# Patient Record
Sex: FEMALE | Race: BLACK OR AFRICAN AMERICAN | NOT HISPANIC OR LATINO | Employment: UNEMPLOYED | URBAN - METROPOLITAN AREA
[De-identification: names, ages, dates, MRNs, and addresses within clinical notes are randomized per-mention and may not be internally consistent; named-entity substitution may affect disease eponyms.]

---

## 2017-05-20 ENCOUNTER — HOSPITAL ENCOUNTER (EMERGENCY)
Facility: HOSPITAL | Age: 32
Discharge: HOME | End: 2017-05-20
Attending: EMERGENCY MEDICINE | Admitting: EMERGENCY MEDICINE

## 2017-05-20 VITALS
WEIGHT: 170 LBS | RESPIRATION RATE: 16 BRPM | OXYGEN SATURATION: 99 % | SYSTOLIC BLOOD PRESSURE: 140 MMHG | TEMPERATURE: 98.2 F | DIASTOLIC BLOOD PRESSURE: 73 MMHG | HEART RATE: 86 BPM

## 2017-05-20 DIAGNOSIS — N39.0 UTI (URINARY TRACT INFECTION): Primary | ICD-10-CM

## 2017-05-20 LAB
BACTERIA UR QL AUTO: ABNORMAL /HPF
BILIRUB UR QL STRIP: NEGATIVE
CLARITY UR: CLEAR
COLOR UR: YELLOW
FINE GRAN CASTS URNS QL MICRO: ABNORMAL /LPF
GLUCOSE UR STRIP-MCNC: ABNORMAL MG/DL
HCG UR QL: NEGATIVE
HGB UR QL STRIP.AUTO: ABNORMAL
KETONES UR STRIP-MCNC: NEGATIVE MG/DL
LEUKOCYTE ESTERASE UR QL STRIP: ABNORMAL
NITRITE UR QL STRIP: NEGATIVE
NON-SQ EPI CELLS URNS QL MICRO: ABNORMAL /HPF
PH UR STRIP.AUTO: 5.5 [PH] (ref 4.5–8)
PROT UR STRIP-MCNC: ABNORMAL MG/DL
RBC #/AREA URNS AUTO: ABNORMAL /HPF
SP GR UR STRIP.AUTO: >=1.03 (ref 1–1.03)
UROBILINOGEN UR QL STRIP.AUTO: 0.2 E.U./DL
WBC #/AREA URNS AUTO: ABNORMAL /HPF

## 2017-05-20 PROCEDURE — 81025 URINE PREGNANCY TEST: CPT | Performed by: PHYSICIAN ASSISTANT

## 2017-05-20 PROCEDURE — 99283 EMERGENCY DEPT VISIT LOW MDM: CPT

## 2017-05-20 PROCEDURE — 87086 URINE CULTURE/COLONY COUNT: CPT

## 2017-05-20 PROCEDURE — 81001 URINALYSIS AUTO W/SCOPE: CPT

## 2017-05-20 PROCEDURE — 87186 SC STD MICRODIL/AGAR DIL: CPT

## 2017-05-20 PROCEDURE — 81002 URINALYSIS NONAUTO W/O SCOPE: CPT | Performed by: PHYSICIAN ASSISTANT

## 2017-05-20 PROCEDURE — 87147 CULTURE TYPE IMMUNOLOGIC: CPT

## 2017-05-20 RX ORDER — NITROFURANTOIN 25; 75 MG/1; MG/1
100 CAPSULE ORAL 2 TIMES DAILY
Qty: 10 CAPSULE | Refills: 0 | Status: SHIPPED | OUTPATIENT
Start: 2017-05-20 | End: 2017-05-25

## 2017-05-23 LAB — BACTERIA UR CULT: NORMAL

## 2017-09-23 ENCOUNTER — HOSPITAL ENCOUNTER (EMERGENCY)
Facility: HOSPITAL | Age: 32
Discharge: HOME | End: 2017-09-23
Attending: EMERGENCY MEDICINE | Admitting: EMERGENCY MEDICINE

## 2017-09-23 VITALS
OXYGEN SATURATION: 100 % | DIASTOLIC BLOOD PRESSURE: 82 MMHG | TEMPERATURE: 98.1 F | RESPIRATION RATE: 15 BRPM | SYSTOLIC BLOOD PRESSURE: 166 MMHG | HEART RATE: 98 BPM

## 2017-09-23 DIAGNOSIS — E11.9 TYPE 2 DIABETES MELLITUS (HCC): ICD-10-CM

## 2017-09-23 DIAGNOSIS — N39.0 UTI (URINARY TRACT INFECTION): Primary | ICD-10-CM

## 2017-09-23 LAB
BACTERIA UR QL AUTO: ABNORMAL /HPF
BILIRUB UR QL STRIP: NEGATIVE
CLARITY UR: ABNORMAL
COLOR UR: YELLOW
EXT PREG TEST URINE: NEGATIVE
GLUCOSE SERPL-MCNC: 288 MG/DL (ref 65–140)
GLUCOSE UR STRIP-MCNC: ABNORMAL MG/DL
HGB UR QL STRIP.AUTO: ABNORMAL
KETONES UR STRIP-MCNC: NEGATIVE MG/DL
LEUKOCYTE ESTERASE UR QL STRIP: ABNORMAL
NITRITE UR QL STRIP: NEGATIVE
NON-SQ EPI CELLS URNS QL MICRO: ABNORMAL /HPF
OTHER STN SPEC: ABNORMAL
PH UR STRIP.AUTO: 5.5 [PH] (ref 4.5–8)
PROT UR STRIP-MCNC: ABNORMAL MG/DL
RBC #/AREA URNS AUTO: ABNORMAL /HPF
SP GR UR STRIP.AUTO: 1.02 (ref 1–1.03)
UROBILINOGEN UR QL STRIP.AUTO: 0.2 E.U./DL
WBC #/AREA URNS AUTO: ABNORMAL /HPF

## 2017-09-23 PROCEDURE — 81025 URINE PREGNANCY TEST: CPT | Performed by: EMERGENCY MEDICINE

## 2017-09-23 PROCEDURE — 87186 SC STD MICRODIL/AGAR DIL: CPT

## 2017-09-23 PROCEDURE — 99283 EMERGENCY DEPT VISIT LOW MDM: CPT

## 2017-09-23 PROCEDURE — 82948 REAGENT STRIP/BLOOD GLUCOSE: CPT

## 2017-09-23 PROCEDURE — 87086 URINE CULTURE/COLONY COUNT: CPT

## 2017-09-23 PROCEDURE — 87077 CULTURE AEROBIC IDENTIFY: CPT

## 2017-09-23 PROCEDURE — 81001 URINALYSIS AUTO W/SCOPE: CPT

## 2017-09-23 RX ORDER — NITROFURANTOIN 25; 75 MG/1; MG/1
100 CAPSULE ORAL 2 TIMES DAILY
Qty: 10 CAPSULE | Refills: 0 | Status: SHIPPED | OUTPATIENT
Start: 2017-09-23 | End: 2018-07-31

## 2017-09-25 LAB — BACTERIA UR CULT: NORMAL

## 2018-07-31 ENCOUNTER — HOSPITAL ENCOUNTER (EMERGENCY)
Facility: HOSPITAL | Age: 33
Discharge: HOME | End: 2018-08-01
Attending: EMERGENCY MEDICINE | Admitting: EMERGENCY MEDICINE

## 2018-07-31 DIAGNOSIS — K29.70 GASTRITIS: Primary | ICD-10-CM

## 2018-07-31 LAB
ALBUMIN SERPL BCP-MCNC: 3.4 G/DL (ref 3.5–5)
ALP SERPL-CCNC: 64 U/L (ref 46–116)
ALT SERPL W P-5'-P-CCNC: 24 U/L (ref 12–78)
ANION GAP SERPL CALCULATED.3IONS-SCNC: 6 MMOL/L (ref 4–13)
AST SERPL W P-5'-P-CCNC: 17 U/L (ref 5–45)
BASOPHILS # BLD AUTO: 0.06 THOUSANDS/ΜL (ref 0–0.1)
BASOPHILS NFR BLD AUTO: 1 % (ref 0–1)
BILIRUB SERPL-MCNC: 0.18 MG/DL (ref 0.2–1)
BUN SERPL-MCNC: 12 MG/DL (ref 5–25)
CALCIUM SERPL-MCNC: 9.1 MG/DL (ref 8.3–10.1)
CHLORIDE SERPL-SCNC: 99 MMOL/L (ref 100–108)
CO2 SERPL-SCNC: 28 MMOL/L (ref 21–32)
CREAT SERPL-MCNC: 0.89 MG/DL (ref 0.6–1.3)
EOSINOPHIL # BLD AUTO: 0.1 THOUSAND/ΜL (ref 0–0.61)
EOSINOPHIL NFR BLD AUTO: 2 % (ref 0–6)
ERYTHROCYTE [DISTWIDTH] IN BLOOD BY AUTOMATED COUNT: 13.1 % (ref 11.6–15.1)
GFR SERPL CREATININE-BSD FRML MDRD: 98 ML/MIN/1.73SQ M
GLUCOSE SERPL-MCNC: 218 MG/DL (ref 65–140)
HCT VFR BLD AUTO: 37.5 % (ref 34.8–46.1)
HGB BLD-MCNC: 12.4 G/DL (ref 11.5–15.4)
LIPASE SERPL-CCNC: 118 U/L (ref 73–393)
LYMPHOCYTES # BLD AUTO: 2.41 THOUSANDS/ΜL (ref 0.6–4.47)
LYMPHOCYTES NFR BLD AUTO: 46 % (ref 14–44)
MCH RBC QN AUTO: 28.8 PG (ref 26.8–34.3)
MCHC RBC AUTO-ENTMCNC: 33.1 G/DL (ref 31.4–37.4)
MCV RBC AUTO: 87 FL (ref 82–98)
MONOCYTES # BLD AUTO: 0.41 THOUSAND/ΜL (ref 0.17–1.22)
MONOCYTES NFR BLD AUTO: 8 % (ref 4–12)
NEUTROPHILS # BLD AUTO: 2.25 THOUSANDS/ΜL (ref 1.85–7.62)
NEUTS SEG NFR BLD AUTO: 43 % (ref 43–75)
NRBC BLD AUTO-RTO: 0 /100 WBCS
PLATELET # BLD AUTO: 464 THOUSANDS/UL (ref 149–390)
PMV BLD AUTO: 9.2 FL (ref 8.9–12.7)
POTASSIUM SERPL-SCNC: 3.8 MMOL/L (ref 3.5–5.3)
PROT SERPL-MCNC: 7.8 G/DL (ref 6.4–8.2)
RBC # BLD AUTO: 4.31 MILLION/UL (ref 3.81–5.12)
SODIUM SERPL-SCNC: 133 MMOL/L (ref 136–145)
WBC # BLD AUTO: 5.23 THOUSAND/UL (ref 4.31–10.16)

## 2018-07-31 PROCEDURE — 36415 COLL VENOUS BLD VENIPUNCTURE: CPT | Performed by: EMERGENCY MEDICINE

## 2018-07-31 PROCEDURE — 83690 ASSAY OF LIPASE: CPT | Performed by: EMERGENCY MEDICINE

## 2018-07-31 PROCEDURE — 96374 THER/PROPH/DIAG INJ IV PUSH: CPT

## 2018-07-31 PROCEDURE — 85025 COMPLETE CBC W/AUTO DIFF WBC: CPT | Performed by: EMERGENCY MEDICINE

## 2018-07-31 PROCEDURE — 80053 COMPREHEN METABOLIC PANEL: CPT | Performed by: EMERGENCY MEDICINE

## 2018-07-31 PROCEDURE — 96361 HYDRATE IV INFUSION ADD-ON: CPT

## 2018-07-31 RX ORDER — MAGNESIUM HYDROXIDE/ALUMINUM HYDROXICE/SIMETHICONE 120; 1200; 1200 MG/30ML; MG/30ML; MG/30ML
30 SUSPENSION ORAL ONCE
Status: COMPLETED | OUTPATIENT
Start: 2018-07-31 | End: 2018-07-31

## 2018-07-31 RX ORDER — ONDANSETRON 2 MG/ML
4 INJECTION INTRAMUSCULAR; INTRAVENOUS ONCE
Status: COMPLETED | OUTPATIENT
Start: 2018-07-31 | End: 2018-07-31

## 2018-07-31 RX ADMIN — ALUMINUM HYDROXIDE, MAGNESIUM HYDROXIDE, AND SIMETHICONE 30 ML: 200; 200; 20 SUSPENSION ORAL at 23:10

## 2018-07-31 RX ADMIN — LIDOCAINE HYDROCHLORIDE 10 ML: 20 SOLUTION ORAL; TOPICAL at 23:10

## 2018-07-31 RX ADMIN — ONDANSETRON 4 MG: 2 INJECTION INTRAMUSCULAR; INTRAVENOUS at 23:10

## 2018-07-31 RX ADMIN — SODIUM CHLORIDE 1000 ML: 0.9 INJECTION, SOLUTION INTRAVENOUS at 23:10

## 2018-08-01 VITALS
BODY MASS INDEX: 33.43 KG/M2 | WEIGHT: 182.76 LBS | RESPIRATION RATE: 20 BRPM | OXYGEN SATURATION: 99 % | DIASTOLIC BLOOD PRESSURE: 82 MMHG | TEMPERATURE: 98.3 F | HEART RATE: 90 BPM | SYSTOLIC BLOOD PRESSURE: 150 MMHG

## 2018-08-01 PROCEDURE — 99284 EMERGENCY DEPT VISIT MOD MDM: CPT

## 2018-08-01 RX ORDER — SUCRALFATE 1 G/1
1 TABLET ORAL
Qty: 56 TABLET | Refills: 0 | Status: SHIPPED | OUTPATIENT
Start: 2018-08-01 | End: 2018-09-14

## 2018-08-01 RX ORDER — ONDANSETRON 4 MG/1
4 TABLET, ORALLY DISINTEGRATING ORAL EVERY 8 HOURS PRN
Qty: 10 TABLET | Refills: 0 | Status: SHIPPED | OUTPATIENT
Start: 2018-08-01 | End: 2018-09-14

## 2018-08-01 NOTE — DISCHARGE INSTRUCTIONS
Gastritis   WHAT YOU NEED TO KNOW:   Gastritis is inflammation or irritation of the lining of your stomach  DISCHARGE INSTRUCTIONS:   Call 911 for any of the following:   · You develop chest pain or shortness of breath  Return to the emergency department if:   · You vomit blood  · You have black or bloody bowel movements  · You have severe stomach or back pain  Contact your healthcare provider if:   · You have a fever  · You have new or worsening symptoms, even after treatment  · You have questions or concerns about your condition or care  Medicines:   · Medicines  may be given to help treat a bacterial infection or decrease stomach acid  · Take your medicine as directed  Contact your healthcare provider if you think your medicine is not helping or if you have side effects  Tell him or her if you are allergic to any medicine  Keep a list of the medicines, vitamins, and herbs you take  Include the amounts, and when and why you take them  Bring the list or the pill bottles to follow-up visits  Carry your medicine list with you in case of an emergency  Manage or prevent gastritis:   · Do not smoke  Nicotine and other chemicals in cigarettes and cigars can make your symptoms worse and cause lung damage  Ask your healthcare provider for information if you currently smoke and need help to quit  E-cigarettes or smokeless tobacco still contain nicotine  Talk to your healthcare provider before you use these products  · Do not drink alcohol  Alcohol can prevent healing and make your gastritis worse  Talk to your healthcare provider if you need help to stop drinking  · Do not take NSAIDs or aspirin unless directed  These and similar medicines can cause irritation  If your healthcare provider says it is okay to take NSAIDs, take them with food  · Do not eat foods that cause irritation  Foods such as oranges and salsa can cause burning or pain  Eat a variety of healthy foods   Examples include fruits (not citrus), vegetables, low-fat dairy products, beans, whole-grain breads, and lean meats and fish  Try to eat small meals, and drink water with your meals  Do not eat for at least 3 hours before you go to bed  · Find ways to relax and decrease stress  Stress can increase stomach acid and make gastritis worse  Activities such as yoga, meditation, or listening to music can help you relax  Spend time with friends, or do things you enjoy  Follow up with your healthcare provider as directed: You may need ongoing tests or treatment, or referral to a gastroenterologist  Write down your questions so you remember to ask them during your visits  © 2017 [facility] Information is for End User's use only and may not be sold, redistributed or otherwise used for commercial purposes  All illustrations and images included in CareNotes® are the copyrighted property of [employer]  or Suhail Archibald  The above information is an  only  It is not intended as medical advice for individual conditions or treatments  Talk to your doctor, nurse or pharmacist before following any medical regimen to see if it is safe and effective for you

## 2018-08-01 NOTE — ED PROVIDER NOTES
History  Chief Complaint   Patient presents with    Abdominal Pain     Patient complaint of 1 5 weeks of abominal pain upper left radiating downward, nausea, and vomiting  Hx ulcers  41-year-old female with a history of diabetes, gastric ulcer presents to the emergency department with a 2 day history sharp and burning epigastric pain  She states she has been taking her usual meds without relief  She had 1 episode of vomiting where she did notice blood  Every time she tries to eat or drink anything the pain becomes worse  No fevers or chills  No prior abdominal surgeries  History provided by:  Patient   used: No    Abdominal Pain   Pain location:  Epigastric  Pain quality: burning and sharp    Pain radiates to:  Does not radiate  Pain severity:  Unable to specify  Onset quality:  Gradual  Duration:  2 days  Timing:  Constant  Progression:  Waxing and waning  Chronicity:  New  Context: eating    Context: not alcohol use, not awakening from sleep, not diet changes, not previous surgeries, not recent illness, not sick contacts, not suspicious food intake and not trauma    Relieved by:  Nothing  Worsened by:  Eating  Ineffective treatments:  Antacids  Associated symptoms: nausea and vomiting    Associated symptoms: no anorexia, no belching, no chest pain, no chills, no constipation, no cough, no diarrhea, no dysuria, no fatigue, no fever, no flatus, no hematemesis, no hematochezia, no hematuria, no shortness of breath and no sore throat    Vomiting:     Quality:  Stomach contents and bright red blood    Number of occurrences:  1    Timing:  Intermittent  Risk factors: obesity    Risk factors: no alcohol abuse, no aspirin use, has not had multiple surgeries, no NSAID use and not pregnant        Prior to Admission Medications   Prescriptions Last Dose Informant Patient Reported? Taking?   omeprazole (PriLOSEC) 20 mg delayed release capsule   Yes No   Sig: Take 20 mg by mouth daily  sitaGLIPtin (JANUVIA) 100 mg tablet   Yes No   Sig: Take 100 mg by mouth 2 (two) times a day  Facility-Administered Medications: None       Past Medical History:   Diagnosis Date    [facility]     Seasonal allergies     Ulcer        Past Surgical History:   Procedure Laterality Date    NO PAST SURGERIES         History reviewed  No pertinent family history  I have reviewed and agree with the history as documented  Social History   Substance Use Topics    Smoking status: Never Smoker    Smokeless tobacco: Never Used    Alcohol use No        Review of Systems   Constitutional: Negative  Negative for chills, diaphoresis, fatigue and fever  HENT: Negative  Negative for congestion, rhinorrhea and sore throat  Eyes: Negative  Negative for discharge, redness and itching  Respiratory: Negative  Negative for apnea, cough, chest tightness, shortness of breath and wheezing  Cardiovascular: Negative for chest pain, palpitations and leg swelling  Gastrointestinal: Positive for abdominal pain, nausea and vomiting  Negative for anorexia, blood in stool, constipation, diarrhea, flatus, hematemesis and hematochezia  Endocrine: Negative  Genitourinary: Negative  Negative for dysuria, flank pain, frequency, hematuria and urgency  Musculoskeletal: Negative  Negative for back pain  Skin: Negative  Allergic/Immunologic: Negative  Neurological: Negative  Negative for dizziness, syncope, weakness, light-headedness, numbness and headaches  Hematological: Negative  All other systems reviewed and are negative  Physical Exam  Physical Exam   Constitutional: She is oriented to person, place, and time  She appears well-developed and well-nourished  Non-toxic appearance  She does not have a sickly appearance  She does not appear ill  No distress  HENT:   Head: Normocephalic and atraumatic     Right Ear: External ear normal    Left Ear: External ear normal  Mouth/Throat: Oropharynx is clear and moist    Eyes: Conjunctivae are normal  Pupils are equal, round, and reactive to light  Right eye exhibits no discharge  Left eye exhibits no discharge  No scleral icterus  Cardiovascular: Normal rate, regular rhythm and normal heart sounds  Exam reveals no gallop and no friction rub  No murmur heard  Pulmonary/Chest: Effort normal and breath sounds normal  No respiratory distress  She has no wheezes  She has no rales  She exhibits no tenderness  Abdominal: Soft  Normal appearance and bowel sounds are normal  She exhibits no distension and no mass  There is tenderness in the epigastric area  There is no rebound and no guarding  No hernia  Neurological: She is alert and oriented to person, place, and time  She has normal reflexes  She exhibits normal muscle tone  Skin: Skin is warm and dry  No rash noted  She is not diaphoretic  No erythema  No pallor  Psychiatric: She has a normal mood and affect  Nursing note and vitals reviewed        Vital Signs  ED Triage Vitals [07/31/18 2139]   Temperature Pulse Respirations Blood Pressure SpO2   98 3 °F (36 8 °C) 97 16 157/76 99 %      Temp Source Heart Rate Source Patient Position - Orthostatic VS BP Location FiO2 (%)   Oral Monitor Sitting Right arm --      Pain Score       8           Vitals:    07/31/18 2139 07/31/18 2340   BP: 157/76 151/70   Pulse: 97 91   Patient Position - Orthostatic VS: Sitting Lying       Visual Acuity      ED Medications  Medications   sodium chloride 0 9 % bolus 1,000 mL (0 mL Intravenous Stopped 8/1/18 0027)   ondansetron (ZOFRAN) injection 4 mg (4 mg Intravenous Given 7/31/18 2310)   aluminum-magnesium hydroxide-simethicone (MYLANTA) 200-200-20 mg/5 mL oral suspension 30 mL (30 mL Oral Given 7/31/18 2310)   lidocaine viscous (XYLOCAINE) 2 % mucosal solution 10 mL (10 mL Swish & Swallow Given 7/31/18 2310)       Diagnostic Studies  Results Reviewed     Procedure Component Value Units Date/Time    Comprehensive metabolic panel [[id number]]  (Abnormal) Collected:  07/31/18 2310    Lab Status:  Final result Specimen:  Blood from Arm, Right Updated:  07/31/18 2348     Sodium 133 (L) mmol/L      Potassium 3 8 mmol/L      Chloride 99 (L) mmol/L      CO2 28 mmol/L      Anion Gap 6 mmol/L      BUN 12 mg/dL      Creatinine 0 89 mg/dL      Glucose 218 (H) mg/dL      Calcium 9 1 mg/dL      AST 17 U/L      ALT 24 U/L      Alkaline Phosphatase 64 U/L      Total Protein 7 8 g/dL      Albumin 3 4 (L) g/dL      Total Bilirubin 0 18 (L) mg/dL      eGFR 98 ml/min/1 73sq m     Narrative:         National Kidney Disease Education Program recommendations are as follows:  GFR calculation is accurate only with a steady state creatinine  Chronic Kidney disease less than 60 ml/min/1 73 sq  meters  Kidney failure less than 15 ml/min/1 73 sq  meters      Lipase [[id number]]  (Normal) Collected:  07/31/18 2310    Lab Status:  Final result Specimen:  Blood from Arm, Right Updated:  07/31/18 2348     Lipase 118 u/L     CBC and differential [[id number]]  (Abnormal) Collected:  07/31/18 2310    Lab Status:  Final result Specimen:  Blood from Arm, Right Updated:  07/31/18 2321     WBC 5 23 Thousand/uL      RBC 4 31 Million/uL      Hemoglobin 12 4 g/dL      Hematocrit 37 5 %      MCV 87 fL      MCH 28 8 pg      MCHC 33 1 g/dL      RDW 13 1 %      MPV 9 2 fL      Platelets 670 (H) Thousands/uL      nRBC 0 /100 WBCs      Neutrophils Relative 43 %      Lymphocytes Relative 46 (H) %      Monocytes Relative 8 %      Eosinophils Relative 2 %      Basophils Relative 1 %      Neutrophils Absolute 2 25 Thousands/µL      Lymphocytes Absolute 2 41 Thousands/µL      Monocytes Absolute 0 41 Thousand/µL      Eosinophils Absolute 0 10 Thousand/µL      Basophils Absolute 0 06 Thousands/µL     POCT urinalysis dipstick [[id number]]     Lab Status:  No result     POCT pregnancy, urine [[id number]]     Lab Status:  No result                  No orders to display              Procedures  Procedures       Phone Contacts  ED Phone Contact    ED Course                               MDM  Number of Diagnoses or Management Options  Diagnosis management comments: 60-year-old female with a history of gastric ulcer presents with epigastric sharp pain and burning pain over the last 2 days  She has taken her usual meds without relief  She had 1 episode of vomiting today mixed with blood  On exam she does not appear acutely ill  She has mild epigastric tenderness on exam without peritoneal signs  Will check CBC, CMP, lipase to rule out pancreatitis  This is most likely an exacerbation of her gastritis versus ulcer  Will give GI cocktail, Zofran and reassess  Amount and/or Complexity of Data Reviewed  Clinical lab tests: ordered and reviewed  Independent visualization of images, tracings, or specimens: yes      CritCare Time    Disposition  Final diagnoses:   Gastritis     Time reflects when diagnosis was documented in both MDM as applicable and the Disposition within this note     Time User Action Codes Description Comment    8/1/2018  1:01 AM Guido Rather A Add [K29 70] Gastritis       ED Disposition     ED Disposition Condition Comment    Discharge  [facility] discharge to home/self care      Condition at discharge: Good        Follow-up Information     Follow up With Specialties Details Why [address] [facility] [address] [facility] Schedule an appointment as soon as possible for a visit in 1 week  [address] [facility]  [place] [address] [phone]          Patient's Medications   Discharge Prescriptions    ONDANSETRON (ZOFRAN-ODT) 4 MG DISINTEGRATING TABLET    Take 1 tablet (4 mg total) by mouth every 8 (eight) hours as needed for nausea or vomiting       Start Date: 8/1/2018  End Date: --       Order Dose: 4 mg       Quantity: 10 tablet    Refills: 0    SUCRALFATE (CARAFATE) 1 G TABLET    Take 1 tablet (1 g total) by mouth 4 (four) times a day (before meals and at bedtime) for 14 days       Start Date: 8/1/2018  End Date: 8/15/2018       Order Dose: 1 g       Quantity: 56 tablet    Refills: 0     No discharge procedures on file      ED Provider  Electronically Signed by           Carlitos Hanna DO  08/01/18 Kathryn

## 2018-09-10 ENCOUNTER — HOSPITAL ENCOUNTER (EMERGENCY)
Facility: HOSPITAL | Age: 33
Discharge: HOME | End: 2018-09-10
Attending: EMERGENCY MEDICINE | Admitting: EMERGENCY MEDICINE
Payer: COMMERCIAL

## 2018-09-10 ENCOUNTER — APPOINTMENT (EMERGENCY)
Dept: CT IMAGING | Facility: HOSPITAL | Age: 33
End: 2018-09-10
Payer: COMMERCIAL

## 2018-09-10 VITALS
SYSTOLIC BLOOD PRESSURE: 125 MMHG | HEART RATE: 103 BPM | RESPIRATION RATE: 16 BRPM | OXYGEN SATURATION: 100 % | DIASTOLIC BLOOD PRESSURE: 57 MMHG | TEMPERATURE: 98.2 F

## 2018-09-10 DIAGNOSIS — R10.9 ABDOMINAL PAIN: Primary | ICD-10-CM

## 2018-09-10 DIAGNOSIS — R19.00 PELVIC MASS IN FEMALE: ICD-10-CM

## 2018-09-10 LAB
ALBUMIN SERPL BCP-MCNC: 3.7 G/DL (ref 3.5–5)
ALP SERPL-CCNC: 78 U/L (ref 46–116)
ALT SERPL W P-5'-P-CCNC: 18 U/L (ref 12–78)
ANION GAP SERPL CALCULATED.3IONS-SCNC: 11 MMOL/L (ref 4–13)
AST SERPL W P-5'-P-CCNC: 14 U/L (ref 5–45)
BACTERIA UR QL AUTO: ABNORMAL /HPF
BASOPHILS # BLD AUTO: 0.06 THOUSANDS/ΜL (ref 0–0.1)
BASOPHILS NFR BLD AUTO: 1 % (ref 0–1)
BILIRUB SERPL-MCNC: 0.24 MG/DL (ref 0.2–1)
BILIRUB UR QL STRIP: NEGATIVE
BUN SERPL-MCNC: 12 MG/DL (ref 5–25)
CALCIUM SERPL-MCNC: 9.1 MG/DL (ref 8.3–10.1)
CHLORIDE SERPL-SCNC: 98 MMOL/L (ref 100–108)
CLARITY UR: CLEAR
CO2 SERPL-SCNC: 24 MMOL/L (ref 21–32)
COLOR UR: YELLOW
CREAT SERPL-MCNC: 0.92 MG/DL (ref 0.6–1.3)
EOSINOPHIL # BLD AUTO: 0.13 THOUSAND/ΜL (ref 0–0.61)
EOSINOPHIL NFR BLD AUTO: 2 % (ref 0–6)
ERYTHROCYTE [DISTWIDTH] IN BLOOD BY AUTOMATED COUNT: 13.2 % (ref 11.6–15.1)
EXT PREG TEST URINE: NORMAL
GFR SERPL CREATININE-BSD FRML MDRD: 95 ML/MIN/1.73SQ M
GLUCOSE SERPL-MCNC: 313 MG/DL (ref 65–140)
GLUCOSE UR STRIP-MCNC: ABNORMAL MG/DL
HCT VFR BLD AUTO: 38 % (ref 34.8–46.1)
HGB BLD-MCNC: 12.6 G/DL (ref 11.5–15.4)
HGB UR QL STRIP.AUTO: ABNORMAL
IMM GRANULOCYTES # BLD AUTO: 0.01 THOUSAND/UL (ref 0–0.2)
IMM GRANULOCYTES NFR BLD AUTO: 0 % (ref 0–2)
KETONES UR STRIP-MCNC: NEGATIVE MG/DL
LEUKOCYTE ESTERASE UR QL STRIP: NEGATIVE
LIPASE SERPL-CCNC: 167 U/L (ref 73–393)
LYMPHOCYTES # BLD AUTO: 2.29 THOUSANDS/ΜL (ref 0.6–4.47)
LYMPHOCYTES NFR BLD AUTO: 34 % (ref 14–44)
MCH RBC QN AUTO: 28.5 PG (ref 26.8–34.3)
MCHC RBC AUTO-ENTMCNC: 33.2 G/DL (ref 31.4–37.4)
MCV RBC AUTO: 86 FL (ref 82–98)
MONOCYTES # BLD AUTO: 0.58 THOUSAND/ΜL (ref 0.17–1.22)
MONOCYTES NFR BLD AUTO: 9 % (ref 4–12)
NEUTROPHILS # BLD AUTO: 3.67 THOUSANDS/ΜL (ref 1.85–7.62)
NEUTS SEG NFR BLD AUTO: 54 % (ref 43–75)
NITRITE UR QL STRIP: NEGATIVE
NON-SQ EPI CELLS URNS QL MICRO: ABNORMAL /HPF
NRBC BLD AUTO-RTO: 0 /100 WBCS
PH UR STRIP.AUTO: 6 [PH] (ref 4.5–8)
PLATELET # BLD AUTO: 421 THOUSANDS/UL (ref 149–390)
PMV BLD AUTO: 9.1 FL (ref 8.9–12.7)
POTASSIUM SERPL-SCNC: 4 MMOL/L (ref 3.5–5.3)
PROT SERPL-MCNC: 8.2 G/DL (ref 6.4–8.2)
PROT UR STRIP-MCNC: ABNORMAL MG/DL
RBC # BLD AUTO: 4.42 MILLION/UL (ref 3.81–5.12)
RBC #/AREA URNS AUTO: ABNORMAL /HPF
SODIUM SERPL-SCNC: 133 MMOL/L (ref 136–145)
SP GR UR STRIP.AUTO: 1.02 (ref 1–1.03)
UROBILINOGEN UR QL STRIP.AUTO: 0.2 E.U./DL
WBC # BLD AUTO: 6.74 THOUSAND/UL (ref 4.31–10.16)
WBC #/AREA URNS AUTO: ABNORMAL /HPF

## 2018-09-10 PROCEDURE — 36415 COLL VENOUS BLD VENIPUNCTURE: CPT | Performed by: STUDENT IN AN ORGANIZED HEALTH CARE EDUCATION/TRAINING PROGRAM

## 2018-09-10 PROCEDURE — 96361 HYDRATE IV INFUSION ADD-ON: CPT

## 2018-09-10 PROCEDURE — 81025 URINE PREGNANCY TEST: CPT | Performed by: STUDENT IN AN ORGANIZED HEALTH CARE EDUCATION/TRAINING PROGRAM

## 2018-09-10 PROCEDURE — 85025 COMPLETE CBC W/AUTO DIFF WBC: CPT | Performed by: STUDENT IN AN ORGANIZED HEALTH CARE EDUCATION/TRAINING PROGRAM

## 2018-09-10 PROCEDURE — 83690 ASSAY OF LIPASE: CPT | Performed by: STUDENT IN AN ORGANIZED HEALTH CARE EDUCATION/TRAINING PROGRAM

## 2018-09-10 PROCEDURE — 81001 URINALYSIS AUTO W/SCOPE: CPT

## 2018-09-10 PROCEDURE — 96374 THER/PROPH/DIAG INJ IV PUSH: CPT

## 2018-09-10 PROCEDURE — 80053 COMPREHEN METABOLIC PANEL: CPT | Performed by: STUDENT IN AN ORGANIZED HEALTH CARE EDUCATION/TRAINING PROGRAM

## 2018-09-10 PROCEDURE — 74177 CT ABD & PELVIS W/CONTRAST: CPT

## 2018-09-10 PROCEDURE — 99284 EMERGENCY DEPT VISIT MOD MDM: CPT

## 2018-09-10 RX ORDER — KETOROLAC TROMETHAMINE 30 MG/ML
15 INJECTION, SOLUTION INTRAMUSCULAR; INTRAVENOUS ONCE
Status: COMPLETED | OUTPATIENT
Start: 2018-09-10 | End: 2018-09-10

## 2018-09-10 RX ADMIN — IOHEXOL 100 ML: 350 INJECTION, SOLUTION INTRAVENOUS at 06:12

## 2018-09-10 RX ADMIN — KETOROLAC TROMETHAMINE 15 MG: 30 INJECTION, SOLUTION INTRAMUSCULAR at 05:19

## 2018-09-10 RX ADMIN — SODIUM CHLORIDE 1000 ML: 0.9 INJECTION, SOLUTION INTRAVENOUS at 05:09

## 2018-09-10 NOTE — ED PROVIDER NOTES
History  Chief Complaint   Patient presents with    Abdominal Pain     pt reports that she woke up and felt mid abdominal pain and nausea  history of ulcers  This is a 32yo female with a PMHx of DM and gastric ulcers who presents to the ED this morning with sudden onset abdominal pain around 0200  Patient states that she went to bed around 0130 and was about to fall asleep when she suddenly felt a sharp, burning pain in her abdomen around the umbilicus  Patient states that the pain is 10 5/10 and does not radiate anywhere  Patient states her last BM was at 2132 last night and her LMP was 19Aug at 1632  Patient took zofran, tylenol, and pepto bismol when the pain started and denies any relief  She vomited twice since the pain started  She denies any recent or previous abdominal surgeries  She denies any dysuria, hematuria, vaginal discharge, vaginal bleeding, diarrhea, or constipation  Patient denies any tobacco, alcohol, or drugs  Prior to Admission Medications   Prescriptions Last Dose Informant Patient Reported? Taking? RaNITidine HCl (ZANTAC PO)   Yes Yes   Sig: Take by mouth   ondansetron (ZOFRAN-ODT) 4 mg disintegrating tablet   No No   Sig: Take 1 tablet (4 mg total) by mouth every 8 (eight) hours as needed for nausea or vomiting   sitaGLIPtin (JANUVIA) 100 mg tablet   Yes No   Sig: Take 100 mg by mouth 2 (two) times a day  sucralfate (CARAFATE) 1 g tablet   No No   Sig: Take 1 tablet (1 g total) by mouth 4 (four) times a day (before meals and at bedtime) for 14 days      Facility-Administered Medications: None       Past Medical History:   Diagnosis Date    Diabetes mellitus ([facility] )     Seasonal allergies     Ulcer        Past Surgical History:   Procedure Laterality Date    NO PAST SURGERIES         History reviewed  No pertinent family history  I have reviewed and agree with the history as documented      Social History   Substance Use Topics    Smoking status: Never Smoker    Smokeless tobacco: Never Used    Alcohol use No        Review of Systems   Constitutional: Negative for chills, fatigue and fever  HENT: Negative for congestion, rhinorrhea, sinus pressure and sore throat  Eyes: Negative for visual disturbance  Respiratory: Negative for cough and shortness of breath  Cardiovascular: Negative for chest pain  Gastrointestinal: Positive for abdominal pain, nausea and vomiting  Negative for constipation and diarrhea  Genitourinary: Negative for dysuria, frequency, hematuria and urgency  Musculoskeletal: Negative for arthralgias and myalgias  Skin: Negative for color change and rash  Neurological: Negative for dizziness, light-headedness and numbness  Physical Exam  ED Triage Vitals [09/10/18 0431]   Temperature Pulse Respirations Blood Pressure SpO2   98 2 °F (36 8 °C) 104 18 (!) 175/86 99 %      Temp Source Heart Rate Source Patient Position - Orthostatic VS BP Location FiO2 (%)   Oral Monitor Sitting Right arm --      Pain Score       --           Orthostatic Vital Signs  Vitals:    09/10/18 0431 09/10/18 0643   BP: (!) 175/86 125/57   Pulse: 104 103   Patient Position - Orthostatic VS: Sitting Lying       Physical Exam   Constitutional: She is oriented to person, place, and time  She appears well-developed and well-nourished  No distress  HENT:   Head: Normocephalic and atraumatic  Eyes: Conjunctivae and EOM are normal  Pupils are equal, round, and reactive to light  Right eye exhibits no discharge  Left eye exhibits no discharge  No scleral icterus  Neck: Normal range of motion  Neck supple  No JVD present  Cardiovascular: Normal rate, regular rhythm and normal heart sounds  Exam reveals no gallop and no friction rub  No murmur heard  Pulmonary/Chest: Effort normal and breath sounds normal  No stridor  No respiratory distress  She has no wheezes  She has no rales  Abdominal: Soft  Bowel sounds are normal  She exhibits no distension   There is tenderness (Mainly RUQ)  There is no guarding  Musculoskeletal: Normal range of motion  She exhibits no edema, tenderness or deformity  Neurological: She is alert and oriented to person, place, and time  No cranial nerve deficit or sensory deficit  She exhibits normal muscle tone  Skin: Skin is warm and dry  No rash noted  She is not diaphoretic  No erythema  No pallor  Psychiatric: She has a normal mood and affect  Her behavior is normal    Nursing note and vitals reviewed  ED Medications  Medications   ketorolac (TORADOL) injection 15 mg (15 mg Intravenous Given 9/10/18 0519)   sodium chloride 0 9 % bolus 1,000 mL (1,000 mL Intravenous New Bag 9/10/18 0509)   iohexol (OMNIPAQUE) 350 MG/ML injection (SINGLE-DOSE) 100 mL (100 mL Intravenous Given 9/10/18 0612)       Diagnostic Studies  Results Reviewed     Procedure Component Value Units Date/Time    Comprehensive metabolic panel [[id number]]  (Abnormal) Collected:  09/10/18 0509    Lab Status:  Final result Specimen:  Blood from Arm, Left Updated:  09/10/18 0544     Sodium 133 (L) mmol/L      Potassium 4 0 mmol/L      Chloride 98 (L) mmol/L      CO2 24 mmol/L      ANION GAP 11 mmol/L      BUN 12 mg/dL      Creatinine 0 92 mg/dL      Glucose 313 (H) mg/dL      Calcium 9 1 mg/dL      AST 14 U/L      ALT 18 U/L      Alkaline Phosphatase 78 U/L      Total Protein 8 2 g/dL      Albumin 3 7 g/dL      Total Bilirubin 0 24 mg/dL      eGFR 95 ml/min/1 73sq m     Narrative:         National Kidney Disease Education Program recommendations are as follows:  GFR calculation is accurate only with a steady state creatinine  Chronic Kidney disease less than 60 ml/min/1 73 sq  meters  Kidney failure less than 15 ml/min/1 73 sq  meters      Lipase [[id number]]  (Normal) Collected:  09/10/18 0509    Lab Status:  Final result Specimen:  Blood from Arm, Left Updated:  09/10/18 0544     Lipase 167 u/L     Urine Microscopic [[id number]]  (Abnormal) Collected:  09/10/18 0524    Lab Status:  Final result Specimen:  Urine from Urine, Clean Catch Updated:  09/10/18 0539     RBC, UA 1-2 (A) /hpf      WBC, UA 0-1 (A) /hpf      Epithelial Cells Occasional /hpf      Bacteria, UA Occasional /hpf     CBC and differential [[id number]]  (Abnormal) Collected:  09/10/18 0509    Lab Status:  Final result Specimen:  Blood from Arm, Left Updated:  09/10/18 0518     WBC 6 74 Thousand/uL      RBC 4 42 Million/uL      Hemoglobin 12 6 g/dL      Hematocrit 38 0 %      MCV 86 fL      MCH 28 5 pg      MCHC 33 2 g/dL      RDW 13 2 %      MPV 9 1 fL      Platelets 560 (H) Thousands/uL      nRBC 0 /100 WBCs      Neutrophils Relative 54 %      Immat GRANS % 0 %      Lymphocytes Relative 34 %      Monocytes Relative 9 %      Eosinophils Relative 2 %      Basophils Relative 1 %      Neutrophils Absolute 3 67 Thousands/µL      Immature Grans Absolute 0 01 Thousand/uL      Lymphocytes Absolute 2 29 Thousands/µL      Monocytes Absolute 0 58 Thousand/µL      Eosinophils Absolute 0 13 Thousand/µL      Basophils Absolute 0 06 Thousands/µL     POCT urinalysis dipstick [[id number]]  (Abnormal) Resulted:  09/10/18 0516    Lab Status:  Final result Specimen:  Urine Updated:  09/10/18 0516    POCT pregnancy, urine [[id number]]  (Normal) Resulted:  09/10/18 0516    Lab Status:  Final result Updated:  09/10/18 0516     EXT PREG TEST UR (Ref: Negative) neg    ED Urine Macroscopic [[id number]]  (Abnormal) Collected:  09/10/18 0524    Lab Status:  Final result Specimen:  Urine Updated:  09/10/18 0515     Color, UA Yellow     Clarity, UA Clear     pH, UA 6 0     Leukocytes, UA Negative     Nitrite, UA Negative     Protein,  (2+) (A) mg/dl      Glucose,  (1/2%) (A) mg/dl      Ketones, UA Negative mg/dl      Urobilinogen, UA 0 2 E U /dl      Bilirubin, UA Negative     Blood, UA Small (A)     Specific [place], UA 1 025    Narrative:       CLINITEK RESULT                 CT abdomen pelvis with contrast   Final Result by Lori Foster, DO (09/10 3236)   1  Large midline pelvic mass, new from the prior study  Findings likely represent an enlarged exophytic uterine fibroid  There is associated heterogeneity of the uterus  Uterine neoplasm not excluded  Given the size of the lesion, pelvic ultrasound    not recommended  Recommend follow-up pelvic MRI with gadolinium  2   Mild bilateral hydroureteronephrosis, secondary to mass effect from the uterine lesion  I personally discussed this study with Ele Metcalf on 9/10/2018 at 6:43 AM                Workstation performed: [id number]         MRI pelvis w wo contrast    (Results Pending)         Procedures  Procedures      Phone Consults  ED Phone Contact    ED Course                               MDM  Number of Diagnoses or Management Options  Abdominal pain:   Pelvic mass in female:   Diagnosis management comments: Bedside RUQ ultrasound unremarkable with no pericholecystic fluid, wall thickening, stones, or sonographic cornejo's sign  Patient's CBD visualized and measures approximately 2mm  Patient's CTAP revealed a large pelvic mass that appears to originate from the uterus  Likely an exophytic mass but neoplasm cannot be ruled out  MRI pelvis recommended  Called and spoke with Ob who stated that the patient can follow up as an outpatient in their clinic  Order placed for MRI pelvis and patient discharged home in good condition with instructions to make the MRI and Ob appointments  Patient also instructed to return to the ED should she develop any new or concerning symptoms  Patient understood and agreed with the plan       CritCare Time    Disposition  Final diagnoses:   Abdominal pain   Pelvic mass in female     Time reflects when diagnosis was documented in both MDM as applicable and the Disposition within this note     Time User Action Codes Description Comment    9/10/2018  7:04 AM Idris Garza Add [R10 9] Abdominal pain     9/10/2018  7:04 AM Idris Garza Add [R19 00] Pelvic mass in female       ED Disposition     ED Disposition Condition Comment    Discharge  Petty Boo A See discharge to home/self care  Condition at discharge: Good        Follow-up Information     Follow up With Specialties Details Why [address] [place] [address] Obstetrics and Gynecology Follow up  [address] [id number]  [address], [address]   [address] [place] [address]  49 [phone]) [phone] Jase  Emergency Department Emergency Medicine  If symptoms worsen [address] [place] [address]  [phone] [place] [facility], [address] [place] [address], [address]          Patient's Medications   Discharge Prescriptions    No medications on file       Outpatient Discharge Orders  MRI pelvis w wo contrast   Standing Status: Future  Standing Exp  Date: 09/10/22         ED Provider  Attending physically available and evaluated Missy Nicolas I managed the patient along with the ED Attending      Electronically Signed by         Gray Torres MD  09/10/18 5492

## 2018-09-10 NOTE — DISCHARGE INSTRUCTIONS
Abdominal Pain   WHAT YOU NEED TO KNOW:   Abdominal pain can be dull, achy, or sharp  You may have pain in one area of your abdomen, or in your entire abdomen  Your pain may be caused by a condition such as constipation, food sensitivity or poisoning, infection, or a blockage  Abdominal pain can also be from a hernia, appendicitis, or an ulcer  Liver, gallbladder, or kidney conditions can also cause abdominal pain  The cause of your abdominal pain may be unknown  DISCHARGE INSTRUCTIONS:   Return to the emergency department if:   · You have new chest pain or shortness of breath  · You have pulsing pain in your upper abdomen or lower back that suddenly becomes constant  · Your pain is in the right lower abdominal area and worsens with movement  · You have a fever over 100 4°F (38°C) or shaking chills  · You are vomiting and cannot keep food or liquids down  · Your pain does not improve or gets worse over the next 8 to 12 hours  · You see blood in your vomit or bowel movements, or they look black and tarry  · Your skin or the whites of your eyes turn yellow  · You are a woman and have a large amount of vaginal bleeding that is not your monthly period  Contact your healthcare provider if:   · You have pain in your lower back  · You are a man and have pain in your testicles  · You have pain when you urinate  · You have questions or concerns about your condition or care  Follow up with your healthcare provider within 24 hours or as directed:  Write down your questions so you remember to ask them during your visits  Medicines:   · Medicines  may be given to calm your stomach and prevent vomiting or to decrease pain  Ask how to take pain medicine safely  · Take your medicine as directed  Contact your healthcare provider if you think your medicine is not helping or if you have side effects  Tell him of her if you are allergic to any medicine   Keep a list of the medicines, vitamins, and herbs you take  Include the amounts, and when and why you take them  Bring the list or the pill bottles to follow-up visits  Carry your medicine list with you in case of an emergency  © 2017 [employer] Information is for End User's use only and may not be sold, redistributed or otherwise used for commercial purposes  All illustrations and images included in CareNotes® are the copyrighted property of A D A M [employer] Inc  or Suhail Archibald  The above information is an  only  It is not intended as medical advice for individual conditions or treatments  Talk to your doctor, nurse or pharmacist before following any medical regimen to see if it is safe and effective for you

## 2018-09-10 NOTE — ED NOTES
Patient transported to [facility] [place], [address] [place] [address]  09/10/18 7044

## 2018-09-10 NOTE — ED ATTENDING ATTESTATION
Dickson Morillo DO, saw and evaluated the patient  I have discussed the patient with the resident/non-physician practitioner and agree with the resident's/non-physician practitioner's findings, Plan of Care, and MDM as documented in the resident's/non-physician practitioner's note, except where noted  All available labs and Radiology studies were reviewed  At this point I agree with the current assessment done in the Emergency Department  I have conducted an independent evaluation of this patient a history and physical is as follows:    Patient is a 27-year-old female that presents for an abrupt onset of periumbilical pain  States that it radiates to her right upper quadrant and her left upper quadrant  She states that she had a normal day yesterday without any problems  She does still have her gallbladder  She ate fish before going to bed  She states that after the pain woke her up she had 2 episodes of bilious vomiting  Patient denies any other symptoms with this  Patient appears in no acute distress on exam   Conjunctiva are normal and nonicteric  Mucous membranes are moist  Heart rate is regular rate and rhythm  Lungs are clear to auscultation bilaterally  Abdomen is soft with tenderness to the periumbilical, right upper quadrant and left upper quadrant  No lower extremity edema  Plan is to check labs, urinalysis, CT scan, treat pain and nausea and reassess  6:44 AM  CT scan shows an abnormality coming off the uterus  Probable fibroid that is causing mass effect and some mild hydronephrosis  Will discuss with OBGYN for further evaluation  MR of the abdomen is recommended by Radiology    Critical Care Time  CritCare Time    Procedures

## 2018-09-14 ENCOUNTER — OFFICE VISIT (OUTPATIENT)
Dept: OBGYN CLINIC | Facility: CLINIC | Age: 33
End: 2018-09-14
Payer: COMMERCIAL

## 2018-09-14 VITALS
WEIGHT: 184.8 LBS | BODY MASS INDEX: 34.01 KG/M2 | HEART RATE: 103 BPM | HEIGHT: 62 IN | SYSTOLIC BLOOD PRESSURE: 130 MMHG | DIASTOLIC BLOOD PRESSURE: 89 MMHG

## 2018-09-14 DIAGNOSIS — Z01.419 ENCOUNTER FOR ANNUAL ROUTINE GYNECOLOGICAL EXAMINATION: Primary | ICD-10-CM

## 2018-09-14 DIAGNOSIS — R19.00 PELVIC MASS: ICD-10-CM

## 2018-09-14 PROCEDURE — 87624 HPV HI-RISK TYP POOLED RSLT: CPT

## 2018-09-14 PROCEDURE — S0610 ANNUAL GYNECOLOGICAL EXAMINA: HCPCS | Performed by: NURSE PRACTITIONER

## 2018-09-14 PROCEDURE — 3725F SCREEN DEPRESSION PERFORMED: CPT | Performed by: NURSE PRACTITIONER

## 2018-09-14 PROCEDURE — G0145 SCR C/V CYTO,THINLAYER,RESCR: HCPCS | Performed by: NURSE PRACTITIONER

## 2018-09-14 RX ORDER — GLIMEPIRIDE 4 MG/1
1 TABLET ORAL 2 TIMES DAILY
COMMUNITY
Start: 2013-01-16 | End: 2018-11-20 | Stop reason: SDUPTHER

## 2018-09-14 RX ORDER — RANITIDINE 300 MG/1
1 TABLET ORAL DAILY
COMMUNITY
Start: 2012-03-13 | End: 2019-02-15 | Stop reason: ALTCHOICE

## 2018-09-14 RX ORDER — MULTIVITAMIN
1 CAPSULE ORAL EVERY MORNING
COMMUNITY

## 2018-09-14 RX ORDER — IBUPROFEN 600 MG/1
600 TABLET ORAL EVERY 6 HOURS
COMMUNITY
Start: 2016-12-22 | End: 2018-11-01 | Stop reason: ALTCHOICE

## 2018-09-14 RX ORDER — CHOLECALCIFEROL (VITAMIN D3) 125 MCG
500 CAPSULE ORAL EVERY MORNING
COMMUNITY

## 2018-09-14 NOTE — PROGRESS NOTES
Annual Exam  1  Encounter for annual routine gynecological examination  Liquid-based pap, screening   2  Pelvic mass  US pelvis complete non OB       Assessment        well woman@          Plan      All questions answered  Breast self exam technique reviewed and patient encouraged to perform self-exam monthly  Contraception: condoms  Discussed healthy lifestyle modifications  Follow up in for pelvic U/S results 2 weeks  Thin prep Pap smear  Make appointment for pelvic U/S  Return for results  Call with needs or concerns  Pt verbalized understanding of all discussed  Danielle Rick is a 35 y o  [place] female who presents for annual well woman exam  Periods are regular every 28-30 days, lasting 5 days  Pt states periods are painful and heavey for 2-3 days, No intermenstrual bleeding, spotting, or discharge  Pt was seen 9/10/2010 for abdominal pain and was told she had a pelvic mass, Dx by CAT Scan  Pt was provided a slip for a pelvic U/S  Explained uterus is enlarged  Pt has 1 partner x 10 years, happy with condoms for birth control  Pt is an insulin dependent diabetic, does not plan a pregnancy at this time  Discussed risk  Pt thinks her last WNL PAP was at [facility] > 2 years ago    Current contraception: condoms  History of abnormal Pap smear: no  Family history of uterine or ovarian cancer: no  Regular self breast exam: yes  History of abnormal mammogram: N/A  Family history of breast cancer: no  History of abnormal lipids: unknown  Menstrual History:  OB History      Para Term  AB Living    1       1      SAB TAB Ectopic Multiple Live Births    1                 Menarche age: 15  Patient's last menstrual period was 2018  Period Duration (Days): 4-5 dyas  Period Pattern: Regular  Menstrual Flow: Heavy, Moderate  Menstrual Control:  Thin pad, Maxi pad  Dysmenorrhea: (!) Moderate  Dysmenorrhea Symptoms: Cramping, Throbbing, Nausea, Headache    The following portions of the patient's history were reviewed and updated as appropriate: allergies, current medications, past family history, past medical history, past social history, past surgical history and problem list     Review of Systems  Pertinent items are noted in HPI        Objective      /89 (BP Location: Right arm, Patient Position: Sitting, Cuff Size: Standard)   Pulse 103   Ht 5' 2" (1 575 m)   Wt 83 8 kg (184 lb 12 8 oz)   LMP 08/19/2018   BMI 33 80 kg/m²     General:   alert and oriented, in no acute distress, alert, appears stated age and cooperative   Heart: regular rate and rhythm, S1, S2 normal, no murmur, click, rub or gallop   Lungs: clear to auscultation bilaterally   Thyroid: Negative masses   Abdomen: soft, non-tender, without masses or organomegaly   Vulva: normal   Vagina: normal mucosa   Cervix: no bleeding following Pap, no cervical motion tenderness and no lesions   Uterus: 20 week size, NT   Adnexa: normal adnexa   Breasts: NT, negative masses, discharge or dimpling

## 2018-09-19 LAB
LAB AP GYN PRIMARY INTERPRETATION: NORMAL
Lab: NORMAL
PATH INTERP SPEC-IMP: NORMAL

## 2018-09-20 DIAGNOSIS — Z01.419 ENCOUNTER FOR ANNUAL ROUTINE GYNECOLOGICAL EXAMINATION: Primary | ICD-10-CM

## 2018-09-20 DIAGNOSIS — Z01.419 ENCOUNTER FOR ANNUAL ROUTINE GYNECOLOGICAL EXAMINATION: ICD-10-CM

## 2018-09-21 LAB
HPV HR 12 DNA CVX QL NAA+PROBE: POSITIVE
HPV16 DNA CVX QL NAA+PROBE: NEGATIVE
HPV18 DNA CVX QL NAA+PROBE: NEGATIVE

## 2018-09-24 ENCOUNTER — TELEPHONE (OUTPATIENT)
Dept: OBGYN CLINIC | Facility: CLINIC | Age: 33
End: 2018-09-24

## 2018-09-24 PROBLEM — R87.810 CERVICAL HIGH RISK HPV (HUMAN PAPILLOMAVIRUS) TEST POSITIVE: Status: ACTIVE | Noted: 2018-09-24

## 2018-10-01 ENCOUNTER — HOSPITAL ENCOUNTER (OUTPATIENT)
Dept: ULTRASOUND IMAGING | Facility: HOSPITAL | Age: 33
Discharge: HOME | End: 2018-10-01
Payer: COMMERCIAL

## 2018-10-01 DIAGNOSIS — R19.00 PELVIC MASS: ICD-10-CM

## 2018-10-01 PROCEDURE — 76856 US EXAM PELVIC COMPLETE: CPT

## 2018-10-01 PROCEDURE — 76830 TRANSVAGINAL US NON-OB: CPT

## 2018-10-09 ENCOUNTER — OFFICE VISIT (OUTPATIENT)
Dept: OBGYN CLINIC | Facility: CLINIC | Age: 33
End: 2018-10-09
Payer: COMMERCIAL

## 2018-10-09 VITALS
WEIGHT: 183.2 LBS | SYSTOLIC BLOOD PRESSURE: 130 MMHG | HEART RATE: 83 BPM | DIASTOLIC BLOOD PRESSURE: 84 MMHG | BODY MASS INDEX: 33.51 KG/M2

## 2018-10-09 DIAGNOSIS — E11.9 TYPE 2 DIABETES MELLITUS WITHOUT COMPLICATION, WITHOUT LONG-TERM CURRENT USE OF INSULIN (HCC): ICD-10-CM

## 2018-10-09 DIAGNOSIS — R19.00 PELVIC MASS: ICD-10-CM

## 2018-10-09 DIAGNOSIS — Z71.2 ENCOUNTER TO DISCUSS TEST RESULTS: Primary | ICD-10-CM

## 2018-10-09 PROCEDURE — 99213 OFFICE O/P EST LOW 20 MIN: CPT | Performed by: OBSTETRICS & GYNECOLOGY

## 2018-10-09 NOTE — PROGRESS NOTES
Assessment/Plan:      Diagnoses and all orders for this visit:    Encounter to discuss test results  - Reviewed results of US with patient and consistent with CT scan: Approximately 10cm pelvic mass, most likely subserosal fibroid  - Reviewed management options with patient and patient desires definitive surgical treatment  Reviewed risk of recurrence with myomectomy  Pt desires fertility and therefore wants fertility sparing surgery  - Pt to follow up with [address] [place] [address] regarding possible surgery  - Will check a A1C given history of T2DM on medication  Pt encouraged to follow up with PCP as pt was counseled on need for control of her DM prior to elective surgery  - Up to Date with pap smear: 18 NILM and other HR HPV positive     Pelvic mass    Type 2 diabetes mellitus without complication, without long-term current use of insulin (HCC)  -     Hemoglobin A1C; Future          Subjective:     Patient ID: Monae Beatty is a 35 y o  female  Pt is a 32yo  obese (BMI 33 51) female with hx of T2DM presenting for results of US  She had been seen in the ED on 9/10/18 for abdominal pain and a CT exam demonstrated a large pelvic mass, approximately 10cm  She then presented to GYN for follow-up on  and was sent for [address]  The US demonstrated normal ovaries, however, a 8 9 x 9 9 x 10 8cm midline pelvic mass corresponding to the CT, likely arising from the uterine fundus  "Echotexture is suggestive of a fibroid though the ultrasound appearance, as on CT, is non specific " Pt states she desires fertility, but would like to have the mass removed  She is on Januvia 100mg daily for her T2DM  She has not had her hgb A1C checked in a while  She states she is supposed to follow up with her PCP soon  She complains of abdominal fullness and notices bulging of the mass in abdomen  Review of Systems   Constitutional: Negative for chills and fever  Gastrointestinal: Positive for abdominal distention  Negative for abdominal pain, constipation, diarrhea, nausea and vomiting  Genitourinary: Negative for vaginal bleeding and vaginal discharge  Objective:     Physical Exam   Constitutional: She is oriented to person, place, and time  She appears well-developed and well-nourished  No distress  HENT:   Head: Normocephalic and atraumatic  Abdominal: Soft  Bowel sounds are normal  She exhibits distension and mass (palpable at umbilicus)  There is no tenderness  There is no rebound and no guarding  Genitourinary: There is no rash, tenderness, lesion or injury on the right labia  There is no rash, tenderness, lesion or injury on the left labia  Uterus is enlarged (20 weeks) and tender (mild tenderness to palpation)  Cervix exhibits no motion tenderness, no discharge and no friability  Right adnexum displays no mass, no tenderness and no fullness  Left adnexum displays no mass, no tenderness and no fullness  No erythema or bleeding in the vagina  Vaginal discharge found  Musculoskeletal: Normal range of motion  She exhibits no edema, tenderness or deformity  Neurological: She is alert and oriented to person, place, and time  Skin: Skin is warm and dry  No rash noted  She is not diaphoretic  No erythema  No pallor  Psychiatric: She has a normal mood and affect  Her behavior is normal  Judgment and thought content normal    Vitals reviewed          D/w Dr Sadia Pfeiffer MD  OBGYN, PGY-2  10/9/2018 12:06 PM

## 2018-10-30 ENCOUNTER — APPOINTMENT (OUTPATIENT)
Dept: LAB | Facility: HOSPITAL | Age: 33
End: 2018-10-30
Payer: COMMERCIAL

## 2018-10-30 DIAGNOSIS — E11.9 TYPE 2 DIABETES MELLITUS WITHOUT COMPLICATION, WITHOUT LONG-TERM CURRENT USE OF INSULIN (HCC): ICD-10-CM

## 2018-10-30 LAB
EST. AVERAGE GLUCOSE BLD GHB EST-MCNC: 260 MG/DL
HBA1C MFR BLD: 10.7 % (ref 4.2–6.3)

## 2018-10-30 PROCEDURE — 36415 COLL VENOUS BLD VENIPUNCTURE: CPT

## 2018-10-30 PROCEDURE — 83036 HEMOGLOBIN GLYCOSYLATED A1C: CPT

## 2018-11-01 ENCOUNTER — OFFICE VISIT (OUTPATIENT)
Dept: OBGYN CLINIC | Facility: CLINIC | Age: 33
End: 2018-11-01
Payer: COMMERCIAL

## 2018-11-01 VITALS
DIASTOLIC BLOOD PRESSURE: 88 MMHG | HEIGHT: 63 IN | WEIGHT: 181 LBS | BODY MASS INDEX: 32.07 KG/M2 | SYSTOLIC BLOOD PRESSURE: 150 MMHG

## 2018-11-01 DIAGNOSIS — N94.6 DYSMENORRHEA: ICD-10-CM

## 2018-11-01 DIAGNOSIS — N92.0 MENORRHAGIA WITH REGULAR CYCLE: Primary | ICD-10-CM

## 2018-11-01 PROCEDURE — 99214 OFFICE O/P EST MOD 30 MIN: CPT | Performed by: OBSTETRICS & GYNECOLOGY

## 2018-11-01 RX ORDER — NORGESTIMATE AND ETHINYL ESTRADIOL 0.25-0.035
1 KIT ORAL DAILY
Qty: 28 TABLET | Refills: 0 | Status: SHIPPED | OUTPATIENT
Start: 2018-11-01 | End: 2018-11-27 | Stop reason: SDUPTHER

## 2018-11-01 RX ORDER — NAPROXEN 500 MG/1
500 TABLET ORAL 2 TIMES DAILY WITH MEALS
Qty: 30 TABLET | Refills: 0 | Status: SHIPPED | OUTPATIENT
Start: 2018-11-01 | End: 2019-02-15 | Stop reason: ALTCHOICE

## 2018-11-01 RX ORDER — NAPROXEN 500 MG/1
500 TABLET ORAL 2 TIMES DAILY WITH MEALS
Qty: 30 TABLET | Refills: 0 | Status: SHIPPED | OUTPATIENT
Start: 2018-11-01 | End: 2018-11-01

## 2018-11-01 NOTE — PROGRESS NOTES
Assessment   35year old  with Symptomatic uterine fibroids  Type 2 DM  Obesity       Plan   Contraception: OCP (estrogen/progesterone)  Plan will ultimately be for definitive laparoscopic myomectomy once patient has her diabetes under better control  -patient to f/u in 3 months        Subjective   Ramirez Gee is a 35 y o  female who presents with uterine fibroids  She was recently seen in the ED with abdominal pain that she thought was an ulcer  CT scan at that time demonstrated a large 10cm fundal fibroid, for which she was referred to GYN  TVUS was ordered which confirmed a 10 cm "exophitic" fundal subserosal fibroid  Patient complains of painful, but regular periods and is interested in birth control  Periods are regular every 28-30 days, lasting 5 days  Dysmenorrhea:moderate, occurring first 1-2 days of flow  Cyclic symptoms include headache  No intermenstrual bleeding, spotting, or discharge  Patient's PMH is complicated by uncontrolled type 2 DM with a recent A1c of 10 7  She is scheduled to follow up with her family practice doctor this month to address her poor glycemic control  This was stressed as she cannot be considered a candidate for elective surgery with a Hb A1c above 8  Current contraception: coitus interruptus and condoms  History of abnormal Pap smear: last pap NSIL, but + HPV  Family history of uterine or ovarian cancer: no  Family history of breast cancer: no  History of abnormal lipids: no  Menstrual History:  OB History      Para Term  AB Living    2       1      SAB TAB Ectopic Multiple Live Births    2                   Patient's last menstrual period was 10/20/2018  The following portions of the patient's history were reviewed and updated as appropriate: allergies, current medications, past family history, past medical history, past social history, past surgical history and problem list     Review of Systems  Pertinent items are noted in HPI  Objective   /88   Ht 5' 2 5" (1 588 m)   Wt 82 1 kg (181 lb)   LMP 10/20/2018   BMI 32 58 kg/m²     General:   alert and oriented, in no acute distress   Heart: regular rate and rhythm   Lungs: normal respiratory effort

## 2018-11-02 ENCOUNTER — TELEPHONE (OUTPATIENT)
Dept: OBGYN CLINIC | Facility: CLINIC | Age: 33
End: 2018-11-02

## 2018-11-20 ENCOUNTER — OFFICE VISIT (OUTPATIENT)
Dept: FAMILY MEDICINE CLINIC | Facility: CLINIC | Age: 33
End: 2018-11-20
Payer: COMMERCIAL

## 2018-11-20 VITALS
SYSTOLIC BLOOD PRESSURE: 150 MMHG | RESPIRATION RATE: 18 BRPM | HEIGHT: 63 IN | DIASTOLIC BLOOD PRESSURE: 80 MMHG | TEMPERATURE: 96.6 F | HEART RATE: 106 BPM | OXYGEN SATURATION: 100 % | BODY MASS INDEX: 32.6 KG/M2 | WEIGHT: 184 LBS

## 2018-11-20 DIAGNOSIS — E11.9 TYPE 2 DIABETES MELLITUS WITHOUT COMPLICATION, WITHOUT LONG-TERM CURRENT USE OF INSULIN (HCC): Primary | ICD-10-CM

## 2018-11-20 DIAGNOSIS — Z23 NEED FOR INFLUENZA VACCINATION: ICD-10-CM

## 2018-11-20 PROCEDURE — 90682 RIV4 VACC RECOMBINANT DNA IM: CPT

## 2018-11-20 PROCEDURE — 1036F TOBACCO NON-USER: CPT | Performed by: PHYSICIAN ASSISTANT

## 2018-11-20 PROCEDURE — 90471 IMMUNIZATION ADMIN: CPT

## 2018-11-20 PROCEDURE — 99204 OFFICE O/P NEW MOD 45 MIN: CPT | Performed by: PHYSICIAN ASSISTANT

## 2018-11-20 PROCEDURE — 3008F BODY MASS INDEX DOCD: CPT | Performed by: PHYSICIAN ASSISTANT

## 2018-11-20 RX ORDER — GLIMEPIRIDE 4 MG/1
4 TABLET ORAL 2 TIMES DAILY
Qty: 180 TABLET | Refills: 0 | Status: SHIPPED | OUTPATIENT
Start: 2018-11-20 | End: 2019-02-26 | Stop reason: SDUPTHER

## 2018-11-20 NOTE — PROGRESS NOTES
Assessment/Plan:    Type 2 diabetes mellitus without complication, without long-term current use of insulin ([facility])  Lab Results   Component Value Date    HGBA1C 10 7 (H) 10/30/2018       No results for input(s): POCGLU in the last 72 hours  Blood Sugar Average: Last 72 hrs:   discussed with patient that the goal is to get her A1c at 7% or lower  At this time since patient has not had her diabetic medication, will refill current medications and doses and see what her A1c will be in 3 months  Did inform patient that if needed we may add a 3rd oral medication  If unable to control her diabetes with oral medications low, may have to start on daily insulin, or refer to Endocrinology  Discussed appropriate diet and exercise to better control her diabetes  Gave patient handout on carb counting and portion control  Diabetic foot exam was completed today  Get lab work completed  Patient will follow up with eye doctor for diabetic eye exam        Diagnoses and all orders for this visit:    Type 2 diabetes mellitus without complication, without long-term current use of insulin ([facility] )  -     CBC and differential; Future  -     Comprehensive metabolic panel; Future  -     Lipid panel; Future  -     Microalbumin / creatinine urine ratio; Future  -     sitaGLIPtin (JANUVIA) 100 mg tablet; Take 1 tablet (100 mg total) by mouth daily  -     glimepiride (AMARYL) 4 mg tablet; Take 1 tablet (4 mg total) by mouth 2 (two) times a day    Need for influenza vaccination  -     FIRST LINE: influenza vaccine, [id number], quadrivalent, recombinant, PF, 0 5 mL (FLUBLOK)          Subjective:      Patient ID: Elvi Reid is a 35 y o  female  77-year-old female presenting to [facility] care  Patient has been diagnosed with diabetes  States that she is on glimepiride and Januvia, but it has been about 2 or 3 months since she last had her medication  Has been trying to control her diabetes with diet    Has mainly been eating lean proteins and non starchy vegetables  States on her medication on a good day her fasting blood sugar is around 100  States that today since she has not been on medication is around 240  Patient does state that she was on metformin in the past, but was having and GI side effects with medication and it was discontinued  Has not been on any other medications  Has never used insulin  Did have an A1c completed about a month and half ago and was 10 7%  Patient has also been diagnosed with gastritis in the past   This led to gastric ulcers  States that she currently does not have any problems with ulcers  Is currently on ranitidine to help minimize symptoms  Patient denies any previous surgeries  Denies any tobacco, alcohol, drug use  Patient has no other concerns or questions today  Needs refills of her medications  The following portions of the patient's history were reviewed and updated as appropriate:   She  has a past medical history of Diabetes mellitus ([facility] ); Heart murmur; Heart palpitations; Hypertension; Seasonal allergies; Ulcer; and Urinary tract infection  She   Patient Active Problem List    Diagnosis Date Noted    Type 2 diabetes mellitus without complication, without long-term current use of insulin ([facility] ) 11/20/2018    Cervical high risk HPV (human papillomavirus) test positive 09/24/2018    Encounter for annual routine gynecological examination 09/14/2018    Pelvic mass 09/14/2018     She  has a past surgical history that includes No past surgeries  Her family history includes Cancer in her maternal aunt and paternal aunt; Diabetes in her maternal grandmother and mother; Heart disease in her family and paternal grandmother; Hypertension in her father; Ulcers in her paternal grandfather  She  reports that she has never smoked  She has never used smokeless tobacco  She reports that she does not drink alcohol or use drugs    Current Outpatient Prescriptions   Medication Sig Dispense Refill    cyanocobalamin (VITAMIN B-12) 500 mcg tablet Take 500 mcg by mouth daily      glimepiride (AMARYL) 4 mg tablet Take 1 tablet (4 mg total) by mouth 2 (two) times a day 180 tablet 0    Multiple Vitamin (MULTIVITAMIN) capsule Take 1 capsule by mouth daily      naproxen (EC NAPROSYN) 500 MG EC tablet Take 1 tablet (500 mg total) by mouth 2 (two) times a day with meals Take at the onset of premenstrual symptoms, continue for 5 days 30 tablet 0    norgestimate-ethinyl estradiol (ORTHO-CYCLEN) 0 25-35 MG-MCG per tablet Take 1 tablet by mouth daily 28 tablet 0    ranitidine (ZANTAC) 300 MG tablet Take 1 tablet by mouth daily      sitaGLIPtin (JANUVIA) 100 mg tablet Take 1 tablet (100 mg total) by mouth daily 90 tablet 0    RaNITidine HCl (ZANTAC PO) Take by mouth      sitaGLIPtin (JANUVIA) 100 mg tablet Take 100 mg by mouth 2 (two) times a day  No current facility-administered medications for this visit  She is allergic to nuts and strawberry extract       Review of Systems   Constitutional: Negative for activity change, appetite change, chills, fatigue, fever and unexpected weight change  HENT: Negative for congestion, ear pain, hearing loss, rhinorrhea and sore throat  Eyes: Negative for pain and visual disturbance  Respiratory: Negative for cough, chest tightness, shortness of breath and wheezing  Cardiovascular: Negative for chest pain, palpitations and leg swelling  Gastrointestinal: Negative for abdominal pain, blood in stool, constipation, diarrhea, nausea and vomiting  Endocrine: Negative for cold intolerance, heat intolerance, polydipsia, polyphagia and polyuria  Genitourinary: Negative for difficulty urinating, dysuria, frequency, hematuria and urgency  Musculoskeletal: Negative for arthralgias, joint swelling and myalgias  Skin: Negative for rash and wound  Neurological: Negative for dizziness, syncope, weakness, numbness and headaches  Psychiatric/Behavioral: Negative for dysphoric mood and sleep disturbance  The patient is not nervous/anxious  Objective:      /80 (BP Location: Right arm, Patient Position: Sitting, Cuff Size: Large)   Pulse (!) 106   Temp (!) 96 6 °F (35 9 °C) (Tympanic)   Resp 18   Ht 5' 2 5" (1 588 m)   Wt 83 5 kg (184 lb)   LMP 10/20/2018 (Exact Date)   SpO2 100%   Breastfeeding? No   BMI 33 12 kg/m²          Physical Exam   Constitutional: She is oriented to person, place, and time  She appears well-developed and well-nourished  No distress  HENT:   Right Ear: Hearing, tympanic membrane, external ear and ear canal normal    Left Ear: Hearing, tympanic membrane, external ear and ear canal normal    Nose: Nose normal    Mouth/Throat: Oropharynx is clear and moist and mucous membranes are normal  No oropharyngeal exudate  Eyes: Pupils are equal, round, and reactive to light  Conjunctivae, EOM and lids are normal    Neck: Normal range of motion  Neck supple  Cardiovascular: Normal rate, regular rhythm, normal heart sounds and normal pulses  Exam reveals no gallop and no friction rub  Pulses are no weak pulses  No murmur heard  Pulses:       Dorsalis pedis pulses are 2+ on the right side, and 2+ on the left side  Posterior tibial pulses are 2+ on the right side, and 2+ on the left side  Pulmonary/Chest: Effort normal and breath sounds normal  No tachypnea and no bradypnea  No respiratory distress  She has no wheezes  She has no rales  Abdominal: Soft  Normal appearance, normal aorta and bowel sounds are normal  She exhibits no distension  There is no tenderness  There is no rebound and no guarding  Musculoskeletal: Normal range of motion  She exhibits no edema  Feet:   Right Foot:   Skin Integrity: Positive for dry skin  Negative for ulcer, skin breakdown, erythema, warmth or callus  Left Foot:   Skin Integrity: Positive for dry skin   Negative for ulcer, skin breakdown, erythema, warmth or callus  Lymphadenopathy:     She has no cervical adenopathy  Neurological: She is alert and oriented to person, place, and time  She has normal reflexes  She is not disoriented  No cranial nerve deficit or sensory deficit  She exhibits normal muscle tone  Skin: Skin is warm and dry  No rash noted  She is not diaphoretic  Psychiatric: She has a normal mood and affect  Her behavior is normal  Thought content normal    Nursing note and vitals reviewed  Patient's shoes and socks removed  Right Foot/Ankle   Right Foot Inspection  Skin Exam: skin normal, skin intact and dry skin no warmth, no callus, no erythema, no maceration, no abnormal color, no pre-ulcer, no ulcer and no callus                          Toe Exam: ROM and strength within normal limitsno tenderness, erythema and  no right toe deformity  Sensory     Proprioception: intact   Monofilament testing: intact  Vascular  Capillary refills: < 3 seconds  The right DP pulse is 2+  The right PT pulse is 2+  Left Foot/Ankle  Left Foot Inspection  Skin Exam: skin normal, skin intact and dry skinno warmth, no erythema, no maceration, normal color, no pre-ulcer, no ulcer and no callus                         Toe Exam: ROM and strength within normal limitsno tenderness, no erythema and no left toe deformity                   Sensory     Proprioception: intact  Monofilament: intact  Vascular  Capillary refills: < 3 seconds  The left DP pulse is 2+  The left PT pulse is 2+  Assign Risk Category:  No deformity present; No loss of protective sensation;  No weak pulses       Risk: 0

## 2018-11-20 NOTE — ASSESSMENT & PLAN NOTE
Lab Results   Component Value Date    HGBA1C 10 7 (H) 10/30/2018       No results for input(s): POCGLU in the last 72 hours  Blood Sugar Average: Last 72 hrs:   discussed with patient that the goal is to get her A1c at 7% or lower  At this time since patient has not had her diabetic medication, will refill current medications and doses and see what her A1c will be in 3 months  Did inform patient that if needed we may add a 3rd oral medication  If unable to control her diabetes with oral medications low, may have to start on daily insulin, or refer to Endocrinology  Discussed appropriate diet and exercise to better control her diabetes  Gave patient handout on carb counting and portion control  Diabetic foot exam was completed today  Get lab work completed    Patient will follow up with eye doctor for diabetic eye exam

## 2018-11-26 ENCOUNTER — TELEPHONE (OUTPATIENT)
Dept: FAMILY MEDICINE CLINIC | Facility: CLINIC | Age: 33
End: 2018-11-26

## 2018-11-27 ENCOUNTER — TELEPHONE (OUTPATIENT)
Dept: OBGYN CLINIC | Facility: CLINIC | Age: 33
End: 2018-11-27

## 2018-11-27 DIAGNOSIS — N92.0 MENORRHAGIA WITH REGULAR CYCLE: ICD-10-CM

## 2018-11-27 DIAGNOSIS — N94.6 DYSMENORRHEA: ICD-10-CM

## 2018-11-27 RX ORDER — NORGESTIMATE AND ETHINYL ESTRADIOL 0.25-0.035
1 KIT ORAL DAILY
Qty: 28 TABLET | Refills: 12 | Status: SHIPPED | OUTPATIENT
Start: 2018-11-27 | End: 2019-02-14

## 2018-11-27 NOTE — TELEPHONE ENCOUNTER
----- Message from Bradley Carvalho RN sent at 11/26/2018  9:29 AM EST -----  Hey this patient was seen by you guys for a fibroid and you prescribed her OCP's, she only got 1 month supply   If you don't mind ordering more refills for her      Thanks  Shana

## 2018-11-27 NOTE — TELEPHONE ENCOUNTER
I called [facility] DR LILLI DURÁN and inform the PA form for Januvia was approved by [employer], also pt has checked with her pharmacy

## 2019-02-14 ENCOUNTER — OFFICE VISIT (OUTPATIENT)
Dept: OBGYN CLINIC | Facility: CLINIC | Age: 34
End: 2019-02-14

## 2019-02-14 VITALS
WEIGHT: 200.6 LBS | HEART RATE: 108 BPM | BODY MASS INDEX: 36.11 KG/M2 | DIASTOLIC BLOOD PRESSURE: 93 MMHG | SYSTOLIC BLOOD PRESSURE: 170 MMHG

## 2019-02-14 DIAGNOSIS — I10 HYPERTENSION, UNSPECIFIED TYPE: Primary | ICD-10-CM

## 2019-02-14 DIAGNOSIS — N92.0 MENORRHAGIA WITH REGULAR CYCLE: ICD-10-CM

## 2019-02-14 DIAGNOSIS — D25.2 SUBSEROUS LEIOMYOMA OF UTERUS: ICD-10-CM

## 2019-02-14 PROCEDURE — 99214 OFFICE O/P EST MOD 30 MIN: CPT | Performed by: OBSTETRICS & GYNECOLOGY

## 2019-02-14 RX ORDER — MEDROXYPROGESTERONE ACETATE 10 MG/1
10 TABLET ORAL DAILY
Qty: 30 TABLET | Refills: 6 | Status: SHIPPED | OUTPATIENT
Start: 2019-02-14 | End: 2019-02-15 | Stop reason: ALTCHOICE

## 2019-02-14 NOTE — PATIENT INSTRUCTIONS
Please give us a call when your blood sugar control is optimized for surgery  Have a great day!      Soto Pina :-)

## 2019-02-14 NOTE — PROGRESS NOTES
[address] [facility] Follow up Appt:    ID: Halie Hernandez is a 35 y o  Mayra Rivera here for follow up of symptomatic uterine fibroids  S:She reports that since starting OCPs, she has not had much improvement in menorrhagia or pelvic pressure  She reports that it has increased her appetite and she has gained some weight  She denies HA, changes in vision, CP, or SOB  Blood pressure is elevated today, which she attributes to the stressor of today being the anniversary of her grandmother's death  O:  ROS: Complete ROS including constitutional, skin, HENT, Eyes, CV, Resp, GI, , MS, Endo/heme/allergies, Neuro, and Psych is negative unless otherwise indicated above or detailed in HPI  PE: Blood pressure 170/93, pulse (!) 108, weight 91 kg (200 lb 9 6 oz), not currently breastfeeding  Gen: NAD, AOx3  Resp: CTAB, normal WOB  CV: RRR no mrg  Abd: soft, obese, NT, non distended  Ext: no edema   deferred     A&P: Halie Hernandez is a 35 y o  Mayra Rivera here for follow up of symptomatic uterine fibroids    - Given abrupt increase in BP, recommend immediate d/c of OCPs  Discussed Lupron vs  Ulipristol vs Provera  She desires Isha Villa  Called pharmacy for 5 mg ulipristol acetate and unfortunately this dose is not available at any of the surrounding pharmacies, or by their suppliers  Provera 10 mg daily sent to pharmacy instead  Patient to take continuously  - Januvia increased, patient planning to set up PCP appointment this month for f/u of her diabetes  She would like a myomectomy before the end of the year, this is her goal  Reports she will continue to work at getting her A1c below 8 0% so that we may schedule this    - RTC following PCP evaluation    Jian Sheffield MD 2/14/2019 4:35 PM

## 2019-02-15 ENCOUNTER — APPOINTMENT (EMERGENCY)
Dept: CT IMAGING | Facility: HOSPITAL | Age: 34
End: 2019-02-15
Payer: COMMERCIAL

## 2019-02-15 ENCOUNTER — HOSPITAL ENCOUNTER (EMERGENCY)
Facility: HOSPITAL | Age: 34
Discharge: HOME | End: 2019-02-15
Attending: EMERGENCY MEDICINE | Admitting: EMERGENCY MEDICINE
Payer: COMMERCIAL

## 2019-02-15 VITALS
TEMPERATURE: 98 F | OXYGEN SATURATION: 100 % | RESPIRATION RATE: 18 BRPM | BODY MASS INDEX: 36.11 KG/M2 | HEART RATE: 94 BPM | SYSTOLIC BLOOD PRESSURE: 136 MMHG | DIASTOLIC BLOOD PRESSURE: 71 MMHG | WEIGHT: 200.62 LBS

## 2019-02-15 DIAGNOSIS — R07.9 CHEST PAIN: Primary | ICD-10-CM

## 2019-02-15 LAB
ALBUMIN SERPL BCP-MCNC: 3.1 G/DL (ref 3.5–5)
ALP SERPL-CCNC: 53 U/L (ref 46–116)
ALT SERPL W P-5'-P-CCNC: 25 U/L (ref 12–78)
ANION GAP SERPL CALCULATED.3IONS-SCNC: 6 MMOL/L (ref 4–13)
AST SERPL W P-5'-P-CCNC: 18 U/L (ref 5–45)
BASOPHILS # BLD AUTO: 0.04 THOUSANDS/ΜL (ref 0–0.1)
BASOPHILS NFR BLD AUTO: 0 % (ref 0–1)
BILIRUB SERPL-MCNC: 0.11 MG/DL (ref 0.2–1)
BUN SERPL-MCNC: 12 MG/DL (ref 5–25)
CALCIUM SERPL-MCNC: 9.2 MG/DL (ref 8.3–10.1)
CHLORIDE SERPL-SCNC: 105 MMOL/L (ref 100–108)
CO2 SERPL-SCNC: 29 MMOL/L (ref 21–32)
CREAT SERPL-MCNC: 0.89 MG/DL (ref 0.6–1.3)
DEPRECATED D DIMER PPP: 591 NG/ML (FEU)
EOSINOPHIL # BLD AUTO: 0.13 THOUSAND/ΜL (ref 0–0.61)
EOSINOPHIL NFR BLD AUTO: 1 % (ref 0–6)
ERYTHROCYTE [DISTWIDTH] IN BLOOD BY AUTOMATED COUNT: 13 % (ref 11.6–15.1)
GFR SERPL CREATININE-BSD FRML MDRD: 98 ML/MIN/1.73SQ M
GLUCOSE SERPL-MCNC: 139 MG/DL (ref 65–140)
HCT VFR BLD AUTO: 34.8 % (ref 34.8–46.1)
HGB BLD-MCNC: 11.3 G/DL (ref 11.5–15.4)
IMM GRANULOCYTES # BLD AUTO: 0.03 THOUSAND/UL (ref 0–0.2)
IMM GRANULOCYTES NFR BLD AUTO: 0 % (ref 0–2)
LYMPHOCYTES # BLD AUTO: 2.48 THOUSANDS/ΜL (ref 0.6–4.47)
LYMPHOCYTES NFR BLD AUTO: 25 % (ref 14–44)
MCH RBC QN AUTO: 29.8 PG (ref 26.8–34.3)
MCHC RBC AUTO-ENTMCNC: 32.5 G/DL (ref 31.4–37.4)
MCV RBC AUTO: 92 FL (ref 82–98)
MONOCYTES # BLD AUTO: 0.72 THOUSAND/ΜL (ref 0.17–1.22)
MONOCYTES NFR BLD AUTO: 7 % (ref 4–12)
NEUTROPHILS # BLD AUTO: 6.5 THOUSANDS/ΜL (ref 1.85–7.62)
NEUTS SEG NFR BLD AUTO: 67 % (ref 43–75)
NRBC BLD AUTO-RTO: 0 /100 WBCS
PLATELET # BLD AUTO: 477 THOUSANDS/UL (ref 149–390)
PMV BLD AUTO: 8.6 FL (ref 8.9–12.7)
POTASSIUM SERPL-SCNC: 4.1 MMOL/L (ref 3.5–5.3)
PROT SERPL-MCNC: 7.8 G/DL (ref 6.4–8.2)
RBC # BLD AUTO: 3.79 MILLION/UL (ref 3.81–5.12)
SODIUM SERPL-SCNC: 140 MMOL/L (ref 136–145)
TROPONIN I SERPL-MCNC: <0.02 NG/ML
TROPONIN I SERPL-MCNC: <0.02 NG/ML
WBC # BLD AUTO: 9.9 THOUSAND/UL (ref 4.31–10.16)

## 2019-02-15 PROCEDURE — 99285 EMERGENCY DEPT VISIT HI MDM: CPT

## 2019-02-15 PROCEDURE — 85379 FIBRIN DEGRADATION QUANT: CPT | Performed by: EMERGENCY MEDICINE

## 2019-02-15 PROCEDURE — 80053 COMPREHEN METABOLIC PANEL: CPT | Performed by: EMERGENCY MEDICINE

## 2019-02-15 PROCEDURE — 71275 CT ANGIOGRAPHY CHEST: CPT

## 2019-02-15 PROCEDURE — 84484 ASSAY OF TROPONIN QUANT: CPT | Performed by: EMERGENCY MEDICINE

## 2019-02-15 PROCEDURE — 36415 COLL VENOUS BLD VENIPUNCTURE: CPT | Performed by: EMERGENCY MEDICINE

## 2019-02-15 PROCEDURE — 93005 ELECTROCARDIOGRAM TRACING: CPT

## 2019-02-15 PROCEDURE — 96361 HYDRATE IV INFUSION ADD-ON: CPT

## 2019-02-15 PROCEDURE — 85025 COMPLETE CBC W/AUTO DIFF WBC: CPT | Performed by: EMERGENCY MEDICINE

## 2019-02-15 PROCEDURE — 96360 HYDRATION IV INFUSION INIT: CPT

## 2019-02-15 RX ADMIN — IOHEXOL 85 ML: 350 INJECTION, SOLUTION INTRAVENOUS at 03:00

## 2019-02-15 RX ADMIN — SODIUM CHLORIDE 1000 ML: 0.9 INJECTION, SOLUTION INTRAVENOUS at 01:58

## 2019-02-15 NOTE — ED PROVIDER NOTES
History  Chief Complaint   Patient presents with    Chest Pain     left sided chest pain that started 4 hours PTA, SOB reported as well, pt also states when she was at the doctors earlier her BP was 190, no hx of HTN     Patient presents for chest pain and shortness of breath that started about 4 hr ago  States that she was at rest when symptoms started  Patient states that she thinks it might be related to food and drink that she ate tonight  Describes the pain as pressure in the substernal region and intermittently sharp  Patient does not have a cardiac history  Patient states that she was just on high dose estrogen and was stopped today because of high blood pressure that was persistent in her OBGYN office visit today  Patient has uterine fibroids and is being prepped for surgery because of persistent vaginal bleeding  History provided by:  Patient   used: No    Chest Pain   Pain location:  Substernal area  Pain quality: pressure and sharp    Pain radiates to:  Does not radiate  Pain radiates to the back: no    Pain severity:  Moderate  Onset quality:  Gradual  Duration:  4 hours  Timing:  Constant  Progression:  Waxing and waning  Chronicity:  New  Context: at rest    Relieved by:  None tried  Worsened by:  Nothing tried  Ineffective treatments:  None tried  Associated symptoms: no abdominal pain, no altered mental status, no anxiety, no back pain, no cough, no dizziness, no fever, no headache, no lower extremity edema, no nausea, no shortness of breath, no syncope and not vomiting    Risk factors: birth control and diabetes mellitus    Risk factors: no coronary artery disease and no prior DVT/PE        Prior to Admission Medications   Prescriptions Last Dose Informant Patient Reported? Taking?    Multiple Vitamin (MULTIVITAMIN) capsule  Self Yes Yes   Sig: Take 1 capsule by mouth daily   cyanocobalamin (VITAMIN B-12) 500 mcg tablet  Self Yes Yes   Sig: Take 500 mcg by mouth daily glimepiride (AMARYL) 4 mg tablet   No Yes   Sig: Take 1 tablet (4 mg total) by mouth 2 (two) times a day   sitaGLIPtin (JANUVIA) 100 mg tablet   Yes Yes   Sig: Take 100 mg by mouth 2 (two) times a day  Facility-Administered Medications: None       Past Medical History:   Diagnosis Date    Diabetes mellitus ([facility] [address] )     Heart murmur     Heart palpitations     Seasonal allergies     Ulcer     Urinary tract infection        Past Surgical History:   Procedure Laterality Date    NO PAST SURGERIES         Family History   Problem Relation Age of Onset    Diabetes Mother     Hypertension Father     Diabetes Maternal Grandmother     Heart disease Paternal Grandmother     Ulcers Paternal Grandfather     Heart disease Family     Cancer Maternal Aunt     Cancer Paternal Aunt      I have reviewed and agree with the history as documented  Social History     Tobacco Use    Smoking status: Never Smoker    Smokeless tobacco: Never Used   Substance Use Topics    Alcohol use: No    Drug use: No        Review of Systems   Constitutional: Negative for chills and fever  Respiratory: Positive for chest tightness  Negative for cough and shortness of breath  Cardiovascular: Positive for chest pain  Negative for leg swelling and syncope  Gastrointestinal: Negative for abdominal pain, nausea and vomiting  Musculoskeletal: Negative for back pain and neck pain  Skin: Negative for color change, pallor, rash and wound  Neurological: Negative for dizziness, light-headedness and headaches  All other systems reviewed and are negative  Physical Exam  Physical Exam   Constitutional: She is oriented to person, place, and time  She appears well-developed and well-nourished  Non-toxic appearance  She does not appear ill  No distress  HENT:   Head: Normocephalic and atraumatic  Mouth/Throat: Oropharynx is clear and moist    Eyes: Pupils are equal, round, and reactive to light   Conjunctivae are normal  Neck: Normal range of motion  Neck supple  Cardiovascular: Normal rate, regular rhythm, normal heart sounds and intact distal pulses  Exam reveals no friction rub  No murmur heard  Pulmonary/Chest: Effort normal and breath sounds normal  No respiratory distress  She has no wheezes  She has no rales  Abdominal: Soft  She exhibits no distension  There is no tenderness  There is no rebound and no guarding  Musculoskeletal: Normal range of motion  She exhibits no edema, tenderness or deformity  Neurological: She is alert and oriented to person, place, and time  Skin: Skin is warm and dry  Psychiatric: She has a normal mood and affect  Nursing note and vitals reviewed        Vital Signs  ED Triage Vitals   Temperature Pulse Respirations Blood Pressure SpO2   02/15/19 0117 02/15/19 0118 02/15/19 0118 02/15/19 0118 02/15/19 0118   98 °F (36 7 °C) 64 16 168/87 100 %      Temp src Heart Rate Source Patient Position - Orthostatic VS BP Location FiO2 (%)   -- 02/15/19 0239 02/15/19 0239 02/15/19 0239 --    Monitor Sitting Right arm       Pain Score       02/15/19 0124       7           Vitals:    02/15/19 0118 02/15/19 0239 02/15/19 0407   BP: 168/87 138/71 136/71   Pulse: 64 93 94   Patient Position - Orthostatic VS:  Sitting Lying       Visual Acuity      ED Medications  Medications   sodium chloride 0 9 % bolus 1,000 mL (0 mL Intravenous Stopped 2/15/19 0346)   iohexol (OMNIPAQUE) 350 MG/ML injection (SINGLE-DOSE) 85 mL (85 mL Intravenous Given 2/15/19 0300)       Diagnostic Studies  Results Reviewed     Procedure Component Value Units Date/Time    Troponin I [[id number]]  (Normal) Collected:  02/15/19 0406    Lab Status:  Final result Specimen:  Blood from Arm, Left Updated:  02/15/19 0432     Troponin I <0 02 ng/mL     D-Dimer [[id number]]  (Abnormal) Collected:  02/15/19 0154    Lab Status:  Final result Specimen:  Blood from Arm, Left Updated:  02/15/19 0223     D-Dimer, Quant 591 ng/ml (FEU) Troponin I [[id number]]  (Normal) Collected:  02/15/19 0154    Lab Status:  Final result Specimen:  Blood from Arm, Left Updated:  02/15/19 0220     Troponin I <0 02 ng/mL     Comprehensive metabolic panel [[id number]]  (Abnormal) Collected:  02/15/19 0154    Lab Status:  Final result Specimen:  Blood from Arm, Left Updated:  02/15/19 0217     Sodium 140 mmol/L      Potassium 4 1 mmol/L      Chloride 105 mmol/L      CO2 29 mmol/L      ANION GAP 6 mmol/L      BUN 12 mg/dL      Creatinine 0 89 mg/dL      Glucose 139 mg/dL      Calcium 9 2 mg/dL      AST 18 U/L      ALT 25 U/L      Alkaline Phosphatase 53 U/L      Total Protein 7 8 g/dL      Albumin 3 1 g/dL      Total Bilirubin 0 11 mg/dL      eGFR 98 ml/min/1 73sq m     Narrative:       National Kidney Disease Education Program recommendations are as follows:  GFR calculation is accurate only with a steady state creatinine  Chronic Kidney disease less than 60 ml/min/1 73 sq  meters  Kidney failure less than 15 ml/min/1 73 sq  meters      CBC and differential [[id number]]  (Abnormal) Collected:  02/15/19 0154    Lab Status:  Final result Specimen:  Blood from Arm, Left Updated:  02/15/19 0204     WBC 9 90 Thousand/uL      RBC 3 79 Million/uL      Hemoglobin 11 3 g/dL      Hematocrit 34 8 %      MCV 92 fL      MCH 29 8 pg      MCHC 32 5 g/dL      RDW 13 0 %      MPV 8 6 fL      Platelets 438 Thousands/uL      nRBC 0 /100 WBCs      Neutrophils Relative 67 %      Immat GRANS % 0 %      Lymphocytes Relative 25 %      Monocytes Relative 7 %      Eosinophils Relative 1 %      Basophils Relative 0 %      Neutrophils Absolute 6 50 Thousands/µL      Immature Grans Absolute 0 03 Thousand/uL      Lymphocytes Absolute 2 48 Thousands/µL      Monocytes Absolute 0 72 Thousand/µL      Eosinophils Absolute 0 13 Thousand/µL      Basophils Absolute 0 04 Thousands/µL     POCT urinalysis dipstick [[id number]]     Lab Status:  No result Specimen:  Urine                  CTA ED chest PE study Final Result by Marry Mccabe MD (02/15 8775)      No evidence of pulmonary embolus  No evidence of acute intrathoracic pathology                    Workstation performed: [id number]                    Procedures  ECG 12 Lead Documentation  Date/Time: 2/15/2019 1:27 AM  Performed by: Foreign Jensen DO  Authorized by: Foreign Jensen DO     Indications / Diagnosis:  Chest pain  Patient location:  ED  Previous ECG:     Previous ECG:  Unavailable  Interpretation:     Interpretation: normal    Rate:     ECG rate:  96    ECG rate assessment: normal    Rhythm:     Rhythm: sinus rhythm    Ectopy:     Ectopy: none    QRS:     QRS axis:  Normal    QRS intervals:  Normal  Conduction:     Conduction: normal    ST segments:     ST segments:  Normal  T waves:     T waves: normal      ECG 12 Lead Documentation  Date/Time: 2/15/2019 4:02 AM  Performed by: Foreign Jensen DO  Authorized by: Foreign Jensen DO     Indications / Diagnosis:  Chest pain, repeat  ECG reviewed by me, the ED Provider: yes    Patient location:  ED  Previous ECG:     Previous ECG:  Compared to current    Similarity:  No change  Interpretation:     Interpretation: normal    Rate:     ECG rate:  95    ECG rate assessment: normal    Rhythm:     Rhythm: sinus rhythm    Ectopy:     Ectopy: none    QRS:     QRS axis:  Normal    QRS intervals:  Normal  Conduction:     Conduction: normal    ST segments:     ST segments:  Normal  T waves:     T waves: normal             Phone Contacts  ED Phone Contact    ED Course         HEART Risk Score      Most Recent Value   History  0 Filed at: 02/15/2019 0403   ECG  0 Filed at: 02/15/2019 0403   Age  0 Filed at: 02/15/2019 0403   Risk Factors  1 Filed at: 02/15/2019 0403   Troponin  0 Filed at: 02/15/2019 0403   Heart Score Risk Calculator   History  0 Filed at: 02/15/2019 0403   ECG  0 Filed at: 02/15/2019 0403   Age  0 Filed at: 02/15/2019 0403   Risk Factors  1 Filed at: 02/15/2019 0403 Troponin  0 Filed at: 02/15/2019 0403   HEART Score  1 Filed at: 02/15/2019 0403   HEART Score  1 Filed at: 02/15/2019 0403                            [facility]  Number of Diagnoses or Management Options  Chest pain: new and requires workup  Diagnosis management comments: Patient presents for chest pain and shortness of breath that started 4 hr ago  Given the patient's history and medications I will obtain labs including a D-dimer, EKG and imaging based on D-dimer level  Patient does not want anything for pain at this time  2:46 AM  Labs noted  Will CT  BP is much improved as well  Now 138/71    3:58 AM  CT is negative  Will repeat troponin and EKG  4:34 AM  Repeat trop and EKG are normal   Will d/c home  Amount and/or Complexity of Data Reviewed  Clinical lab tests: ordered and reviewed  Tests in the radiology section of CPT®: ordered and reviewed  Tests in the medicine section of CPT®: ordered and reviewed  Review and summarize past medical records: yes  Independent visualization of images, tracings, or specimens: yes    Patient Progress  Patient progress: stable      Disposition  Final diagnoses:   Chest pain     Time reflects when diagnosis was documented in both MDM as applicable and the Disposition within this note     Time User Action Codes Description Comment    2/15/2019  3:58 AM Norman Muñoz Add [R07 9] Chest pain       ED Disposition     ED Disposition Condition Date/Time Comment    Discharge Stable Fri Feb 15, 2019  3:58 AM Juju Cui discharge to home/self care  Follow-up Information     Follow up With Specialties Details Why Contact Info    Heidi Powell PA-C Family Medicine, Physician Assistant Call today To schedule an appointment as soon as you can [address] [address] [address]  [phone]            Patient's Medications   Discharge Prescriptions    No medications on file     No discharge procedures on file      ED Provider  Electronically Signed by           Marnie Rodriguez DO  02/15/19 1469

## 2019-02-17 LAB
ATRIAL RATE: 95 BPM
ATRIAL RATE: 96 BPM
P AXIS: 59 DEGREES
P AXIS: 65 DEGREES
PR INTERVAL: 140 MS
PR INTERVAL: 142 MS
QRS AXIS: 71 DEGREES
QRS AXIS: 75 DEGREES
QRSD INTERVAL: 68 MS
QRSD INTERVAL: 70 MS
QT INTERVAL: 320 MS
QT INTERVAL: 330 MS
QTC INTERVAL: 404 MS
QTC INTERVAL: 414 MS
T WAVE AXIS: 42 DEGREES
T WAVE AXIS: 45 DEGREES
VENTRICULAR RATE: 95 BPM
VENTRICULAR RATE: 96 BPM

## 2019-02-17 PROCEDURE — 93010 ELECTROCARDIOGRAM REPORT: CPT | Performed by: INTERNAL MEDICINE

## 2019-02-26 DIAGNOSIS — E11.9 TYPE 2 DIABETES MELLITUS WITHOUT COMPLICATION, WITHOUT LONG-TERM CURRENT USE OF INSULIN (HCC): ICD-10-CM

## 2019-02-26 RX ORDER — GLIMEPIRIDE 4 MG/1
4 TABLET ORAL 2 TIMES DAILY
Qty: 60 TABLET | Refills: 0 | Status: SHIPPED | OUTPATIENT
Start: 2019-02-26 | End: 2019-03-12 | Stop reason: SDUPTHER

## 2019-02-27 DIAGNOSIS — E11.9 TYPE 2 DIABETES MELLITUS WITHOUT COMPLICATION, WITHOUT LONG-TERM CURRENT USE OF INSULIN (HCC): ICD-10-CM

## 2019-02-27 DIAGNOSIS — E11.9 TYPE 2 DIABETES MELLITUS WITHOUT COMPLICATION, WITHOUT LONG-TERM CURRENT USE OF INSULIN (HCC): Primary | ICD-10-CM

## 2019-03-12 ENCOUNTER — OFFICE VISIT (OUTPATIENT)
Dept: FAMILY MEDICINE CLINIC | Facility: CLINIC | Age: 34
End: 2019-03-12

## 2019-03-12 VITALS
WEIGHT: 198 LBS | TEMPERATURE: 96.9 F | HEART RATE: 92 BPM | OXYGEN SATURATION: 100 % | DIASTOLIC BLOOD PRESSURE: 80 MMHG | BODY MASS INDEX: 36.44 KG/M2 | HEIGHT: 62 IN | RESPIRATION RATE: 20 BRPM | SYSTOLIC BLOOD PRESSURE: 130 MMHG

## 2019-03-12 DIAGNOSIS — E11.9 TYPE 2 DIABETES MELLITUS WITHOUT COMPLICATION, WITHOUT LONG-TERM CURRENT USE OF INSULIN (HCC): Primary | ICD-10-CM

## 2019-03-12 LAB — SL AMB POCT HEMOGLOBIN AIC: 6.6 (ref ?–6.5)

## 2019-03-12 PROCEDURE — 83036 HEMOGLOBIN GLYCOSYLATED A1C: CPT | Performed by: PHYSICIAN ASSISTANT

## 2019-03-12 PROCEDURE — 99213 OFFICE O/P EST LOW 20 MIN: CPT | Performed by: PHYSICIAN ASSISTANT

## 2019-03-12 PROCEDURE — 36415 COLL VENOUS BLD VENIPUNCTURE: CPT | Performed by: PHYSICIAN ASSISTANT

## 2019-03-12 RX ORDER — GLIMEPIRIDE 4 MG/1
4 TABLET ORAL 2 TIMES DAILY
Qty: 180 TABLET | Refills: 0 | Status: SHIPPED | OUTPATIENT
Start: 2019-03-12 | End: 2019-05-23 | Stop reason: SDUPTHER

## 2019-03-12 NOTE — PROGRESS NOTES
Assessment/Plan:    Type 2 diabetes mellitus without complication, without long-term current use of insulin (HCC)  Lab Results   Component Value Date    HGBA1C 6 6 (A) 03/12/2019       No results for input(s): POCGLU in the last 72 hours  Blood Sugar Average: Last 72 hrs:   A1c has decreased from 10 7 down to 6 6%  At this time will continue with Januvia and glimepiride  Discussed the importance of diabetic diet  Also recommend increasing physical activity  Patient states that she had a diabetic eye exam completed at [facility]  She will have paperwork faxed to our office  Diabetic foot exam was completed in October  Patient needs to get lab work to check microalbumin creatinine ratio and cholesterol levels  Follow-up in 3 months  Blood pressure was normal today  Will continue monitoring blood pressure  At this time will not start on medication for hypertension  Discussed medications, beverages, foods that may increase blood pressure  Also stress and anxiety may be a contributing factor  If blood pressure does continue to go up, will start on medication  Diagnoses and all orders for this visit:    Type 2 diabetes mellitus without complication, without long-term current use of insulin ([facility])  -     Microalbumin / creatinine urine ratio; Future  -     Lipid panel; Future  -     sitaGLIPtin (JANUVIA) 100 mg tablet; Take 1 tablet (100 mg total) by mouth daily  -     glimepiride (AMARYL) 4 mg tablet; Take 1 tablet (4 mg total) by mouth 2 (two) times a day  -     POCT hemoglobin A1c          Subjective:      Patient ID: Michael Fitch is a 35 y o  female  51-year-old female presenting for routine follow-up of diabetes  Has been monitoring blood sugar at home, and states that it has been trending downward  Has been trying to eat healthier  Patient did go to the emergency room on 02/15/2019 for an episode of chest pain   Blood pressure was noted to be elevated, but went down on its own  Has not had any similar symptoms since  Diabetes   She presents for her follow-up diabetic visit  She has type 2 diabetes mellitus  No MedicAlert identification noted  The initial diagnosis of diabetes was made 5 years ago  Pertinent negatives for hypoglycemia include no confusion, dizziness, headaches, hunger, mood changes, nervousness/anxiousness, pallor, seizures, sleepiness, speech difficulty, sweats or tremors  Associated symptoms include chest pain (Was due to hypertension, but symptoms have resolved)  Pertinent negatives for diabetes include no blurred vision, no fatigue, no foot paresthesias, no foot ulcerations, no polydipsia, no polyphagia, no polyuria, no visual change, no weakness and no weight loss  Hypoglycemia complications include hospitalization ( was seen on ED 02/15/2018 for chest pain  )  Pertinent negatives for hypoglycemia complications include no blackouts, no nocturnal hypoglycemia, no required assistance and no required glucagon injection  Symptoms are stable  Pertinent negatives for diabetic complications include no CVA, heart disease, impotence, nephropathy, peripheral neuropathy, PVD or retinopathy  Risk factors for coronary artery disease include family history  Current diabetic treatment includes diet and oral agent (dual therapy)  She is compliant with treatment all of the time  She is currently taking insulin pre-breakfast and pre-dinner  Insulin injections are given by patient  Her weight is fluctuating minimally  She is following a diabetic, generally healthy, high fiber and low fat/cholesterol diet  Meal planning includes avoidance of concentrated sweets and carbohydrate counting  She has not had a previous visit with a dietitian  She participates in exercise every other day  She monitors blood glucose at home 3-4 x per day  She monitors urine at home <1 x per month  Blood glucose monitoring compliance is fair  Her home blood glucose trend is decreasing steadily   Her breakfast blood glucose is taken between 6-7 am  Her breakfast blood glucose range is generally  mg/dl  Her lunch blood glucose is taken between 12-1 pm  Her lunch blood glucose range is generally 110-130 mg/dl  Her dinner blood glucose is taken between 6-7 pm  Her dinner blood glucose range is generally 110-130 mg/dl  Her highest blood glucose is 110-130 mg/dl  Her overall blood glucose range is 110-130 mg/dl  She does not see a podiatrist Eye exam is current  The following portions of the patient's history were reviewed and updated as appropriate:   She  has a past medical history of [facility] ), Heart murmur, Heart palpitations, Seasonal allergies, Ulcer, and Urinary tract infection  She   Patient Active Problem List    Diagnosis Date Noted    Type 2 diabetes mellitus without complication, without long-term current use of insulin ([facility] ) 11/20/2018    Cervical high risk HPV (human papillomavirus) test positive 09/24/2018    Encounter for annual routine gynecological examination 09/14/2018    Pelvic mass 09/14/2018     She  has a past surgical history that includes No past surgeries  Her family history includes Cancer in her maternal aunt and paternal aunt; Diabetes in her maternal grandmother and mother; Heart disease in her family and paternal grandmother; Hypertension in her father; Ulcers in her paternal grandfather  She  reports that she has never smoked  She has never used smokeless tobacco  She reports that she does not drink alcohol or use drugs    Current Outpatient Medications   Medication Sig Dispense Refill    cyanocobalamin (VITAMIN B-12) 500 mcg tablet Take 500 mcg by mouth daily      glimepiride (AMARYL) 4 mg tablet Take 1 tablet (4 mg total) by mouth 2 (two) times a day 180 tablet 0    Multiple Vitamin (MULTIVITAMIN) capsule Take 1 capsule by mouth daily      sitaGLIPtin (JANUVIA) 100 mg tablet Take 1 tablet (100 mg total) by mouth daily 90 tablet 0     No current facility-administered medications for this visit  She is allergic to nuts and strawberry extract       Review of Systems   Constitutional: Negative for activity change, appetite change, chills, diaphoresis, fatigue, fever, unexpected weight change and weight loss  Eyes: Negative for blurred vision, pain and visual disturbance  Respiratory: Negative for chest tightness and shortness of breath  Cardiovascular: Positive for chest pain (Was due to hypertension, but symptoms have resolved)  Negative for palpitations and leg swelling  Gastrointestinal: Negative for abdominal pain, diarrhea, nausea and vomiting  Endocrine: Negative for cold intolerance, heat intolerance, polydipsia, polyphagia and polyuria  Genitourinary: Negative for frequency, impotence and urgency  Skin: Negative for pallor, rash and wound  Neurological: Negative for dizziness, tremors, seizures, syncope, speech difficulty, weakness, numbness and headaches  Psychiatric/Behavioral: Negative for confusion, dysphoric mood and sleep disturbance  The patient is not nervous/anxious  Objective:      /80   Pulse 92   Temp (!) 96 9 °F (36 1 °C) (Tympanic)   Resp 20   Ht 5' 2" (1 575 m)   Wt 89 8 kg (198 lb)   LMP 02/20/2019   SpO2 100%   BMI 36 21 kg/m²          Physical Exam   Constitutional: She is oriented to person, place, and time  She appears well-developed and well-nourished  No distress  Eyes: Pupils are equal, round, and reactive to light  Conjunctivae and EOM are normal    Neck: Normal range of motion  Neck supple  Cardiovascular: Normal rate, regular rhythm and normal heart sounds  Exam reveals no gallop and no friction rub  No murmur heard  Pulmonary/Chest: Effort normal and breath sounds normal  No stridor  No respiratory distress  She has no wheezes  She has no rales  Abdominal: Soft  Bowel sounds are normal  She exhibits no distension and no mass  There is no tenderness  There is no guarding  Musculoskeletal: Normal range of motion  She exhibits no edema  Lymphadenopathy:     She has no cervical adenopathy  Neurological: She is alert and oriented to person, place, and time  No cranial nerve deficit  Skin: She is not diaphoretic  Psychiatric: She has a normal mood and affect  Her behavior is normal  Thought content normal    Nursing note and vitals reviewed

## 2019-03-12 NOTE — ASSESSMENT & PLAN NOTE
Lab Results   Component Value Date    HGBA1C 6 6 (A) 03/12/2019       No results for input(s): POCGLU in the last 72 hours  Blood Sugar Average: Last 72 hrs:   A1c has decreased from 10 7 down to 6 6%  At this time will continue with Januvia and glimepiride  Discussed the importance of diabetic diet  Also recommend increasing physical activity  Patient states that she had a diabetic eye exam completed at [facility]  She will have paperwork faxed to our office  Diabetic foot exam was completed in October  Patient needs to get lab work to check microalbumin creatinine ratio and cholesterol levels  Follow-up in 3 months

## 2019-03-15 ENCOUNTER — TELEPHONE (OUTPATIENT)
Dept: OBGYN CLINIC | Facility: CLINIC | Age: 34
End: 2019-03-15

## 2019-03-15 DIAGNOSIS — D25.2 SUBSEROUS LEIOMYOMA OF UTERUS: Primary | ICD-10-CM

## 2019-03-15 NOTE — TELEPHONE ENCOUNTER
Returned patient call regarding recent A1c and desire to move forward to surgical management of her fibroid  No answer  Left message  Planning for MRI of pelvic for surgical planning prior to myomectomy  Reviewed A1c which is < 7%

## 2019-03-25 ENCOUNTER — TELEPHONE (OUTPATIENT)
Dept: OBGYN CLINIC | Facility: CLINIC | Age: 34
End: 2019-03-25

## 2019-03-25 NOTE — TELEPHONE ENCOUNTER
Obtained approval from [address] [place] [address] for MRI of pelvis    Approval number is [id number] Valid from 3/18-5/17

## 2019-03-27 ENCOUNTER — HOSPITAL ENCOUNTER (OUTPATIENT)
Dept: MRI IMAGING | Facility: HOSPITAL | Age: 34
Discharge: HOME | End: 2019-03-27
Attending: OBSTETRICS & GYNECOLOGY
Payer: COMMERCIAL

## 2019-03-27 DIAGNOSIS — D25.2 SUBSEROUS LEIOMYOMA OF UTERUS: ICD-10-CM

## 2019-03-27 PROCEDURE — A9585 GADOBUTROL INJECTION: HCPCS | Performed by: OBSTETRICS & GYNECOLOGY

## 2019-03-27 PROCEDURE — 72197 MRI PELVIS W/O & W/DYE: CPT

## 2019-03-27 RX ADMIN — GADOBUTROL 9 ML: 604.72 INJECTION INTRAVENOUS at 23:05

## 2019-03-28 PROBLEM — D25.2 SUBSEROUS LEIOMYOMA OF UTERUS: Status: ACTIVE | Noted: 2019-03-28

## 2019-04-04 ENCOUNTER — OFFICE VISIT (OUTPATIENT)
Dept: OBGYN CLINIC | Facility: CLINIC | Age: 34
End: 2019-04-04

## 2019-04-04 VITALS — BODY MASS INDEX: 35.96 KG/M2 | WEIGHT: 196.6 LBS | SYSTOLIC BLOOD PRESSURE: 162 MMHG | DIASTOLIC BLOOD PRESSURE: 99 MMHG

## 2019-04-04 DIAGNOSIS — D25.2 SUBSEROUS LEIOMYOMA OF UTERUS: Primary | ICD-10-CM

## 2019-04-04 DIAGNOSIS — R10.2 PELVIC PRESSURE IN FEMALE: ICD-10-CM

## 2019-04-04 PROCEDURE — PREOP: Performed by: OBSTETRICS & GYNECOLOGY

## 2019-04-04 RX ORDER — SODIUM CHLORIDE 9 MG/ML
75 INJECTION, SOLUTION INTRAVENOUS CONTINUOUS
Status: CANCELLED | OUTPATIENT
Start: 2019-04-22

## 2019-04-04 RX ORDER — MEDROXYPROGESTERONE ACETATE 10 MG/1
10 TABLET ORAL DAILY
Refills: 6 | COMMUNITY
Start: 2019-03-15 | End: 2019-05-09 | Stop reason: ALTCHOICE

## 2019-04-05 PROBLEM — N92.1 MENORRHAGIA WITH IRREGULAR CYCLE: Status: ACTIVE | Noted: 2019-04-05

## 2019-04-05 PROBLEM — D25.0 SUBMUCOUS LEIOMYOMA OF UTERUS: Status: ACTIVE | Noted: 2019-04-05

## 2019-04-19 ENCOUNTER — ANESTHESIA EVENT (OUTPATIENT)
Dept: PERIOP | Facility: HOSPITAL | Age: 34
End: 2019-04-19
Payer: COMMERCIAL

## 2019-04-22 ENCOUNTER — HOSPITAL ENCOUNTER (OUTPATIENT)
Facility: HOSPITAL | Age: 34
Setting detail: OUTPATIENT SURGERY
Discharge: HOME | End: 2019-04-23
Attending: OBSTETRICS & GYNECOLOGY | Admitting: OBSTETRICS & GYNECOLOGY
Payer: COMMERCIAL

## 2019-04-22 ENCOUNTER — ANESTHESIA (OUTPATIENT)
Dept: PERIOP | Facility: HOSPITAL | Age: 34
End: 2019-04-22
Payer: COMMERCIAL

## 2019-04-22 DIAGNOSIS — D25.0 SUBMUCOUS LEIOMYOMA OF UTERUS: ICD-10-CM

## 2019-04-22 DIAGNOSIS — Z98.890 STATUS POST MYOMECTOMY: Primary | ICD-10-CM

## 2019-04-22 DIAGNOSIS — R19.00 PELVIC MASS: ICD-10-CM

## 2019-04-22 DIAGNOSIS — N92.1 MENORRHAGIA WITH IRREGULAR CYCLE: ICD-10-CM

## 2019-04-22 LAB
ABO GROUP BLD: NORMAL
BLD GP AB SCN SERPL QL: NEGATIVE
EXT PREGNANCY TEST URINE: NEGATIVE
GLUCOSE SERPL-MCNC: 103 MG/DL (ref 65–140)
GLUCOSE SERPL-MCNC: 272 MG/DL (ref 65–140)
GLUCOSE SERPL-MCNC: 292 MG/DL (ref 65–140)
GLUCOSE SERPL-MCNC: 301 MG/DL (ref 65–140)
GLUCOSE SERPL-MCNC: 352 MG/DL (ref 65–140)
RH BLD: POSITIVE
SPECIMEN EXPIRATION DATE: NORMAL

## 2019-04-22 PROCEDURE — 86900 BLOOD TYPING SEROLOGIC ABO: CPT | Performed by: OBSTETRICS & GYNECOLOGY

## 2019-04-22 PROCEDURE — 58545 LAPAROSCOPIC MYOMECTOMY: CPT | Performed by: OBSTETRICS & GYNECOLOGY

## 2019-04-22 PROCEDURE — 81025 URINE PREGNANCY TEST: CPT | Performed by: ANESTHESIOLOGY

## 2019-04-22 PROCEDURE — 88305 TISSUE EXAM BY PATHOLOGIST: CPT | Performed by: PATHOLOGY

## 2019-04-22 PROCEDURE — 82948 REAGENT STRIP/BLOOD GLUCOSE: CPT

## 2019-04-22 PROCEDURE — 86901 BLOOD TYPING SEROLOGIC RH(D): CPT | Performed by: OBSTETRICS & GYNECOLOGY

## 2019-04-22 PROCEDURE — 86850 RBC ANTIBODY SCREEN: CPT | Performed by: OBSTETRICS & GYNECOLOGY

## 2019-04-22 RX ORDER — GLYCOPYRROLATE 0.2 MG/ML
INJECTION INTRAMUSCULAR; INTRAVENOUS AS NEEDED
Status: DISCONTINUED | OUTPATIENT
Start: 2019-04-22 | End: 2019-04-22 | Stop reason: SURG

## 2019-04-22 RX ORDER — HYDROMORPHONE HYDROCHLORIDE 2 MG/ML
INJECTION, SOLUTION INTRAMUSCULAR; INTRAVENOUS; SUBCUTANEOUS AS NEEDED
Status: DISCONTINUED | OUTPATIENT
Start: 2019-04-22 | End: 2019-04-22 | Stop reason: SURG

## 2019-04-22 RX ORDER — KETOROLAC TROMETHAMINE 30 MG/ML
30 INJECTION, SOLUTION INTRAMUSCULAR; INTRAVENOUS EVERY 6 HOURS SCHEDULED
Status: DISCONTINUED | OUTPATIENT
Start: 2019-04-22 | End: 2019-04-23 | Stop reason: HOSPADM

## 2019-04-22 RX ORDER — HYDROMORPHONE HCL/PF 1 MG/ML
0.5 SYRINGE (ML) INJECTION
Status: DISCONTINUED | OUTPATIENT
Start: 2019-04-22 | End: 2019-04-22 | Stop reason: HOSPADM

## 2019-04-22 RX ORDER — MIDAZOLAM HYDROCHLORIDE 1 MG/ML
INJECTION INTRAMUSCULAR; INTRAVENOUS AS NEEDED
Status: DISCONTINUED | OUTPATIENT
Start: 2019-04-22 | End: 2019-04-22 | Stop reason: SURG

## 2019-04-22 RX ORDER — ACETAMINOPHEN 325 MG/1
975 TABLET ORAL EVERY 8 HOURS SCHEDULED
Status: DISCONTINUED | OUTPATIENT
Start: 2019-04-22 | End: 2019-04-23 | Stop reason: HOSPADM

## 2019-04-22 RX ORDER — ROCURONIUM BROMIDE 10 MG/ML
INJECTION, SOLUTION INTRAVENOUS AS NEEDED
Status: DISCONTINUED | OUTPATIENT
Start: 2019-04-22 | End: 2019-04-22 | Stop reason: SURG

## 2019-04-22 RX ORDER — IBUPROFEN 600 MG/1
600 TABLET ORAL EVERY 6 HOURS SCHEDULED
Status: DISCONTINUED | OUTPATIENT
Start: 2019-04-22 | End: 2019-04-22

## 2019-04-22 RX ORDER — FENTANYL CITRATE 50 UG/ML
25 INJECTION, SOLUTION INTRAMUSCULAR; INTRAVENOUS
Status: COMPLETED | OUTPATIENT
Start: 2019-04-22 | End: 2019-04-22

## 2019-04-22 RX ORDER — ONDANSETRON 2 MG/ML
INJECTION INTRAMUSCULAR; INTRAVENOUS AS NEEDED
Status: DISCONTINUED | OUTPATIENT
Start: 2019-04-22 | End: 2019-04-22 | Stop reason: SURG

## 2019-04-22 RX ORDER — ACETAMINOPHEN 325 MG/1
650 TABLET ORAL EVERY 6 HOURS
Status: DISCONTINUED | OUTPATIENT
Start: 2019-04-22 | End: 2019-04-22

## 2019-04-22 RX ORDER — OXYCODONE HYDROCHLORIDE 5 MG/1
5 TABLET ORAL EVERY 4 HOURS PRN
Status: DISCONTINUED | OUTPATIENT
Start: 2019-04-22 | End: 2019-04-23 | Stop reason: HOSPADM

## 2019-04-22 RX ORDER — SODIUM CHLORIDE 9 MG/ML
125 INJECTION, SOLUTION INTRAVENOUS CONTINUOUS
Status: DISCONTINUED | OUTPATIENT
Start: 2019-04-22 | End: 2019-04-22

## 2019-04-22 RX ORDER — METOCLOPRAMIDE HYDROCHLORIDE 5 MG/ML
10 INJECTION INTRAMUSCULAR; INTRAVENOUS EVERY 6 HOURS PRN
Status: DISCONTINUED | OUTPATIENT
Start: 2019-04-22 | End: 2019-04-23 | Stop reason: HOSPADM

## 2019-04-22 RX ORDER — CEFAZOLIN SODIUM 2 G/50ML
2000 SOLUTION INTRAVENOUS EVERY 8 HOURS
Status: DISCONTINUED | OUTPATIENT
Start: 2019-04-22 | End: 2019-04-22

## 2019-04-22 RX ORDER — SODIUM CHLORIDE 9 MG/ML
75 INJECTION, SOLUTION INTRAVENOUS CONTINUOUS
Status: DISCONTINUED | OUTPATIENT
Start: 2019-04-22 | End: 2019-04-23 | Stop reason: HOSPADM

## 2019-04-22 RX ORDER — KETOROLAC TROMETHAMINE 30 MG/ML
INJECTION, SOLUTION INTRAMUSCULAR; INTRAVENOUS AS NEEDED
Status: DISCONTINUED | OUTPATIENT
Start: 2019-04-22 | End: 2019-04-22 | Stop reason: SURG

## 2019-04-22 RX ORDER — KETOROLAC TROMETHAMINE 10 MG/1
10 TABLET, FILM COATED ORAL EVERY 6 HOURS PRN
Qty: 20 TABLET | Refills: 0 | Status: SHIPPED | OUTPATIENT
Start: 2019-04-22 | End: 2020-01-16 | Stop reason: ALTCHOICE

## 2019-04-22 RX ORDER — ONDANSETRON 2 MG/ML
4 INJECTION INTRAMUSCULAR; INTRAVENOUS EVERY 6 HOURS PRN
Status: DISCONTINUED | OUTPATIENT
Start: 2019-04-22 | End: 2019-04-22 | Stop reason: HOSPADM

## 2019-04-22 RX ORDER — SODIUM CHLORIDE 9 MG/ML
75 INJECTION, SOLUTION INTRAVENOUS CONTINUOUS
Status: DISCONTINUED | OUTPATIENT
Start: 2019-04-22 | End: 2019-04-22

## 2019-04-22 RX ORDER — PROPOFOL 10 MG/ML
INJECTION, EMULSION INTRAVENOUS AS NEEDED
Status: DISCONTINUED | OUTPATIENT
Start: 2019-04-22 | End: 2019-04-22 | Stop reason: SURG

## 2019-04-22 RX ORDER — BUPIVACAINE HYDROCHLORIDE AND EPINEPHRINE 2.5; 5 MG/ML; UG/ML
INJECTION, SOLUTION EPIDURAL; INFILTRATION; INTRACAUDAL; PERINEURAL AS NEEDED
Status: DISCONTINUED | OUTPATIENT
Start: 2019-04-22 | End: 2019-04-22 | Stop reason: HOSPADM

## 2019-04-22 RX ORDER — LABETALOL 20 MG/4 ML (5 MG/ML) INTRAVENOUS SYRINGE
AS NEEDED
Status: DISCONTINUED | OUTPATIENT
Start: 2019-04-22 | End: 2019-04-22 | Stop reason: SURG

## 2019-04-22 RX ORDER — PANTOPRAZOLE SODIUM 40 MG/1
40 TABLET, DELAYED RELEASE ORAL
Status: DISCONTINUED | OUTPATIENT
Start: 2019-04-23 | End: 2019-04-23 | Stop reason: HOSPADM

## 2019-04-22 RX ORDER — NEOSTIGMINE METHYLSULFATE 1 MG/ML
INJECTION INTRAVENOUS AS NEEDED
Status: DISCONTINUED | OUTPATIENT
Start: 2019-04-22 | End: 2019-04-22 | Stop reason: SURG

## 2019-04-22 RX ORDER — FENTANYL CITRATE 50 UG/ML
INJECTION, SOLUTION INTRAMUSCULAR; INTRAVENOUS AS NEEDED
Status: DISCONTINUED | OUTPATIENT
Start: 2019-04-22 | End: 2019-04-22 | Stop reason: SURG

## 2019-04-22 RX ORDER — ONDANSETRON 2 MG/ML
4 INJECTION INTRAMUSCULAR; INTRAVENOUS EVERY 6 HOURS PRN
Status: DISCONTINUED | OUTPATIENT
Start: 2019-04-22 | End: 2019-04-23 | Stop reason: HOSPADM

## 2019-04-22 RX ORDER — DOCUSATE SODIUM 100 MG/1
100 CAPSULE, LIQUID FILLED ORAL 2 TIMES DAILY
Status: DISCONTINUED | OUTPATIENT
Start: 2019-04-22 | End: 2019-04-23 | Stop reason: HOSPADM

## 2019-04-22 RX ORDER — ACETAMINOPHEN 500 MG
500 TABLET ORAL EVERY 6 HOURS PRN
Qty: 30 TABLET | Refills: 0 | Status: SHIPPED | OUTPATIENT
Start: 2019-04-22 | End: 2020-01-16 | Stop reason: ALTCHOICE

## 2019-04-22 RX ADMIN — INSULIN HUMAN 4 UNITS: 100 INJECTION, SOLUTION PARENTERAL at 15:31

## 2019-04-22 RX ADMIN — MIDAZOLAM 2 MG: 1 INJECTION INTRAMUSCULAR; INTRAVENOUS at 08:45

## 2019-04-22 RX ADMIN — FENTANYL CITRATE 50 MCG: 50 INJECTION, SOLUTION INTRAMUSCULAR; INTRAVENOUS at 12:27

## 2019-04-22 RX ADMIN — HYDROMORPHONE HYDROCHLORIDE 0.5 MG: 1 INJECTION, SOLUTION INTRAMUSCULAR; INTRAVENOUS; SUBCUTANEOUS at 15:05

## 2019-04-22 RX ADMIN — ROCURONIUM BROMIDE 5 MG: 10 INJECTION, SOLUTION INTRAVENOUS at 11:07

## 2019-04-22 RX ADMIN — NEOSTIGMINE METHYLSULFATE 3 MG: 1 INJECTION, SOLUTION INTRAVENOUS at 13:47

## 2019-04-22 RX ADMIN — FENTANYL CITRATE 25 MCG: 50 INJECTION, SOLUTION INTRAMUSCULAR; INTRAVENOUS at 14:18

## 2019-04-22 RX ADMIN — METOCLOPRAMIDE 10 MG: 5 INJECTION, SOLUTION INTRAMUSCULAR; INTRAVENOUS at 21:25

## 2019-04-22 RX ADMIN — ROCURONIUM BROMIDE 50 MG: 10 INJECTION, SOLUTION INTRAVENOUS at 09:00

## 2019-04-22 RX ADMIN — LABETALOL 20 MG/4 ML (5 MG/ML) INTRAVENOUS SYRINGE 5 MG: at 12:00

## 2019-04-22 RX ADMIN — FENTANYL CITRATE 25 MCG: 50 INJECTION, SOLUTION INTRAMUSCULAR; INTRAVENOUS at 14:54

## 2019-04-22 RX ADMIN — CEFAZOLIN SODIUM 2000 MG: 2 SOLUTION INTRAVENOUS at 08:45

## 2019-04-22 RX ADMIN — DOCUSATE SODIUM 100 MG: 100 CAPSULE, LIQUID FILLED ORAL at 17:42

## 2019-04-22 RX ADMIN — FENTANYL CITRATE 25 MCG: 50 INJECTION, SOLUTION INTRAMUSCULAR; INTRAVENOUS at 14:28

## 2019-04-22 RX ADMIN — ACETAMINOPHEN 975 MG: 325 TABLET, FILM COATED ORAL at 22:19

## 2019-04-22 RX ADMIN — PROPOFOL 200 MG: 10 INJECTION, EMULSION INTRAVENOUS at 09:00

## 2019-04-22 RX ADMIN — FENTANYL CITRATE 50 MCG: 50 INJECTION, SOLUTION INTRAMUSCULAR; INTRAVENOUS at 12:05

## 2019-04-22 RX ADMIN — LABETALOL 20 MG/4 ML (5 MG/ML) INTRAVENOUS SYRINGE 2.5 MG: at 10:32

## 2019-04-22 RX ADMIN — SODIUM CHLORIDE 125 ML/HR: 0.9 INJECTION, SOLUTION INTRAVENOUS at 16:26

## 2019-04-22 RX ADMIN — LABETALOL 20 MG/4 ML (5 MG/ML) INTRAVENOUS SYRINGE 5 MG: at 10:58

## 2019-04-22 RX ADMIN — OXYCODONE HYDROCHLORIDE 5 MG: 5 TABLET ORAL at 22:19

## 2019-04-22 RX ADMIN — FENTANYL CITRATE 100 MCG: 50 INJECTION, SOLUTION INTRAMUSCULAR; INTRAVENOUS at 09:00

## 2019-04-22 RX ADMIN — FENTANYL CITRATE 100 MCG: 50 INJECTION, SOLUTION INTRAMUSCULAR; INTRAVENOUS at 10:09

## 2019-04-22 RX ADMIN — INSULIN HUMAN 4 UNITS: 100 INJECTION, SOLUTION PARENTERAL at 16:05

## 2019-04-22 RX ADMIN — LABETALOL 20 MG/4 ML (5 MG/ML) INTRAVENOUS SYRINGE 2.5 MG: at 10:48

## 2019-04-22 RX ADMIN — ONDANSETRON 4 MG: 2 INJECTION INTRAMUSCULAR; INTRAVENOUS at 11:09

## 2019-04-22 RX ADMIN — INSULIN LISPRO 4 UNITS: 100 INJECTION, SOLUTION INTRAVENOUS; SUBCUTANEOUS at 20:51

## 2019-04-22 RX ADMIN — KETOROLAC TROMETHAMINE 30 MG: 30 INJECTION, SOLUTION INTRAMUSCULAR at 18:37

## 2019-04-22 RX ADMIN — KETOROLAC TROMETHAMINE 30 MG: 30 INJECTION, SOLUTION INTRAMUSCULAR at 13:40

## 2019-04-22 RX ADMIN — SODIUM CHLORIDE: 0.9 INJECTION, SOLUTION INTRAVENOUS at 14:03

## 2019-04-22 RX ADMIN — SODIUM CHLORIDE 125 ML/HR: 0.9 INJECTION, SOLUTION INTRAVENOUS at 07:57

## 2019-04-22 RX ADMIN — HYDROMORPHONE HYDROCHLORIDE 0.5 MG: 2 INJECTION, SOLUTION INTRAMUSCULAR; INTRAVENOUS; SUBCUTANEOUS at 09:36

## 2019-04-22 RX ADMIN — KETOROLAC TROMETHAMINE 30 MG: 30 INJECTION, SOLUTION INTRAMUSCULAR at 23:29

## 2019-04-22 RX ADMIN — CEFAZOLIN SODIUM 1000 MG: 2 SOLUTION INTRAVENOUS at 12:40

## 2019-04-22 RX ADMIN — SODIUM CHLORIDE: 0.9 INJECTION, SOLUTION INTRAVENOUS at 11:16

## 2019-04-22 RX ADMIN — FENTANYL CITRATE 25 MCG: 50 INJECTION, SOLUTION INTRAMUSCULAR; INTRAVENOUS at 14:44

## 2019-04-22 RX ADMIN — GLYCOPYRROLATE 0.4 MG: 0.2 INJECTION INTRAMUSCULAR; INTRAVENOUS at 13:47

## 2019-04-22 RX ADMIN — ONDANSETRON 4 MG: 2 INJECTION INTRAMUSCULAR; INTRAVENOUS at 19:01

## 2019-04-22 RX ADMIN — SODIUM CHLORIDE: 0.9 INJECTION, SOLUTION INTRAVENOUS at 09:18

## 2019-04-22 RX ADMIN — ROCURONIUM BROMIDE 10 MG: 10 INJECTION, SOLUTION INTRAVENOUS at 10:31

## 2019-04-22 RX ADMIN — LABETALOL 20 MG/4 ML (5 MG/ML) INTRAVENOUS SYRINGE 2.5 MG: at 10:35

## 2019-04-22 RX ADMIN — HYDROMORPHONE HYDROCHLORIDE 0.5 MG: 2 INJECTION, SOLUTION INTRAMUSCULAR; INTRAVENOUS; SUBCUTANEOUS at 10:14

## 2019-04-23 VITALS
TEMPERATURE: 97.9 F | SYSTOLIC BLOOD PRESSURE: 132 MMHG | HEART RATE: 99 BPM | OXYGEN SATURATION: 98 % | DIASTOLIC BLOOD PRESSURE: 69 MMHG | RESPIRATION RATE: 18 BRPM | HEIGHT: 62 IN | BODY MASS INDEX: 36.1 KG/M2 | WEIGHT: 196.2 LBS

## 2019-04-23 LAB
GLUCOSE SERPL-MCNC: 191 MG/DL (ref 65–140)
GLUCOSE SERPL-MCNC: 287 MG/DL (ref 65–140)

## 2019-04-23 PROCEDURE — 82948 REAGENT STRIP/BLOOD GLUCOSE: CPT

## 2019-04-23 RX ORDER — METOCLOPRAMIDE 10 MG/1
10 TABLET ORAL 4 TIMES DAILY PRN
Qty: 20 TABLET | Refills: 0 | Status: SHIPPED | OUTPATIENT
Start: 2019-04-23 | End: 2020-01-16 | Stop reason: ALTCHOICE

## 2019-04-23 RX ADMIN — ACETAMINOPHEN 975 MG: 325 TABLET, FILM COATED ORAL at 05:50

## 2019-04-23 RX ADMIN — PANTOPRAZOLE SODIUM 40 MG: 40 TABLET, DELAYED RELEASE ORAL at 05:50

## 2019-04-23 RX ADMIN — INSULIN LISPRO 2 UNITS: 100 INJECTION, SOLUTION INTRAVENOUS; SUBCUTANEOUS at 09:01

## 2019-04-23 RX ADMIN — KETOROLAC TROMETHAMINE 30 MG: 30 INJECTION, SOLUTION INTRAMUSCULAR at 05:49

## 2019-04-23 RX ADMIN — DOCUSATE SODIUM 100 MG: 100 CAPSULE, LIQUID FILLED ORAL at 09:01

## 2019-05-09 ENCOUNTER — OFFICE VISIT (OUTPATIENT)
Dept: OBGYN CLINIC | Facility: CLINIC | Age: 34
End: 2019-05-09

## 2019-05-09 VITALS
SYSTOLIC BLOOD PRESSURE: 152 MMHG | WEIGHT: 192.2 LBS | HEART RATE: 89 BPM | DIASTOLIC BLOOD PRESSURE: 83 MMHG | BODY MASS INDEX: 35.15 KG/M2

## 2019-05-09 DIAGNOSIS — D25.2 SUBSEROUS LEIOMYOMA OF UTERUS: ICD-10-CM

## 2019-05-09 DIAGNOSIS — I10 ESSENTIAL HYPERTENSION: ICD-10-CM

## 2019-05-09 DIAGNOSIS — N93.9 ABNORMAL UTERINE BLEEDING (AUB): ICD-10-CM

## 2019-05-09 DIAGNOSIS — Z09 POSTOPERATIVE EXAMINATION: Primary | ICD-10-CM

## 2019-05-09 PROCEDURE — 99024 POSTOP FOLLOW-UP VISIT: CPT | Performed by: OBSTETRICS & GYNECOLOGY

## 2019-05-23 ENCOUNTER — OFFICE VISIT (OUTPATIENT)
Dept: FAMILY MEDICINE CLINIC | Facility: CLINIC | Age: 34
End: 2019-05-23

## 2019-05-23 VITALS
DIASTOLIC BLOOD PRESSURE: 90 MMHG | WEIGHT: 198 LBS | OXYGEN SATURATION: 98 % | HEIGHT: 62 IN | HEART RATE: 99 BPM | BODY MASS INDEX: 36.44 KG/M2 | TEMPERATURE: 98 F | RESPIRATION RATE: 18 BRPM | SYSTOLIC BLOOD PRESSURE: 170 MMHG

## 2019-05-23 DIAGNOSIS — I10 ESSENTIAL HYPERTENSION: ICD-10-CM

## 2019-05-23 DIAGNOSIS — E66.01 CLASS 2 SEVERE OBESITY WITH SERIOUS COMORBIDITY AND BODY MASS INDEX (BMI) OF 36.0 TO 36.9 IN ADULT, UNSPECIFIED OBESITY TYPE (HCC): ICD-10-CM

## 2019-05-23 DIAGNOSIS — E11.9 TYPE 2 DIABETES MELLITUS WITHOUT COMPLICATION, WITHOUT LONG-TERM CURRENT USE OF INSULIN (HCC): Primary | ICD-10-CM

## 2019-05-23 PROBLEM — E66.812 CLASS 2 SEVERE OBESITY WITH SERIOUS COMORBIDITY AND BODY MASS INDEX (BMI) OF 36.0 TO 36.9 IN ADULT (HCC): Status: ACTIVE | Noted: 2019-05-23

## 2019-05-23 LAB — SL AMB POCT HEMOGLOBIN AIC: 6.8 (ref ?–6.5)

## 2019-05-23 PROCEDURE — 99214 OFFICE O/P EST MOD 30 MIN: CPT | Performed by: PHYSICIAN ASSISTANT

## 2019-05-23 PROCEDURE — 3008F BODY MASS INDEX DOCD: CPT | Performed by: PHYSICIAN ASSISTANT

## 2019-05-23 PROCEDURE — 83036 HEMOGLOBIN GLYCOSYLATED A1C: CPT | Performed by: PHYSICIAN ASSISTANT

## 2019-05-23 RX ORDER — GLIMEPIRIDE 4 MG/1
4 TABLET ORAL 2 TIMES DAILY
Qty: 180 TABLET | Refills: 1 | Status: SHIPPED | OUTPATIENT
Start: 2019-05-23 | End: 2019-06-18 | Stop reason: SDUPTHER

## 2019-05-23 RX ORDER — AMLODIPINE BESYLATE 10 MG/1
10 TABLET ORAL DAILY
Qty: 90 TABLET | Refills: 1 | Status: SHIPPED | OUTPATIENT
Start: 2019-05-23 | End: 2019-06-18 | Stop reason: SDUPTHER

## 2019-05-29 DIAGNOSIS — E11.9 TYPE 2 DIABETES MELLITUS WITHOUT COMPLICATION, WITHOUT LONG-TERM CURRENT USE OF INSULIN (HCC): ICD-10-CM

## 2019-05-29 RX ORDER — GLIMEPIRIDE 4 MG/1
4 TABLET ORAL 2 TIMES DAILY
Qty: 180 TABLET | Refills: 0 | Status: CANCELLED | OUTPATIENT
Start: 2019-05-29

## 2019-06-18 DIAGNOSIS — I10 ESSENTIAL HYPERTENSION: ICD-10-CM

## 2019-06-18 DIAGNOSIS — E11.9 TYPE 2 DIABETES MELLITUS WITHOUT COMPLICATION, WITHOUT LONG-TERM CURRENT USE OF INSULIN (HCC): ICD-10-CM

## 2019-06-18 RX ORDER — AMLODIPINE BESYLATE 10 MG/1
10 TABLET ORAL DAILY
Qty: 90 TABLET | Refills: 0 | Status: SHIPPED | OUTPATIENT
Start: 2019-06-18 | End: 2019-08-26

## 2019-06-18 RX ORDER — GLIMEPIRIDE 4 MG/1
4 TABLET ORAL 2 TIMES DAILY
Qty: 180 TABLET | Refills: 0 | Status: SHIPPED | OUTPATIENT
Start: 2019-06-18 | End: 2019-08-26 | Stop reason: SDUPTHER

## 2019-06-30 ENCOUNTER — OFFICE VISIT (OUTPATIENT)
Dept: URGENT CARE | Age: 34
End: 2019-06-30
Payer: COMMERCIAL

## 2019-06-30 VITALS
SYSTOLIC BLOOD PRESSURE: 142 MMHG | OXYGEN SATURATION: 97 % | TEMPERATURE: 98.1 F | HEART RATE: 100 BPM | RESPIRATION RATE: 18 BRPM | DIASTOLIC BLOOD PRESSURE: 76 MMHG

## 2019-06-30 DIAGNOSIS — G89.18 POST-OP PAIN: Primary | ICD-10-CM

## 2019-06-30 PROCEDURE — 99203 OFFICE O/P NEW LOW 30 MIN: CPT | Performed by: PHYSICIAN ASSISTANT

## 2019-06-30 PROCEDURE — 99283 EMERGENCY DEPT VISIT LOW MDM: CPT | Performed by: PHYSICIAN ASSISTANT

## 2019-06-30 PROCEDURE — G0382 LEV 3 HOSP TYPE B ED VISIT: HCPCS | Performed by: PHYSICIAN ASSISTANT

## 2019-07-11 ENCOUNTER — OFFICE VISIT (OUTPATIENT)
Dept: OBGYN CLINIC | Facility: CLINIC | Age: 34
End: 2019-07-11

## 2019-07-11 VITALS
BODY MASS INDEX: 36.4 KG/M2 | SYSTOLIC BLOOD PRESSURE: 133 MMHG | WEIGHT: 199 LBS | DIASTOLIC BLOOD PRESSURE: 82 MMHG | HEART RATE: 112 BPM

## 2019-07-11 DIAGNOSIS — Z30.430 ENCOUNTER FOR IUD INSERTION: Primary | ICD-10-CM

## 2019-07-11 DIAGNOSIS — R10.9 ABDOMINAL CRAMPING: ICD-10-CM

## 2019-07-11 DIAGNOSIS — N88.2 STENOTIC CERVICAL OS: ICD-10-CM

## 2019-07-11 PROCEDURE — 58300 INSERT INTRAUTERINE DEVICE: CPT | Performed by: OBSTETRICS & GYNECOLOGY

## 2019-07-11 PROCEDURE — 99213 OFFICE O/P EST LOW 20 MIN: CPT | Performed by: OBSTETRICS & GYNECOLOGY

## 2019-07-11 RX ORDER — MISOPROSTOL 200 UG/1
200 TABLET ORAL ONCE
Qty: 1 TABLET | Refills: 0 | Status: SHIPPED | OUTPATIENT
Start: 2019-07-11 | End: 2020-07-22

## 2019-07-11 RX ORDER — IBUPROFEN 600 MG/1
600 TABLET ORAL EVERY 6 HOURS PRN
Qty: 30 TABLET | Refills: 0 | Status: SHIPPED | OUTPATIENT
Start: 2019-07-11

## 2019-07-11 NOTE — PROGRESS NOTES
Iud insertions  Date/Time: 2019 4:40 PM  Performed by: Chilo Carter MD  Authorized by: Chilo Carter MD     Consent:     Consent obtained:  Verbal and written    Consent given by:  Patient    Procedure risks and benefits discussed: yes      Patient questions answered: yes      Patient agrees, verbalizes understanding, and wants to proceed: yes    Procedure:     Pelvic exam performed: yes      Negative urine pregnancy test: yes      Cervix cleaned and prepped: yes      Speculum placed in vagina: yes      Tenaculum applied to cervix: yes      Uterus sounded: yes      Uterus sound depth (cm):  9    IUD inserted with no complications: no (Procedure aborted due to cervical stenosis and pt discomfort)      IUD type:  Mirena  Comments:      Pt is a 32yo  with T2DM, HTN, and obesity s/p Laparoscopic myomectomy on 19 was previously counseled at her Post-op visit for contraception options  She ultimately decided on Mirena IUD  Procedure was aborted due to cervical stenosis and patient intolerance  Pt still desires IUD, so discussed cervical ripening before insertion with cytotec as well as smaller IUD, such as [place] or Cherre Hernandez  Pt agreeable  Given prescription for cytotec  She was advised to take tablet night before insertion  Will schedule appointment once device is ordered and arrives to office  Dr Daniela Mcnally was present and participated in procedure       Gisselle Munoz MD  [facility], PGY-3  2019 4:47 PM

## 2019-08-26 ENCOUNTER — OFFICE VISIT (OUTPATIENT)
Dept: FAMILY MEDICINE CLINIC | Facility: CLINIC | Age: 34
End: 2019-08-26

## 2019-08-26 ENCOUNTER — TRANSCRIBE ORDERS (OUTPATIENT)
Dept: LAB | Facility: CLINIC | Age: 34
End: 2019-08-26

## 2019-08-26 ENCOUNTER — APPOINTMENT (OUTPATIENT)
Dept: LAB | Facility: CLINIC | Age: 34
End: 2019-08-26
Payer: COMMERCIAL

## 2019-08-26 VITALS
HEART RATE: 110 BPM | RESPIRATION RATE: 18 BRPM | OXYGEN SATURATION: 99 % | WEIGHT: 201 LBS | DIASTOLIC BLOOD PRESSURE: 78 MMHG | TEMPERATURE: 97.3 F | BODY MASS INDEX: 36.76 KG/M2 | SYSTOLIC BLOOD PRESSURE: 132 MMHG

## 2019-08-26 DIAGNOSIS — E11.9 TYPE 2 DIABETES MELLITUS WITHOUT COMPLICATION, WITHOUT LONG-TERM CURRENT USE OF INSULIN (HCC): Primary | ICD-10-CM

## 2019-08-26 DIAGNOSIS — E66.01 CLASS 2 SEVERE OBESITY WITH SERIOUS COMORBIDITY AND BODY MASS INDEX (BMI) OF 36.0 TO 36.9 IN ADULT, UNSPECIFIED OBESITY TYPE (HCC): ICD-10-CM

## 2019-08-26 DIAGNOSIS — I10 ESSENTIAL HYPERTENSION: ICD-10-CM

## 2019-08-26 DIAGNOSIS — E11.9 TYPE 2 DIABETES MELLITUS WITHOUT COMPLICATION, WITHOUT LONG-TERM CURRENT USE OF INSULIN (HCC): ICD-10-CM

## 2019-08-26 LAB
ALBUMIN SERPL BCP-MCNC: 3.4 G/DL (ref 3.5–5)
ALP SERPL-CCNC: 74 U/L (ref 46–116)
ALT SERPL W P-5'-P-CCNC: 33 U/L (ref 12–78)
ANION GAP SERPL CALCULATED.3IONS-SCNC: 4 MMOL/L (ref 4–13)
AST SERPL W P-5'-P-CCNC: 20 U/L (ref 5–45)
BASOPHILS # BLD AUTO: 0.07 THOUSANDS/ΜL (ref 0–0.1)
BASOPHILS NFR BLD AUTO: 1 % (ref 0–1)
BILIRUB SERPL-MCNC: 0.2 MG/DL (ref 0.2–1)
BUN SERPL-MCNC: 9 MG/DL (ref 5–25)
CALCIUM SERPL-MCNC: 9.1 MG/DL (ref 8.3–10.1)
CHLORIDE SERPL-SCNC: 104 MMOL/L (ref 100–108)
CHOLEST SERPL-MCNC: 168 MG/DL (ref 50–200)
CO2 SERPL-SCNC: 28 MMOL/L (ref 21–32)
CREAT SERPL-MCNC: 0.96 MG/DL (ref 0.6–1.3)
CREAT UR-MCNC: 212 MG/DL
EOSINOPHIL # BLD AUTO: 0.16 THOUSAND/ΜL (ref 0–0.61)
EOSINOPHIL NFR BLD AUTO: 2 % (ref 0–6)
ERYTHROCYTE [DISTWIDTH] IN BLOOD BY AUTOMATED COUNT: 13.7 % (ref 11.6–15.1)
GFR SERPL CREATININE-BSD FRML MDRD: 89 ML/MIN/1.73SQ M
GLUCOSE P FAST SERPL-MCNC: 112 MG/DL (ref 65–99)
HCT VFR BLD AUTO: 36.1 % (ref 34.8–46.1)
HDLC SERPL-MCNC: 45 MG/DL (ref 40–60)
HGB BLD-MCNC: 11.4 G/DL (ref 11.5–15.4)
IMM GRANULOCYTES # BLD AUTO: 0.02 THOUSAND/UL (ref 0–0.2)
IMM GRANULOCYTES NFR BLD AUTO: 0 % (ref 0–2)
LDLC SERPL CALC-MCNC: 106 MG/DL (ref 0–100)
LYMPHOCYTES # BLD AUTO: 1.65 THOUSANDS/ΜL (ref 0.6–4.47)
LYMPHOCYTES NFR BLD AUTO: 24 % (ref 14–44)
MCH RBC QN AUTO: 28 PG (ref 26.8–34.3)
MCHC RBC AUTO-ENTMCNC: 31.6 G/DL (ref 31.4–37.4)
MCV RBC AUTO: 89 FL (ref 82–98)
MICROALBUMIN UR-MCNC: 601 MG/L (ref 0–20)
MICROALBUMIN/CREAT 24H UR: 283 MG/G CREATININE (ref 0–30)
MONOCYTES # BLD AUTO: 0.52 THOUSAND/ΜL (ref 0.17–1.22)
MONOCYTES NFR BLD AUTO: 7 % (ref 4–12)
NEUTROPHILS # BLD AUTO: 4.61 THOUSANDS/ΜL (ref 1.85–7.62)
NEUTS SEG NFR BLD AUTO: 66 % (ref 43–75)
NONHDLC SERPL-MCNC: 123 MG/DL
NRBC BLD AUTO-RTO: 0 /100 WBCS
PLATELET # BLD AUTO: 598 THOUSANDS/UL (ref 149–390)
PMV BLD AUTO: 9.1 FL (ref 8.9–12.7)
POTASSIUM SERPL-SCNC: 3.8 MMOL/L (ref 3.5–5.3)
PROT SERPL-MCNC: 8 G/DL (ref 6.4–8.2)
RBC # BLD AUTO: 4.07 MILLION/UL (ref 3.81–5.12)
SL AMB POCT HEMOGLOBIN AIC: 7 (ref ?–6.5)
SODIUM SERPL-SCNC: 136 MMOL/L (ref 136–145)
TRIGL SERPL-MCNC: 84 MG/DL
WBC # BLD AUTO: 7.03 THOUSAND/UL (ref 4.31–10.16)

## 2019-08-26 PROCEDURE — 3075F SYST BP GE 130 - 139MM HG: CPT | Performed by: PHYSICIAN ASSISTANT

## 2019-08-26 PROCEDURE — 85025 COMPLETE CBC W/AUTO DIFF WBC: CPT

## 2019-08-26 PROCEDURE — 1036F TOBACCO NON-USER: CPT | Performed by: PHYSICIAN ASSISTANT

## 2019-08-26 PROCEDURE — 82043 UR ALBUMIN QUANTITATIVE: CPT

## 2019-08-26 PROCEDURE — 82570 ASSAY OF URINE CREATININE: CPT

## 2019-08-26 PROCEDURE — 3051F HG A1C>EQUAL 7.0%<8.0%: CPT | Performed by: PHYSICIAN ASSISTANT

## 2019-08-26 PROCEDURE — 80053 COMPREHEN METABOLIC PANEL: CPT

## 2019-08-26 PROCEDURE — 99213 OFFICE O/P EST LOW 20 MIN: CPT | Performed by: PHYSICIAN ASSISTANT

## 2019-08-26 PROCEDURE — 3060F POS MICROALBUMINURIA REV: CPT | Performed by: PHYSICIAN ASSISTANT

## 2019-08-26 PROCEDURE — 83036 HEMOGLOBIN GLYCOSYLATED A1C: CPT | Performed by: PHYSICIAN ASSISTANT

## 2019-08-26 PROCEDURE — 80061 LIPID PANEL: CPT

## 2019-08-26 PROCEDURE — 3078F DIAST BP <80 MM HG: CPT | Performed by: PHYSICIAN ASSISTANT

## 2019-08-26 PROCEDURE — 36415 COLL VENOUS BLD VENIPUNCTURE: CPT

## 2019-08-26 RX ORDER — GLIMEPIRIDE 4 MG/1
4 TABLET ORAL 2 TIMES DAILY
Qty: 180 TABLET | Refills: 0 | Status: SHIPPED | OUTPATIENT
Start: 2019-08-26 | End: 2020-01-16 | Stop reason: SDUPTHER

## 2019-08-26 RX ORDER — LISINOPRIL 20 MG/1
20 TABLET ORAL DAILY
Qty: 90 TABLET | Refills: 1 | Status: SHIPPED | OUTPATIENT
Start: 2019-08-26 | End: 2019-09-10 | Stop reason: SDUPTHER

## 2019-08-26 NOTE — ASSESSMENT & PLAN NOTE
Blood pressure is stable today  However due to concerns of side effects with amlodipine, will switch to lisinopril  Did discussed side effects medication with patient  Will recheck blood pressure at next visit see if medication needs to be adjusted

## 2019-08-26 NOTE — PROGRESS NOTES
Assessment/Plan:    Type 2 diabetes mellitus without complication, without long-term current use of insulin ([facility])  Lab Results   Component Value Date    HGBA1C 7 0 (A) 08/26/2019       No results for input(s): POCGLU in the last 72 hours  Blood Sugar Average: Last 72 hrs:   A1c has been slowly at trending upwards  At this time will not make any adjustments to patient's medication  Discussed the importance of a diabetic diet  Encourage patient continue with exercise  Did inform patient about carb counting, and making sure she eats the appropriate foods  Diet should consist of lean proteins, and non starchy vegetables  Also did inform patient to monitor calorie intake  Per patient request, will refer to dietitian to help assist with appropriate diet, and also weight loss  Essential hypertension  Blood pressure is stable today  However due to concerns of side effects with amlodipine, will switch to lisinopril  Did discussed side effects medication with patient  Will recheck blood pressure at next visit see if medication needs to be adjusted  Diagnoses and all orders for this visit:    Type 2 diabetes mellitus without complication, without long-term current use of insulin ([facility])  -     POCT hemoglobin A1c  -     Ambulatory referral to Nutrition Services; Future  -     glimepiride (AMARYL) 4 mg tablet; Take 1 tablet (4 mg total) by mouth 2 (two) times a day  -     sitaGLIPtin (JANUVIA) 100 mg tablet; Take 1 tablet (100 mg total) by mouth every morning    Class 2 severe obesity with serious comorbidity and body mass index (BMI) of 36 0 to 36 9 in adult, unspecified obesity type ([facility] )  -     Ambulatory referral to Nutrition Services; Future    Essential hypertension  -     Ambulatory referral to Nutrition Services; Future  -     lisinopril (ZESTRIL) 20 mg tablet; Take 1 tablet (20 mg total) by mouth daily          Subjective:      Patient ID: Tri Rosales is a 29 y o  female      29year-old female presenting for routine follow-up of diabetes and hypertension  Was recently started on amlodipine for hypertension, and noticed that she was having swelling and lower extremities  Swelling has gone down a bit, but is still present  Denies any pain or erythema in lower extremities  Would like to switch to a different medication  Diabetes   She presents for her follow-up diabetic visit  She has type 2 diabetes mellitus  Her disease course has been stable  There are no hypoglycemic associated symptoms  Pertinent negatives for hypoglycemia include no dizziness, headaches or speech difficulty  Pertinent negatives for diabetes include no blurred vision, no chest pain, no fatigue, no foot paresthesias, no foot ulcerations, no polydipsia, no polyphagia, no polyuria, no visual change, no weakness and no weight loss  There are no hypoglycemic complications  Symptoms are stable  There are no diabetic complications  Risk factors for coronary artery disease include diabetes mellitus, family history, hypertension and obesity  Current diabetic treatment includes oral agent (dual therapy)  She is compliant with treatment all of the time  Her weight is stable (Having difficulty losing weight )  She is following a generally healthy diet  When asked about meal planning, she reported none  She has not had a previous visit with a dietitian (Is interested in seeing one )  She participates in exercise three times a week  (Fasting blood sugar has been around 130 to 140  ) An ACE inhibitor/angiotensin II receptor blocker is not being taken  She does not see a podiatrist Eye exam is current  Hypertension   This is a chronic (Diagnosed 3 months ago) problem  The current episode started more than 1 month ago  The problem has been gradually improving since onset  The problem is controlled  Associated symptoms include peripheral edema (Started after starting amlodipine  )   Pertinent negatives include no blurred vision, chest pain, headaches, palpitations or shortness of breath  There are no associated agents to hypertension  Risk factors for coronary artery disease include diabetes mellitus and obesity  Past treatments include calcium channel blockers  The current treatment provides moderate improvement  There are no compliance problems  The following portions of the patient's history were reviewed and updated as appropriate:   She  has a past medical history of Diabetes mellitus ([facility] [address] ), Heart murmur, Heart murmur, Heart palpitations, Seasonal allergies, Ulcer, Urinary tract infection, and Wears glasses  She   Patient Active Problem List    Diagnosis Date Noted    Essential hypertension 05/23/2019    Class 2 severe obesity with serious comorbidity and body mass index (BMI) of 36 0 to 36 9 in adult [facility]) 05/23/2019    Status post myomectomy 04/22/2019    Submucous leiomyoma of uterus 04/05/2019    Menorrhagia with irregular cycle 04/05/2019    Subserous leiomyoma of uterus 03/28/2019    Type 2 diabetes mellitus without complication, without long-term current use of insulin ([facility] ) 11/20/2018    Cervical high risk HPV (human papillomavirus) test positive 09/24/2018    Encounter for annual routine gynecological examination 09/14/2018    Pelvic mass 09/14/2018     She  has a past surgical history that includes No past surgeries and pr lap,myomectomy 5/>,total wt >250 gms (N/A, 4/22/2019)  Her family history includes Cancer in her maternal aunt and paternal aunt; Diabetes in her maternal grandmother and mother; Heart disease in her family and paternal grandmother; Hypertension in her father; Ulcers in her paternal grandfather  She  reports that she has never smoked  She has never used smokeless tobacco  She reports that she does not drink alcohol or use drugs    Current Outpatient Medications   Medication Sig Dispense Refill    cyanocobalamin (VITAMIN B-12) 500 mcg tablet Take 500 mcg by mouth every morning       glimepiride (AMARYL) 4 mg tablet Take 1 tablet (4 mg total) by mouth 2 (two) times a day 180 tablet 0    ibuprofen (MOTRIN) 600 mg tablet Take 1 tablet (600 mg total) by mouth every 6 (six) hours as needed for mild pain (cramping) 30 tablet 0    Multiple Vitamin (MULTIVITAMIN) capsule Take 1 capsule by mouth every morning       sitaGLIPtin (JANUVIA) 100 mg tablet Take 1 tablet (100 mg total) by mouth every morning 90 tablet 0    acetaminophen (TYLENOL) 500 mg tablet Take 1 tablet (500 mg total) by mouth every 6 (six) hours as needed for mild pain (Patient not taking: Reported on 7/11/2019) 30 tablet 0    ketorolac (TORADOL) 10 mg tablet Take 1 tablet (10 mg total) by mouth every 6 (six) hours as needed for moderate pain (Patient not taking: Reported on 5/9/2019) 20 tablet 0    lisinopril (ZESTRIL) 20 mg tablet Take 1 tablet (20 mg total) by mouth daily 90 tablet 1    metoclopramide (REGLAN) 10 mg tablet Take 1 tablet (10 mg total) by mouth 4 (four) times a day as needed (nausea/vomiting) (Patient not taking: Reported on 6/30/2019) 20 tablet 0    misoprostol (CYTOTEC) 200 mcg tablet Take 1 tablet (200 mcg total) by mouth once for 1 dose Take 200mg night before appointment  Place intravaginally followed by a tampon  1 tablet 0     No current facility-administered medications for this visit  She is allergic to nuts; pollen extract; and strawberry extract       Review of Systems   Constitutional: Negative for activity change, appetite change, chills, diaphoresis, fatigue, fever, unexpected weight change and weight loss  HENT: Negative for nosebleeds  Eyes: Negative for blurred vision, photophobia, pain and visual disturbance  Respiratory: Negative for cough, chest tightness and shortness of breath  Cardiovascular: Negative for chest pain, palpitations and leg swelling  Gastrointestinal: Negative for abdominal pain, constipation, diarrhea, nausea and vomiting     Endocrine: Negative for polydipsia, polyphagia and polyuria  Genitourinary: Negative for frequency, hematuria and urgency  Musculoskeletal: Negative for arthralgias, joint swelling and myalgias  Skin: Negative for rash and wound  Neurological: Negative for dizziness, syncope, speech difficulty, weakness, numbness and headaches  Objective:      /78 (BP Location: Right arm, Patient Position: Sitting, Cuff Size: Large)   Pulse (!) 110   Temp (!) 97 3 °F (36 3 °C) (Temporal)   Resp 18   Wt 91 2 kg (201 lb)   SpO2 99%   BMI 36 76 kg/m²          Physical Exam   Constitutional: She is oriented to person, place, and time  She appears well-developed and well-nourished  No distress  HENT:   Right Ear: External ear normal    Left Ear: External ear normal    Nose: Nose normal    Mouth/Throat: Oropharynx is clear and moist    Eyes: Pupils are equal, round, and reactive to light  Conjunctivae and EOM are normal    Neck: Normal range of motion  Neck supple  Cardiovascular: Normal rate, regular rhythm and normal heart sounds  Exam reveals no gallop and no friction rub  No murmur heard  Pulmonary/Chest: Effort normal and breath sounds normal  No respiratory distress  She has no wheezes  She has no rales  Abdominal: Soft  Bowel sounds are normal  She exhibits no distension  There is no tenderness  There is no rebound and no guarding  Lymphadenopathy:     She has no cervical adenopathy  Neurological: She is alert and oriented to person, place, and time  She displays normal reflexes  No cranial nerve deficit  She exhibits normal muscle tone  Coordination normal    Skin: She is not diaphoretic  No erythema  Psychiatric: She has a normal mood and affect  Her behavior is normal  Thought content normal    Nursing note and vitals reviewed

## 2019-08-26 NOTE — ASSESSMENT & PLAN NOTE
Lab Results   Component Value Date    HGBA1C 7 0 (A) 08/26/2019       No results for input(s): POCGLU in the last 72 hours  Blood Sugar Average: Last 72 hrs:   A1c has been slowly at trending upwards  At this time will not make any adjustments to patient's medication  Discussed the importance of a diabetic diet  Encourage patient continue with exercise  Did inform patient about carb counting, and making sure she eats the appropriate foods  Diet should consist of lean proteins, and non starchy vegetables  Also did inform patient to monitor calorie intake  Per patient request, will refer to dietitian to help assist with appropriate diet, and also weight loss

## 2019-08-26 NOTE — LETTER
August 26, 2019     Patient: Claire Minaya   YOB: 1985   Date of Visit: 8/26/2019       To Whom it May Concern:    Senait Fuller is under my professional care  She was seen in my office on 8/26/2019  She may return to work on 8/26/2019  If you have any questions or concerns, please don't hesitate to call           Sincerely,          Marcel Hopkins PA-C        CC: No Recipients

## 2019-09-10 DIAGNOSIS — I10 ESSENTIAL HYPERTENSION: ICD-10-CM

## 2019-09-11 PROCEDURE — 4010F ACE/ARB THERAPY RXD/TAKEN: CPT | Performed by: PHYSICIAN ASSISTANT

## 2019-09-11 RX ORDER — LISINOPRIL 20 MG/1
20 TABLET ORAL DAILY
Qty: 90 TABLET | Refills: 3 | Status: SHIPPED | OUTPATIENT
Start: 2019-09-11 | End: 2020-03-17 | Stop reason: SDUPTHER

## 2019-10-01 ENCOUNTER — TELEPHONE (OUTPATIENT)
Dept: OBGYN CLINIC | Facility: CLINIC | Age: 34
End: 2019-10-01

## 2019-10-01 NOTE — TELEPHONE ENCOUNTER
Left message on patients phone that she is due for her annual and pap smear   She is to call back to schedule an apt

## 2019-10-15 ENCOUNTER — TELEPHONE (OUTPATIENT)
Dept: FAMILY MEDICINE CLINIC | Facility: CLINIC | Age: 34
End: 2019-10-15

## 2019-10-15 NOTE — TELEPHONE ENCOUNTER
Form was dropped by patient, will be forwarded to provider at assigned delivery time      iqor request medial info form

## 2019-10-17 ENCOUNTER — TELEPHONE (OUTPATIENT)
Dept: OBGYN CLINIC | Facility: CLINIC | Age: 34
End: 2019-10-17

## 2019-10-17 NOTE — TELEPHONE ENCOUNTER
Left second message on patients phone that she is due for her annual and pap smear and to call the office back to schedule this apt

## 2019-10-23 NOTE — TELEPHONE ENCOUNTER
Patient called to inform us that her mother will be picking up forms  I informed her she has to provide id in order to do so  Mothers name Abi Agustinjosh

## 2019-10-24 ENCOUNTER — TELEPHONE (OUTPATIENT)
Dept: FAMILY MEDICINE CLINIC | Facility: CLINIC | Age: 34
End: 2019-10-24

## 2019-10-24 NOTE — TELEPHONE ENCOUNTER
Kishan Ojeda called to stat that her boyfriend will be picking up her paperwork today   His name is Rocío Mckinney

## 2020-01-16 ENCOUNTER — HOSPITAL ENCOUNTER (EMERGENCY)
Facility: HOSPITAL | Age: 35
Discharge: HOME | End: 2020-01-16
Attending: EMERGENCY MEDICINE | Admitting: EMERGENCY MEDICINE

## 2020-01-16 VITALS
RESPIRATION RATE: 18 BRPM | OXYGEN SATURATION: 100 % | HEART RATE: 90 BPM | SYSTOLIC BLOOD PRESSURE: 183 MMHG | DIASTOLIC BLOOD PRESSURE: 86 MMHG | HEIGHT: 63 IN | WEIGHT: 180 LBS | TEMPERATURE: 98.4 F | BODY MASS INDEX: 31.89 KG/M2

## 2020-01-16 DIAGNOSIS — E11.9 TYPE 2 DIABETES MELLITUS WITHOUT COMPLICATION, WITHOUT LONG-TERM CURRENT USE OF INSULIN (HCC): ICD-10-CM

## 2020-01-16 DIAGNOSIS — Z76.0 ENCOUNTER FOR MEDICATION REFILL: ICD-10-CM

## 2020-01-16 DIAGNOSIS — R73.9 HYPERGLYCEMIA: Primary | ICD-10-CM

## 2020-01-16 LAB
ANION GAP SERPL CALCULATED.3IONS-SCNC: 6 MMOL/L (ref 4–13)
BUN SERPL-MCNC: 8 MG/DL (ref 5–25)
CALCIUM SERPL-MCNC: 8.7 MG/DL (ref 8.3–10.1)
CHLORIDE SERPL-SCNC: 100 MMOL/L (ref 100–108)
CO2 SERPL-SCNC: 24 MMOL/L (ref 21–32)
CREAT SERPL-MCNC: 1.01 MG/DL (ref 0.6–1.3)
GFR SERPL CREATININE-BSD FRML MDRD: 73 ML/MIN/1.73SQ M
GLUCOSE SERPL-MCNC: 468 MG/DL (ref 65–140)
POTASSIUM SERPL-SCNC: 4 MMOL/L (ref 3.5–5.3)
SODIUM SERPL-SCNC: 130 MMOL/L (ref 136–145)

## 2020-01-16 PROCEDURE — 99284 EMERGENCY DEPT VISIT MOD MDM: CPT | Performed by: EMERGENCY MEDICINE

## 2020-01-16 PROCEDURE — 96365 THER/PROPH/DIAG IV INF INIT: CPT

## 2020-01-16 PROCEDURE — 96366 THER/PROPH/DIAG IV INF ADDON: CPT

## 2020-01-16 PROCEDURE — 99285 EMERGENCY DEPT VISIT HI MDM: CPT

## 2020-01-16 PROCEDURE — 36415 COLL VENOUS BLD VENIPUNCTURE: CPT | Performed by: EMERGENCY MEDICINE

## 2020-01-16 PROCEDURE — 80048 BASIC METABOLIC PNL TOTAL CA: CPT | Performed by: EMERGENCY MEDICINE

## 2020-01-16 RX ORDER — GLIMEPIRIDE 4 MG/1
4 TABLET ORAL 2 TIMES DAILY
Qty: 28 TABLET | Refills: 0 | Status: SHIPPED | OUTPATIENT
Start: 2020-01-16 | End: 2020-03-17 | Stop reason: SDUPTHER

## 2020-01-16 RX ADMIN — SODIUM CHLORIDE, SODIUM LACTATE, POTASSIUM CHLORIDE, AND CALCIUM CHLORIDE 1000 ML: .6; .31; .03; .02 INJECTION, SOLUTION INTRAVENOUS at 10:55

## 2020-01-16 RX ADMIN — SITAGLIPTIN 100 MG: 100 TABLET, FILM COATED ORAL at 12:57

## 2020-01-16 NOTE — ED PROVIDER NOTES
Final Diagnosis:  1  Hyperglycemia    2  Encounter for medication refill    3  Type 2 diabetes mellitus without complication, without long-term current use of insulin [facility])      Chief Complaint   Patient presents with    Hyperglycemia - Symptomatic     Pt began not feeling well today at work, assoc dizziness  Pt thought blood sugar was low, so she had a honey packet  Called EMS  XHNH=525ap/dL  Pt has run out of her Januvia and has not taken it for several days  Pt denies N/V diaphoresis  This is a 70-year-old female presenting for evaluation of hyperglycemia, asymptomatic  The patient states that she has normally fairly good control of her blood sugar  Results for Ghulam Cuevas (MRN [id number]) as of 1/16/2020 10:42   8/26/2019 10:32   Hemoglobin A1C 7 0 (A)   Patient states that today she was feeling unwell, a little bit like she would get when she has lower blood sugar, checked it, found that it was actually high, around 507  EMS was then called who brought her in  She specifically denies any fevers chills nausea vomiting chest pain shortness of breath palpitations  Denies urinary symptoms including burning itching pain blood frequency  Denies diarrhea or constipation  Denies changes in oral intake  She has been out of for Januvia for 2 days  She is on another medication besides this  She has an appointment with her PCP in 1 week  PMH:  - Ulcers  - Heart murmur  - UTI  - DM  PSH:  - Myomectomy by gynecology  No smoking drinking drugs  PE:   Vitals:    01/16/20 1041 01/16/20 1130 01/16/20 1200 01/16/20 1230   BP: (!) 184/81 (!) 176/89 151/98 (!) 183/86   BP Location: Right arm Left arm Right arm Right arm   Pulse: 100 92 94 90   Resp: 20 20 18 18   Temp: 98 4 °F (36 9 °C)      TempSrc: Oral      SpO2: 99% 99% 100% 100%   Weight: 81 6 kg (180 lb)      Height: 5' 2 5" (1 588 m)      General: VSS, NAD, awake, alert  Well-nourished, well-developed  Appears stated age     Head: Normocephalic, atraumatic, nontender  Eyes: PERRL, EOM-I  No diplopia  No hyphema  No subconjunctival hemorrhages  Symmetrical lids  ENT: Atraumatic external nose and ears  Pharynx normal    Mildly dry MM  No malocclusion  No stridor  Normal phonation  No drooling  Normal swallowing  Neck: Symmetric, trachea midline  No JVD  CV: Mild tachycardia  +S1/S2  No murmurs or gallops  Peripheral pulses +2 throughout  No chest wall tenderness  Lungs:   Unlabored No retractions  CTAB, lungs sounds equal bilateral    No crepitus  No tachypnea  No paradoxical motion  Abd: +BS, soft, NT/ND  No guarding  No rigidity  No rebound  MSK:   FROM   No lower extremity edema  Back:   No CVAT  Skin: Dry, intact  Neuro: AAOx3, GCS 15, CN II-XII grossly intact  Motor grossly intact  Psychiatric/Behavioral: Appropriate mood and affect   Exam: deferred  A:  - Hyperglycemia  - Out of medication  P:  - Will refill her medication  - Will check BMP for kidney function first  - likely DC after IV hydration  She does appear a little dry  - ED discharge glucose in patients with moderate to severe hyperglycemia was not associated with 7-day outcomes of repeat ED visit for hyperglycemia or hospitalization  Attaining a specific glucose goal before discharge in patients does not serve a function and so will not be pursued  ([address] [facility] 2016 Jun 25  pii: -[id number]  doi: 10 1016/j annemergmed  2016 04 057 )  - 13 point ROS was performed and all are normal unless stated in the history above  - Nursing note reviewed  Vitals reviewed  - Orders placed by myself and/or advanced practitioner / resident     - Previous chart was reviewed  - No language barrier    - History obtained from patient  - There are no limitations to the history obtained  - Critical care time: Not applicable for this patient     ED Course as of Jan 16 1302   Thu Jan 16, 2020   1141 Creatinine: 1 01   1141 BUN: 8   1148 I reviewed all testing with the patient  Awaiting completion of fluids + medications    I gave oral return precautions for what to return for in addition to the written return precautions  The patient verbalized understanding of the discharge instructions and warnings that would necessitate return to the Emergency Department  I specifically highlighted areas of special concern regarding the written and verbal discharge instructions and return precautions  All questions were answered prior to discharge  Medications   lactated ringers bolus 1,000 mL (0 mL Intravenous Stopped 1/16/20 1244)   sitaGLIPtin (JANUVIA) tablet 100 mg (100 mg Oral Given 1/16/20 1257)     No orders to display     Orders Placed This Encounter   Procedures    Basic metabolic panel     Labs Reviewed   BASIC METABOLIC PANEL - Abnormal       Result Value Ref Range Status    Sodium 130 (*) 136 - 145 mmol/L Final    Potassium 4 0  3 5 - 5 3 mmol/L Final    Chloride 100  100 - 108 mmol/L Final    CO2 24  21 - 32 mmol/L Final    ANION GAP 6  4 - 13 mmol/L Final    BUN 8  5 - 25 mg/dL Final    Creatinine 1 01  0 60 - 1 30 mg/dL Final    Comment: Standardized to IDMS reference method    Glucose 468 (*) 65 - 140 mg/dL Final    Comment:   If the patient is fasting, the ADA then defines impaired fasting glucose as > 100 mg/dL and diabetes as > or equal to 123 mg/dL  Specimen collection should occur prior to Sulfasalazine administration due to the potential for falsely depressed results  Specimen collection should occur prior to Sulfapyridine administration due to the potential for falsely elevated results      Calcium 8 7  8 3 - 10 1 mg/dL Final    eGFR 73  ml/min/1 73sq m Final    Narrative:     [place] guidelines for Chronic Kidney Disease (CKD):     Stage 1 with normal or high GFR (GFR > 90 mL/min/1 73 square meters)    Stage 2 Mild CKD (GFR = 60-89 mL/min/1 73 square meters)    Stage 3A Moderate CKD (GFR = 45-59 mL/min/1 73 square meters)    Stage 3B Moderate CKD (GFR = 30-44 mL/min/1 73 square meters)    Stage 4 Severe CKD (GFR = 15-29 mL/min/1 73 square meters)    Stage 5 End Stage CKD (GFR <15 mL/min/1 73 square meters)  Note: GFR calculation is accurate only with a steady state creatinine     Time reflects when diagnosis was documented in both MDM as applicable and the Disposition within this note     Time User Action Codes Description Comment    1/16/2020 10:46 AM Londell Nestle Add [R73 9] Hyperglycemia     1/16/2020 10:46 AM Londell Nestle Add [Z76 0] Encounter for medication refill     1/16/2020 10:47 AM Londell Nestle Add [E11 9] Type 2 diabetes mellitus without complication, without long-term current use of insulin ([facility] )     1/16/2020 10:47 AM Londell Nestle Modify [E11 9] Type 2 diabetes mellitus without complication, without long-term current use of insulin ([facility] )     1/16/2020 10:47 AM Londell Nestle Modify [E11 9] Type 2 diabetes mellitus without complication, without long-term current use of insulin ([facility] )     1/16/2020 10:48 AM Londell Nestle Modify [E11 9] Type 2 diabetes mellitus without complication, without long-term current use of insulin [facility])       ED Disposition     ED Disposition Condition Date/Time Comment    Discharge Stable Thu Jan 16, 2020 10:46 AM Nai Whyte discharge to home/self care              Follow-up Information     Follow up With Specialties Details Why Contact Info Additional Information    Chey Triplett PA-C Family Medicine, Physician Assistant Call  To make appointment for re-evaluation in 1 week [address]  [address]       [address] [place] [address] Emergency Department Emergency Medicine Go to  If symptoms worsen [address]  [phone]  ED, [address] [place] [address], [address] [phone]        Patient's Medications   Discharge Prescriptions    SITAGLIPTIN (Fatuma Dill) 100 MG TABLET    Take 1 tablet (100 mg total) by mouth daily for 14 days       Start Date: 1/16/2020 End Date: 1/30/2020       Order Dose: 100 mg       Quantity: 14 tablet    Refills: 0     No discharge procedures on file  Prior to Admission Medications   Prescriptions Last Dose Informant Patient Reported? Taking? Multiple Vitamin (MULTIVITAMIN) capsule 1/15/2020 at Unknown time Self Yes Yes   Sig: Take 1 capsule by mouth every morning    cyanocobalamin (VITAMIN B-12) 500 mcg tablet 1/15/2020 at Unknown time Self Yes Yes   Sig: Take 500 mcg by mouth every morning    glimepiride (AMARYL) 4 mg tablet 1/15/2020 at Unknown time  No Yes   Sig: Take 1 tablet (4 mg total) by mouth 2 (two) times a day   glimepiride (AMARYL) 4 mg tablet   No No   Sig: Take 1 tablet (4 mg total) by mouth 2 (two) times a day for 14 days   ibuprofen (MOTRIN) 600 mg tablet More than a month at Unknown time  No No   Sig: Take 1 tablet (600 mg total) by mouth every 6 (six) hours as needed for mild pain (cramping)   lisinopril (ZESTRIL) 20 mg tablet 1/15/2020 at Unknown time  No Yes   Sig: Take 1 tablet (20 mg total) by mouth daily   misoprostol (CYTOTEC) 200 mcg tablet   No No   Sig: Take 1 tablet (200 mcg total) by mouth once for 1 dose Take 200mg night before appointment  Place intravaginally followed by a tampon  sitaGLIPtin (JANUVIA) 100 mg tablet Past Week at Unknown time  No Yes   Sig: Take 1 tablet (100 mg total) by mouth every morning      Facility-Administered Medications: None       Portions of the record may have been created with voice recognition software  Occasional wrong word or "sound a like" substitutions may have occurred due to the inherent limitations of voice recognition software  Read the chart carefully and recognize, using context, where substitutions have occurred      Electronically signed by:  Davin Harvey MD  01/16/20 1819

## 2020-01-16 NOTE — ED NOTES
Assumed care of pt at this time, pt receiving IV fluids, resting in position of comfort, offers no complaints     Lucita Saravia RN  01/16/20 9698

## 2020-03-05 ENCOUNTER — TELEPHONE (OUTPATIENT)
Dept: FAMILY MEDICINE CLINIC | Facility: CLINIC | Age: 35
End: 2020-03-05

## 2020-03-17 ENCOUNTER — OFFICE VISIT (OUTPATIENT)
Dept: FAMILY MEDICINE CLINIC | Facility: CLINIC | Age: 35
End: 2020-03-17

## 2020-03-17 VITALS
TEMPERATURE: 98.2 F | HEIGHT: 63 IN | BODY MASS INDEX: 30.85 KG/M2 | OXYGEN SATURATION: 98 % | SYSTOLIC BLOOD PRESSURE: 166 MMHG | HEART RATE: 112 BPM | WEIGHT: 174.13 LBS | DIASTOLIC BLOOD PRESSURE: 78 MMHG | RESPIRATION RATE: 18 BRPM

## 2020-03-17 DIAGNOSIS — I10 ESSENTIAL HYPERTENSION: ICD-10-CM

## 2020-03-17 DIAGNOSIS — E11.9 TYPE 2 DIABETES MELLITUS WITHOUT COMPLICATION, WITHOUT LONG-TERM CURRENT USE OF INSULIN (HCC): Primary | ICD-10-CM

## 2020-03-17 LAB — SL AMB POCT HEMOGLOBIN AIC: 14.2 (ref ?–6.5)

## 2020-03-17 PROCEDURE — 4010F ACE/ARB THERAPY RXD/TAKEN: CPT | Performed by: PHYSICIAN ASSISTANT

## 2020-03-17 PROCEDURE — 99214 OFFICE O/P EST MOD 30 MIN: CPT | Performed by: PHYSICIAN ASSISTANT

## 2020-03-17 PROCEDURE — 1036F TOBACCO NON-USER: CPT | Performed by: PHYSICIAN ASSISTANT

## 2020-03-17 PROCEDURE — 83036 HEMOGLOBIN GLYCOSYLATED A1C: CPT | Performed by: PHYSICIAN ASSISTANT

## 2020-03-17 PROCEDURE — 3077F SYST BP >= 140 MM HG: CPT | Performed by: PHYSICIAN ASSISTANT

## 2020-03-17 PROCEDURE — 3078F DIAST BP <80 MM HG: CPT | Performed by: PHYSICIAN ASSISTANT

## 2020-03-17 PROCEDURE — 3008F BODY MASS INDEX DOCD: CPT | Performed by: PHYSICIAN ASSISTANT

## 2020-03-17 PROCEDURE — 3046F HEMOGLOBIN A1C LEVEL >9.0%: CPT | Performed by: PHYSICIAN ASSISTANT

## 2020-03-17 RX ORDER — GLIMEPIRIDE 4 MG/1
4 TABLET ORAL 2 TIMES DAILY
Qty: 180 TABLET | Refills: 1 | Status: SHIPPED | OUTPATIENT
Start: 2020-03-17

## 2020-03-17 RX ORDER — LISINOPRIL 20 MG/1
20 TABLET ORAL DAILY
Qty: 90 TABLET | Refills: 3 | Status: SHIPPED | OUTPATIENT
Start: 2020-03-17

## 2020-03-17 NOTE — ASSESSMENT & PLAN NOTE
Lab Results   Component Value Date    HGBA1C 14 2 (A) 03/17/2020    Significant increase in patient's A1c since last appointment  Has not been taking diabetes medication  Patient had side effects with metformin  Does have financial issues at this time, so will send over glimepiride taken twice a day  Will see if we can have social work assist in getting assistance in pain for [address] until patient can get insurance  Discussed the importance of a low-carbohydrate diet  Will contact [facility]- for last diabetic eye exam results  Make sure to follow-up in 3 months to recheck A1c

## 2020-03-17 NOTE — ASSESSMENT & PLAN NOTE
Blood pressure elevated today, has not been taking her lisinopril  Did inform patient that medication should be on the 10 dollar list at any pharmacy  Discussed complications if blood pressure is not controlled

## 2020-03-17 NOTE — LETTER
March 17, 2020     Patient: Rivera Adorno   YOB: 1985   Date of Visit: 3/17/2020       To Whom it May Concern:    Karina Garcia is under my professional care  She was seen in my office on 3/17/2020  She may return to work on 3/18/2020  If you have any questions or concerns, please don't hesitate to call           Sincerely,          Amado Mayes PA-C        CC: No Recipients

## 2020-03-17 NOTE — PROGRESS NOTES
Assessment/Plan:    Type 2 diabetes mellitus without complication, without long-term current use of insulin ([facility])    Lab Results   Component Value Date    HGBA1C 14 2 (A) 03/17/2020    Significant increase in patient's A1c since last appointment  Has not been taking diabetes medication  Patient had side effects with metformin  Does have financial issues at this time, so will send over glimepiride taken twice a day  Will see if we can have social work assist in getting assistance in pain for [address] until patient can get insurance  Discussed the importance of a low-carbohydrate diet  Will contact [facility] for last diabetic eye exam results  Make sure to follow-up in 3 months to recheck A1c  Essential hypertension  Blood pressure elevated today, has not been taking her lisinopril  Did inform patient that medication should be on the 10 dollar list at any pharmacy  Discussed complications if blood pressure is not controlled  Discussed with patient possible causes of nausea including her menses, elevated glucose level, or acute gastritis  Recommend brat diet  If symptoms get worse, would recommend making a follow-up appointment  Diagnoses and all orders for this visit:    Type 2 diabetes mellitus without complication, without long-term current use of insulin ([facility])  -     POCT hemoglobin A1c  -     glimepiride (AMARYL) 4 mg tablet; Take 1 tablet (4 mg total) by mouth 2 (two) times a day    Essential hypertension  -     lisinopril (ZESTRIL) 20 mg tablet; Take 1 tablet (20 mg total) by mouth daily          Subjective:      Patient ID: Eddie Hernandez is a 29 y o  female  68-year-old female presenting for concerns of nausea that started yesterday  Patient has also not been seen for over 6 months for her diabetes  Patient states that she started feeling episodes of nausea and some epigastric discomfort  Was concern, and took a pregnancy test in came back negative    Has not had any vomiting, but states that she did have some taste a bile in her mouth  Symptoms appear to be subsiding at this time  Has not taking anything over-the-counter  Denies any fevers, chills, diarrhea, constipation, blood in stool  Believes that the nausea may also be related to her menses  Patient is currently not taking anything for her diabetes  Does not have insurance, and was previously taking Januvia which she believes significantly did improve her A1c  Was previously on glimepiride, but does not recall the last time she had the medication  Has not been checking her blood sugar on routine basis  Has been trying to lose weight  Was trying a keto diet, but believes that has not been helping anymore  Is thinking about switching back to a Mediterranean diet to help lower her blood sugar  The following portions of the patient's history were reviewed and updated as appropriate:   She  has a past medical history of Diabetes mellitus ([facility] [address] ), Heart murmur, Heart murmur, Heart palpitations, Seasonal allergies, Ulcer, Urinary tract infection, and Wears glasses  She   Patient Active Problem List    Diagnosis Date Noted    Essential hypertension 05/23/2019    Class 2 severe obesity with serious comorbidity and body mass index (BMI) of 36 0 to 36 9 in adult [facility]) 05/23/2019    Status post myomectomy 04/22/2019    Submucous leiomyoma of uterus 04/05/2019    Menorrhagia with irregular cycle 04/05/2019    Subserous leiomyoma of uterus 03/28/2019    Type 2 diabetes mellitus without complication, without long-term current use of insulin ([facility] [address] ) 11/20/2018    Cervical high risk HPV (human papillomavirus) test positive 09/24/2018    Encounter for annual routine gynecological examination 09/14/2018    Pelvic mass 09/14/2018     She  has a past surgical history that includes No past surgeries and pr lap,myomectomy 5/>,total wt >250 gms (N/A, 4/22/2019)    Her family history includes Cancer in her maternal aunt and paternal aunt; Diabetes in her maternal grandmother and mother; Heart disease in her family and paternal grandmother; Hypertension in her father; Ulcers in her paternal grandfather  She  reports that she has never smoked  She has never used smokeless tobacco  She reports that she does not drink alcohol or use drugs  Current Outpatient Medications   Medication Sig Dispense Refill    cyanocobalamin (VITAMIN B-12) 500 mcg tablet Take 500 mcg by mouth every morning       ibuprofen (MOTRIN) 600 mg tablet Take 1 tablet (600 mg total) by mouth every 6 (six) hours as needed for mild pain (cramping) 30 tablet 0    lisinopril (ZESTRIL) 20 mg tablet Take 1 tablet (20 mg total) by mouth daily 90 tablet 3    Multiple Vitamin (MULTIVITAMIN) capsule Take 1 capsule by mouth every morning       sitaGLIPtin (JANUVIA) 100 mg tablet Take 1 tablet (100 mg total) by mouth every morning 90 tablet 0    glimepiride (AMARYL) 4 mg tablet Take 1 tablet (4 mg total) by mouth 2 (two) times a day 180 tablet 1    misoprostol (CYTOTEC) 200 mcg tablet Take 1 tablet (200 mcg total) by mouth once for 1 dose Take 200mg night before appointment  Place intravaginally followed by a tampon  1 tablet 0    sitaGLIPtin (JANUVIA) 100 mg tablet Take 1 tablet (100 mg total) by mouth daily for 14 days 14 tablet 0     No current facility-administered medications for this visit  She is allergic to nuts; pollen extract; and strawberry extract       Review of Systems   Constitutional: Negative for activity change, appetite change, chills, diaphoresis, fatigue, fever and unexpected weight change  Eyes: Negative for pain and visual disturbance  Respiratory: Negative for chest tightness and shortness of breath  Cardiovascular: Negative for chest pain, palpitations and leg swelling  Gastrointestinal: Positive for abdominal pain and nausea  Negative for constipation, diarrhea and vomiting     Endocrine: Negative for polydipsia, polyphagia and polyuria  Genitourinary: Negative for dysuria, frequency, hematuria, menstrual problem and urgency  Skin: Negative for rash and wound  Neurological: Negative for dizziness, syncope, weakness, numbness and headaches  Psychiatric/Behavioral: Negative for dysphoric mood and sleep disturbance  The patient is not nervous/anxious  Objective:      /78 (BP Location: Left arm, Patient Position: Sitting, Cuff Size: Standard)   Pulse (!) 112   Temp 98 2 °F (36 8 °C) (Temporal)   Resp 18   Ht 5' 2 5" (1 588 m)   Wt 79 kg (174 lb 2 oz)   LMP 02/19/2020 (Approximate)   SpO2 98%   BMI 31 34 kg/m²          Physical Exam   Constitutional: She is oriented to person, place, and time  She appears well-developed and well-nourished  No distress  Eyes: Pupils are equal, round, and reactive to light  Conjunctivae and EOM are normal    Neck: Normal range of motion  Neck supple  Cardiovascular: Normal rate, regular rhythm and normal heart sounds  Exam reveals no gallop and no friction rub  Pulses are no weak pulses  No murmur heard  Pulses:       Dorsalis pedis pulses are 2+ on the right side, and 2+ on the left side  Posterior tibial pulses are 2+ on the right side, and 2+ on the left side  Pulmonary/Chest: Effort normal and breath sounds normal  No respiratory distress  She has no wheezes  Abdominal: Soft  Bowel sounds are normal  She exhibits no distension  There is no tenderness  There is no guarding  Musculoskeletal: Normal range of motion  She exhibits no edema  Feet:   Right Foot:   Skin Integrity: Negative for ulcer, skin breakdown, erythema, warmth, callus or dry skin  Left Foot:   Skin Integrity: Positive for callus  Negative for ulcer, skin breakdown, erythema, warmth or dry skin  Lymphadenopathy:     She has no cervical adenopathy  Neurological: She is alert and oriented to person, place, and time  She displays normal reflexes  No cranial nerve deficit   She exhibits normal muscle tone  Coordination normal    Skin: She is not diaphoretic  Nursing note and vitals reviewed  Patient's shoes and socks removed  Right Foot/Ankle   Right Foot Inspection  Skin Exam: skin normal and skin intact no dry skin, no warmth, no callus, no erythema, no maceration, no abnormal color, no pre-ulcer, no ulcer and no callus                          Toe Exam: ROM and strength within normal limitsno tenderness, erythema and  no right toe deformity  Sensory     Proprioception: intact   Monofilament testing: intact  Vascular  Capillary refills: < 3 seconds  The right DP pulse is 2+  The right PT pulse is 2+  Left Foot/Ankle  Left Foot Inspection  Skin Exam: skin normal, skin intact and callusno dry skin, no warmth, no erythema, no maceration, normal color, no pre-ulcer and no ulcer                         Toe Exam: ROM and strength within normal limitsno tenderness, no erythema and no left toe deformity                   Sensory     Proprioception: intact  Monofilament: intact  Vascular  Capillary refills: < 3 seconds  The left DP pulse is 2+  The left PT pulse is 2+  Assign Risk Category:  No deformity present; No loss of protective sensation;  No weak pulses       Risk: 0

## 2020-04-16 ENCOUNTER — TELEPHONE (OUTPATIENT)
Dept: FAMILY MEDICINE CLINIC | Facility: CLINIC | Age: 35
End: 2020-04-16

## 2020-06-26 ENCOUNTER — HOSPITAL ENCOUNTER (OUTPATIENT)
Facility: HOSPITAL | Age: 35
Setting detail: OBSERVATION
Discharge: HOME | End: 2020-06-28
Attending: EMERGENCY MEDICINE | Admitting: INTERNAL MEDICINE
Payer: COMMERCIAL

## 2020-06-26 ENCOUNTER — APPOINTMENT (EMERGENCY)
Dept: CT IMAGING | Facility: HOSPITAL | Age: 35
End: 2020-06-26
Payer: COMMERCIAL

## 2020-06-26 DIAGNOSIS — R93.5 ABNORMAL CT OF THE ABDOMEN: ICD-10-CM

## 2020-06-26 DIAGNOSIS — N39.0 UTI (URINARY TRACT INFECTION): Primary | ICD-10-CM

## 2020-06-26 LAB
ALBUMIN SERPL BCP-MCNC: 3.4 G/DL (ref 3.5–5)
ALP SERPL-CCNC: 56 U/L (ref 46–116)
ALT SERPL W P-5'-P-CCNC: 18 U/L (ref 12–78)
ANION GAP SERPL CALCULATED.3IONS-SCNC: 9 MMOL/L (ref 4–13)
AST SERPL W P-5'-P-CCNC: 25 U/L (ref 5–45)
BACTERIA UR QL AUTO: ABNORMAL /HPF
BASOPHILS # BLD AUTO: 0.05 THOUSANDS/ΜL (ref 0–0.1)
BASOPHILS NFR BLD AUTO: 1 % (ref 0–1)
BILIRUB SERPL-MCNC: 0.4 MG/DL (ref 0.2–1)
BILIRUB UR QL STRIP: NEGATIVE
BUN SERPL-MCNC: 14 MG/DL (ref 5–25)
CALCIUM SERPL-MCNC: 9.4 MG/DL (ref 8.3–10.1)
CHLORIDE SERPL-SCNC: 100 MMOL/L (ref 100–108)
CLARITY UR: ABNORMAL
CO2 SERPL-SCNC: 27 MMOL/L (ref 21–32)
COLOR UR: YELLOW
CREAT SERPL-MCNC: 1.02 MG/DL (ref 0.6–1.3)
EOSINOPHIL # BLD AUTO: 0.01 THOUSAND/ΜL (ref 0–0.61)
EOSINOPHIL NFR BLD AUTO: 0 % (ref 0–6)
ERYTHROCYTE [DISTWIDTH] IN BLOOD BY AUTOMATED COUNT: 13.4 % (ref 11.6–15.1)
EXT PREG TEST URINE: NEGATIVE
EXT. CONTROL ED NAV: NORMAL
GFR SERPL CREATININE-BSD FRML MDRD: 83 ML/MIN/1.73SQ M
GLUCOSE SERPL-MCNC: 190 MG/DL (ref 65–140)
GLUCOSE UR STRIP-MCNC: NEGATIVE MG/DL
HCT VFR BLD AUTO: 37.9 % (ref 34.8–46.1)
HGB BLD-MCNC: 12.5 G/DL (ref 11.5–15.4)
HGB UR QL STRIP.AUTO: ABNORMAL
IMM GRANULOCYTES # BLD AUTO: 0.04 THOUSAND/UL (ref 0–0.2)
IMM GRANULOCYTES NFR BLD AUTO: 0 % (ref 0–2)
KETONES UR STRIP-MCNC: ABNORMAL MG/DL
LEUKOCYTE ESTERASE UR QL STRIP: ABNORMAL
LIPASE SERPL-CCNC: 94 U/L (ref 73–393)
LYMPHOCYTES # BLD AUTO: 1.63 THOUSANDS/ΜL (ref 0.6–4.47)
LYMPHOCYTES NFR BLD AUTO: 17 % (ref 14–44)
MCH RBC QN AUTO: 30.2 PG (ref 26.8–34.3)
MCHC RBC AUTO-ENTMCNC: 33 G/DL (ref 31.4–37.4)
MCV RBC AUTO: 92 FL (ref 82–98)
MONOCYTES # BLD AUTO: 0.48 THOUSAND/ΜL (ref 0.17–1.22)
MONOCYTES NFR BLD AUTO: 5 % (ref 4–12)
NEUTROPHILS # BLD AUTO: 7.44 THOUSANDS/ΜL (ref 1.85–7.62)
NEUTS SEG NFR BLD AUTO: 77 % (ref 43–75)
NITRITE UR QL STRIP: NEGATIVE
NON-SQ EPI CELLS URNS QL MICRO: ABNORMAL /HPF
NRBC BLD AUTO-RTO: 0 /100 WBCS
PH UR STRIP.AUTO: 5 [PH] (ref 4.5–8)
PLATELET # BLD AUTO: 416 THOUSANDS/UL (ref 149–390)
PMV BLD AUTO: 9 FL (ref 8.9–12.7)
POTASSIUM SERPL-SCNC: 4.5 MMOL/L (ref 3.5–5.3)
PROT SERPL-MCNC: 8.3 G/DL (ref 6.4–8.2)
PROT UR STRIP-MCNC: >=300 MG/DL
RBC # BLD AUTO: 4.14 MILLION/UL (ref 3.81–5.12)
RBC #/AREA URNS AUTO: ABNORMAL /HPF
SODIUM SERPL-SCNC: 136 MMOL/L (ref 136–145)
SP GR UR STRIP.AUTO: >=1.03 (ref 1–1.03)
UROBILINOGEN UR QL STRIP.AUTO: 1 E.U./DL
WBC # BLD AUTO: 9.65 THOUSAND/UL (ref 4.31–10.16)
WBC #/AREA URNS AUTO: ABNORMAL /HPF

## 2020-06-26 PROCEDURE — 87077 CULTURE AEROBIC IDENTIFY: CPT

## 2020-06-26 PROCEDURE — 99285 EMERGENCY DEPT VISIT HI MDM: CPT

## 2020-06-26 PROCEDURE — 81025 URINE PREGNANCY TEST: CPT | Performed by: PHYSICIAN ASSISTANT

## 2020-06-26 PROCEDURE — 87086 URINE CULTURE/COLONY COUNT: CPT

## 2020-06-26 PROCEDURE — 96374 THER/PROPH/DIAG INJ IV PUSH: CPT

## 2020-06-26 PROCEDURE — 96361 HYDRATE IV INFUSION ADD-ON: CPT

## 2020-06-26 PROCEDURE — 81001 URINALYSIS AUTO W/SCOPE: CPT

## 2020-06-26 PROCEDURE — 36415 COLL VENOUS BLD VENIPUNCTURE: CPT | Performed by: PHYSICIAN ASSISTANT

## 2020-06-26 PROCEDURE — 85025 COMPLETE CBC W/AUTO DIFF WBC: CPT | Performed by: PHYSICIAN ASSISTANT

## 2020-06-26 PROCEDURE — 87186 SC STD MICRODIL/AGAR DIL: CPT

## 2020-06-26 PROCEDURE — 80053 COMPREHEN METABOLIC PANEL: CPT | Performed by: PHYSICIAN ASSISTANT

## 2020-06-26 PROCEDURE — 83690 ASSAY OF LIPASE: CPT | Performed by: PHYSICIAN ASSISTANT

## 2020-06-26 PROCEDURE — 99285 EMERGENCY DEPT VISIT HI MDM: CPT | Performed by: EMERGENCY MEDICINE

## 2020-06-26 PROCEDURE — 96375 TX/PRO/DX INJ NEW DRUG ADDON: CPT

## 2020-06-26 PROCEDURE — 74177 CT ABD & PELVIS W/CONTRAST: CPT

## 2020-06-26 RX ORDER — CEPHALEXIN 250 MG/1
500 CAPSULE ORAL ONCE
Status: COMPLETED | OUTPATIENT
Start: 2020-06-26 | End: 2020-06-26

## 2020-06-26 RX ORDER — ONDANSETRON 2 MG/ML
4 INJECTION INTRAMUSCULAR; INTRAVENOUS ONCE
Status: COMPLETED | OUTPATIENT
Start: 2020-06-26 | End: 2020-06-26

## 2020-06-26 RX ORDER — KETOROLAC TROMETHAMINE 30 MG/ML
15 INJECTION, SOLUTION INTRAMUSCULAR; INTRAVENOUS ONCE
Status: COMPLETED | OUTPATIENT
Start: 2020-06-26 | End: 2020-06-26

## 2020-06-26 RX ADMIN — IOHEXOL 100 ML: 350 INJECTION, SOLUTION INTRAVENOUS at 22:15

## 2020-06-26 RX ADMIN — ONDANSETRON 4 MG: 2 INJECTION INTRAMUSCULAR; INTRAVENOUS at 21:25

## 2020-06-26 RX ADMIN — SODIUM CHLORIDE 1000 ML: 0.9 INJECTION, SOLUTION INTRAVENOUS at 21:24

## 2020-06-26 RX ADMIN — KETOROLAC TROMETHAMINE 15 MG: 30 INJECTION, SOLUTION INTRAMUSCULAR at 21:24

## 2020-06-26 RX ADMIN — CEPHALEXIN 500 MG: 250 CAPSULE ORAL at 23:57

## 2020-06-27 ENCOUNTER — APPOINTMENT (OUTPATIENT)
Dept: CT IMAGING | Facility: HOSPITAL | Age: 35
End: 2020-06-27
Payer: COMMERCIAL

## 2020-06-27 PROBLEM — R10.9 ABDOMINAL PAIN: Status: ACTIVE | Noted: 2020-06-27

## 2020-06-27 PROBLEM — N39.0 UTI (URINARY TRACT INFECTION): Status: ACTIVE | Noted: 2020-06-27

## 2020-06-27 LAB
ANION GAP SERPL CALCULATED.3IONS-SCNC: 10 MMOL/L (ref 4–13)
BUN SERPL-MCNC: 15 MG/DL (ref 5–25)
CALCIUM SERPL-MCNC: 8.6 MG/DL (ref 8.3–10.1)
CHLORIDE SERPL-SCNC: 104 MMOL/L (ref 100–108)
CO2 SERPL-SCNC: 23 MMOL/L (ref 21–32)
CREAT SERPL-MCNC: 0.92 MG/DL (ref 0.6–1.3)
ERYTHROCYTE [DISTWIDTH] IN BLOOD BY AUTOMATED COUNT: 13.3 % (ref 11.6–15.1)
EST. AVERAGE GLUCOSE BLD GHB EST-MCNC: 192 MG/DL
GFR SERPL CREATININE-BSD FRML MDRD: 94 ML/MIN/1.73SQ M
GLUCOSE P FAST SERPL-MCNC: 113 MG/DL (ref 65–99)
GLUCOSE SERPL-MCNC: 113 MG/DL (ref 65–140)
GLUCOSE SERPL-MCNC: 115 MG/DL (ref 65–140)
GLUCOSE SERPL-MCNC: 202 MG/DL (ref 65–140)
GLUCOSE SERPL-MCNC: 214 MG/DL (ref 65–140)
GLUCOSE SERPL-MCNC: 216 MG/DL (ref 65–140)
HBA1C MFR BLD: 8.3 %
HCT VFR BLD AUTO: 32.5 % (ref 34.8–46.1)
HGB BLD-MCNC: 10.4 G/DL (ref 11.5–15.4)
MCH RBC QN AUTO: 29.3 PG (ref 26.8–34.3)
MCHC RBC AUTO-ENTMCNC: 32 G/DL (ref 31.4–37.4)
MCV RBC AUTO: 92 FL (ref 82–98)
PLATELET # BLD AUTO: 373 THOUSANDS/UL (ref 149–390)
PMV BLD AUTO: 8.6 FL (ref 8.9–12.7)
POTASSIUM SERPL-SCNC: 3.3 MMOL/L (ref 3.5–5.3)
RBC # BLD AUTO: 3.55 MILLION/UL (ref 3.81–5.12)
SODIUM SERPL-SCNC: 137 MMOL/L (ref 136–145)
WBC # BLD AUTO: 8.54 THOUSAND/UL (ref 4.31–10.16)

## 2020-06-27 PROCEDURE — 83036 HEMOGLOBIN GLYCOSYLATED A1C: CPT | Performed by: PHYSICIAN ASSISTANT

## 2020-06-27 PROCEDURE — 99244 OFF/OP CNSLTJ NEW/EST MOD 40: CPT | Performed by: PHYSICIAN ASSISTANT

## 2020-06-27 PROCEDURE — 82948 REAGENT STRIP/BLOOD GLUCOSE: CPT

## 2020-06-27 PROCEDURE — 80048 BASIC METABOLIC PNL TOTAL CA: CPT | Performed by: PHYSICIAN ASSISTANT

## 2020-06-27 PROCEDURE — 85027 COMPLETE CBC AUTOMATED: CPT | Performed by: PHYSICIAN ASSISTANT

## 2020-06-27 PROCEDURE — 99220 PR INITIAL OBSERVATION CARE/DAY 70 MINUTES: CPT | Performed by: PHYSICIAN ASSISTANT

## 2020-06-27 PROCEDURE — 72192 CT PELVIS W/O DYE: CPT

## 2020-06-27 RX ORDER — ONDANSETRON 2 MG/ML
4 INJECTION INTRAMUSCULAR; INTRAVENOUS EVERY 6 HOURS PRN
Status: DISCONTINUED | OUTPATIENT
Start: 2020-06-27 | End: 2020-06-28 | Stop reason: HOSPADM

## 2020-06-27 RX ORDER — MORPHINE SULFATE 4 MG/ML
4 INJECTION, SOLUTION INTRAMUSCULAR; INTRAVENOUS EVERY 4 HOURS PRN
Status: DISCONTINUED | OUTPATIENT
Start: 2020-06-27 | End: 2020-06-28 | Stop reason: HOSPADM

## 2020-06-27 RX ORDER — SODIUM CHLORIDE 9 MG/ML
100 INJECTION, SOLUTION INTRAVENOUS CONTINUOUS
Status: DISCONTINUED | OUTPATIENT
Start: 2020-06-27 | End: 2020-06-27

## 2020-06-27 RX ORDER — CEPHALEXIN 500 MG/1
500 CAPSULE ORAL EVERY 6 HOURS SCHEDULED
Status: DISCONTINUED | OUTPATIENT
Start: 2020-06-27 | End: 2020-06-28 | Stop reason: HOSPADM

## 2020-06-27 RX ORDER — LISINOPRIL 20 MG/1
20 TABLET ORAL DAILY
Status: DISCONTINUED | OUTPATIENT
Start: 2020-06-27 | End: 2020-06-28 | Stop reason: HOSPADM

## 2020-06-27 RX ORDER — ACETAMINOPHEN 325 MG/1
650 TABLET ORAL EVERY 6 HOURS PRN
Status: DISCONTINUED | OUTPATIENT
Start: 2020-06-27 | End: 2020-06-28 | Stop reason: HOSPADM

## 2020-06-27 RX ADMIN — CEPHALEXIN 500 MG: 500 CAPSULE ORAL at 12:02

## 2020-06-27 RX ADMIN — CEPHALEXIN 500 MG: 500 CAPSULE ORAL at 05:52

## 2020-06-27 RX ADMIN — CEPHALEXIN 500 MG: 500 CAPSULE ORAL at 17:04

## 2020-06-27 RX ADMIN — INSULIN LISPRO 1 UNITS: 100 INJECTION, SOLUTION INTRAVENOUS; SUBCUTANEOUS at 12:35

## 2020-06-27 RX ADMIN — ACETAMINOPHEN 650 MG: 325 TABLET ORAL at 19:49

## 2020-06-27 RX ADMIN — SODIUM CHLORIDE 100 ML/HR: 0.9 INJECTION, SOLUTION INTRAVENOUS at 04:29

## 2020-06-27 RX ADMIN — LISINOPRIL 20 MG: 20 TABLET ORAL at 09:45

## 2020-06-28 VITALS
TEMPERATURE: 97.8 F | RESPIRATION RATE: 18 BRPM | DIASTOLIC BLOOD PRESSURE: 85 MMHG | SYSTOLIC BLOOD PRESSURE: 144 MMHG | HEART RATE: 82 BPM | OXYGEN SATURATION: 98 %

## 2020-06-28 LAB
BACTERIA UR CULT: ABNORMAL
GLUCOSE SERPL-MCNC: 167 MG/DL (ref 65–140)
GLUCOSE SERPL-MCNC: 211 MG/DL (ref 65–140)

## 2020-06-28 PROCEDURE — 82948 REAGENT STRIP/BLOOD GLUCOSE: CPT

## 2020-06-28 PROCEDURE — 99217 PR OBSERVATION CARE DISCHARGE MANAGEMENT: CPT | Performed by: FAMILY MEDICINE

## 2020-06-28 RX ORDER — POTASSIUM CHLORIDE 20 MEQ/1
40 TABLET, EXTENDED RELEASE ORAL ONCE
Status: COMPLETED | OUTPATIENT
Start: 2020-06-28 | End: 2020-06-28

## 2020-06-28 RX ORDER — CEPHALEXIN 500 MG/1
500 CAPSULE ORAL EVERY 6 HOURS SCHEDULED
Qty: 20 CAPSULE | Refills: 0 | Status: SHIPPED | OUTPATIENT
Start: 2020-06-28 | End: 2020-07-08 | Stop reason: ALTCHOICE

## 2020-06-28 RX ADMIN — CEPHALEXIN 500 MG: 500 CAPSULE ORAL at 01:01

## 2020-06-28 RX ADMIN — POTASSIUM CHLORIDE 40 MEQ: 1500 TABLET, EXTENDED RELEASE ORAL at 14:15

## 2020-06-28 RX ADMIN — CEPHALEXIN 500 MG: 500 CAPSULE ORAL at 05:25

## 2020-06-28 RX ADMIN — CEPHALEXIN 500 MG: 500 CAPSULE ORAL at 12:19

## 2020-06-28 RX ADMIN — LISINOPRIL 20 MG: 20 TABLET ORAL at 08:54

## 2020-06-29 ENCOUNTER — TELEPHONE (OUTPATIENT)
Dept: FAMILY MEDICINE CLINIC | Facility: CLINIC | Age: 35
End: 2020-06-29

## 2020-06-29 ENCOUNTER — TRANSITIONAL CARE MANAGEMENT (OUTPATIENT)
Dept: FAMILY MEDICINE CLINIC | Facility: CLINIC | Age: 35
End: 2020-06-29

## 2020-06-29 NOTE — TELEPHONE ENCOUNTER
ANTICOAGULATION MANAGEMENT     Patient Name:  Jazmin Clifton  Date:  2020    ASSESSMENT /SUBJECTIVE:    Today's INR result of 2.9 is therapeutic. Goal INR of 2.0-3.0      Warfarin dose taken: Warfarin taken as previously instructed    Diet: No new diet changes affecting INR    Medication changes/ interactions: Interaction between Keflex and warfarin may be affecting INR, patient finished on 20    Previous INR: Subtherapeutic     S/S of bleeding or thromboembolism: No    New injury or illness: Yes: recent cellulitis    Upcoming surgery, procedure or cardioversion: No    Additional findings: None      PLAN:    Spoke with [facility], regarding INR result and instructed:     Warfarin Dosing Instructions: Continue your current warfarin dose 2 mg on Mon/wed/Fri and 3 mg all other days    Instructed patient to follow up no later than: 1 week  Orders given to  Homecare nurse/facility to recheck    Education provided: None required      bossman Shrestha, verbalizes understanding and agrees to warfarin dosing plan.    Instructed to call the Anticoagulation Clinic for any changes, questions or concerns. (#[phone])        OBJECTIVE:  INR   Date Value Ref Range Status   2020 2.9 (A) 0.90 - 1.10 Final         No question data found.  Anticoagulation Summary  As of 2020    INR goal:   2.0-3.0   TTR:   76.0 % (3.1 mo)   INR used for dosin.9 (2020)   Warfarin maintenance plan:   2 mg (1 mg x 2) every Mon, Wed, Fri; 3 mg (1 mg x 3) all other days   Full warfarin instructions:   2 mg every Mon, Wed, Fri; 3 mg all other days   Weekly warfarin total:   18 mg   Plan last modified:   Michelle Alvarez RN (3/31/2020)   Next INR check:      Priority:   High   Target end date:   3/11/2021    Indications    Paroxysmal atrial fibrillation (H) [I48.0]  Atrial fibrillation  unspecified type (H) [I48.91]  History of DVT (deep vein thrombosis) [Z86.718]  CVA (cerebral vascular accident) (H)  Refill request (Resolved) [I63.9]             Anticoagulation Episode Summary     INR check location:       Preferred lab:       Send INR reminders to:   GUSTAVO FARAH    Comments:   History of CVA 2017 s/p endarterectomy in 2017 Previous clipping of aneurysm. [facility]. Lives at [facility]. Facility lab days are MON/THURS. Fax orders to [phone] Phone: [phone]      Anticoagulation Care Providers     Provider Role Specialty Phone number    Junie Estrella APRN CNP Responsible Nurse Practitioner - Gerontology [phone]

## 2020-07-08 ENCOUNTER — OFFICE VISIT (OUTPATIENT)
Dept: FAMILY MEDICINE CLINIC | Facility: CLINIC | Age: 35
End: 2020-07-08

## 2020-07-08 VITALS
HEIGHT: 63 IN | OXYGEN SATURATION: 99 % | DIASTOLIC BLOOD PRESSURE: 84 MMHG | RESPIRATION RATE: 18 BRPM | BODY MASS INDEX: 33.36 KG/M2 | TEMPERATURE: 97.7 F | SYSTOLIC BLOOD PRESSURE: 140 MMHG | HEART RATE: 99 BPM | WEIGHT: 188.3 LBS

## 2020-07-08 DIAGNOSIS — I10 ESSENTIAL HYPERTENSION: ICD-10-CM

## 2020-07-08 DIAGNOSIS — Z01.00 DIABETIC EYE EXAM (HCC): ICD-10-CM

## 2020-07-08 DIAGNOSIS — E11.9 DIABETIC EYE EXAM (HCC): ICD-10-CM

## 2020-07-08 DIAGNOSIS — N39.0 UTI (URINARY TRACT INFECTION): Primary | ICD-10-CM

## 2020-07-08 PROBLEM — Z01.419 ENCOUNTER FOR ANNUAL ROUTINE GYNECOLOGICAL EXAMINATION: Status: RESOLVED | Noted: 2018-09-14 | Resolved: 2020-07-08

## 2020-07-08 PROBLEM — R10.9 ABDOMINAL PAIN: Status: RESOLVED | Noted: 2020-06-27 | Resolved: 2020-07-08

## 2020-07-08 PROCEDURE — 99495 TRANSJ CARE MGMT MOD F2F 14D: CPT | Performed by: FAMILY MEDICINE

## 2020-07-08 PROCEDURE — 92250 FUNDUS PHOTOGRAPHY W/I&R: CPT | Performed by: FAMILY MEDICINE

## 2020-07-08 RX ORDER — MEDICAL SUPPLY, MISCELLANEOUS
EACH MISCELLANEOUS DAILY
Qty: 1 EACH | Refills: 0 | Status: SHIPPED | OUTPATIENT
Start: 2020-07-08

## 2020-07-08 NOTE — PROGRESS NOTES
Transition of Care  Follow-up After Hospitalization    Luis Rayo 29 y o  female   Date:  7/8/2020    TCM Call (since 6/7/2020)     Date and time call was made  6/29/2020  1:15 PM    Hospital care reviewed  Records reviewed    Patient was hospitialized at  [facility]    Date of Admission  06/26/20    Date of discharge  06/28/20    Diagnosis  UTI     Disposition  Home    Were the patients medications reviewed and updated  No    Current Symptoms  None      TCM Call (since 6/7/2020)     Post hospital issues  None    Should patient be enrolled in anticoag monitoring? No    Scheduled for follow up? Yes    Did you obtain your prescribed medications  Yes    Do you need help managing your prescriptions or medications  No    Is transportation to your appointment needed  No    I have advised the patient to call PCP with any new or worsening symptoms  Carlita Marshall/ [facility] records were reviewed  Medications upon discharge reviewed/updated  Discharge Disposition:  Home with PO antibiotics x 7 days with PCP follow up   Follow up visits with other specialists: None    HPI:  German Deleon is a 29 yr old female with a past medial history of HTN and T2DM  Patient was admitted to the [facility] med/surg floor for abdominal pain secondary to UTI as evident on CT abdomen/pelvis (Circumferential wall thickening of the urinary bladder)  Acute appendicitis was ruled out and she was cleared by surgery  Length of stay was one night and she received IV hydration as well as started on PO Keflex and recommended to continue for 7 days, patient reports completing her antibiotics  Currently patient denies any fever, chills, shortness of breath, chest pain, palpitations, light headedness, headaches, vision change, abdominal pain, N/V/D, urinary symptoms  ROS: Review of Systems   Constitutional: Negative for activity change, appetite change, chills and fever     HENT: Negative for congestion, dental problem, hearing loss, rhinorrhea, sinus pain and sore throat  Eyes: Negative for photophobia, pain and visual disturbance  Respiratory: Negative for cough, chest tightness, shortness of breath and wheezing  Cardiovascular: Negative for chest pain, palpitations and leg swelling  Gastrointestinal: Negative for abdominal pain, blood in stool, constipation, diarrhea, nausea and vomiting  Genitourinary: Negative for difficulty urinating, dysuria, frequency, hematuria and urgency  Musculoskeletal: Negative for arthralgias, back pain, myalgias and neck pain  Skin: Negative for rash  Allergic/Immunologic: Negative  Neurological: Negative for dizziness, weakness, light-headedness, numbness and headaches  Hematological: Negative for adenopathy  Psychiatric/Behavioral: Negative for agitation, behavioral problems, sleep disturbance and suicidal ideas       Past Medical History:   Diagnosis Date    Diabetes mellitus (HCC)     NIDDM    Heart murmur     Heart murmur     Heart palpitations     Hypertension     Seasonal allergies     Ulcer     Urinary tract infection     Wears glasses      Past Surgical History:   Procedure Laterality Date    NO PAST SURGERIES      VT LAP,MYOMECTOMY 5/>,TOTAL WT >250 GMS N/A 4/22/2019    Procedure: Daniela Luz;  Surgeon: Marina Sarmiento MD;  Location: [facility];  Service: Gynecology    UTERINE FIBROID SURGERY       Social History     Socioeconomic History    Marital status: Single     Spouse name: None    Number of children: None    Years of education: None    Highest education level: None   Occupational History    None   Social Needs    Financial resource strain: None    Food insecurity:     Worry: None     Inability: None    Transportation needs:     Medical: None     Non-medical: None   Tobacco Use    Smoking status: Never Smoker    Smokeless tobacco: Never Used   Substance and Sexual Activity    Alcohol use: Never     Frequency: Never    Drug use: No    Sexual activity: Yes     Partners: Male     Birth control/protection: None   Lifestyle    Physical activity:     Days per week: None     Minutes per session: None    Stress: None   Relationships    Social connections:     Talks on phone: None     Gets together: None     Attends [employer] service: None     Active member of club or organization: None     Attends meetings of clubs or organizations: None     Relationship status: None    Intimate partner violence:     Fear of current or ex partner: None     Emotionally abused: None     Physically abused: None     Forced sexual activity: None   Other Topics Concern    None   Social History Narrative    None     Family History   Problem Relation Age of Onset    Diabetes Mother     Hypertension Father     Diabetes Maternal Grandmother     Heart disease Paternal Grandmother     Ulcers Paternal Grandfather     Heart disease Family     Cancer Maternal Aunt     Cancer Paternal Aunt      Allergies   Allergen Reactions    Nuts Anaphylaxis     Almonds- rash    Pollen Extract Sneezing    Strawberry Extract Rash     Mouth and hands       Current Outpatient Medications:     cyanocobalamin (VITAMIN B-12) 500 mcg tablet, Take 500 mcg by mouth every morning , Disp: , Rfl:     glimepiride (AMARYL) 4 mg tablet, Take 1 tablet (4 mg total) by mouth 2 (two) times a day, Disp: 180 tablet, Rfl: 1    lisinopril (ZESTRIL) 20 mg tablet, Take 1 tablet (20 mg total) by mouth daily, Disp: 90 tablet, Rfl: 3    Multiple Vitamin (MULTIVITAMIN) capsule, Take 1 capsule by mouth every morning , Disp: , Rfl:     vitamin A 2250 MCG (7500 UT) capsule, Take 7,500 Units by mouth daily, Disp: , Rfl:     Blood Pressure Monitoring (B-D ASSURE BPM/AUTO ARM CUFF) MISC, by Does not apply route daily, Disp: 1 each, Rfl: 0    ibuprofen (MOTRIN) 600 mg tablet, Take 1 tablet (600 mg total) by mouth every 6 (six) hours as needed for mild pain (cramping) (Patient not taking: Reported on 6/27/2020), Disp: 30 tablet, Rfl: 0    misoprostol (CYTOTEC) 200 mcg tablet, Take 1 tablet (200 mcg total) by mouth once for 1 dose Take 200mg night before appointment  Place intravaginally followed by a tampon  , Disp: 1 tablet, Rfl: 0    sitaGLIPtin (JANUVIA) 100 mg tablet, Take 1 tablet (100 mg total) by mouth every morning (Patient not taking: Reported on 6/27/2020), Disp: 90 tablet, Rfl: 0    sitaGLIPtin (JANUVIA) 100 mg tablet, Take 1 tablet (100 mg total) by mouth daily for 14 days, Disp: 14 tablet, Rfl: 0    Physical Exam:  /84 (BP Location: Left arm, Patient Position: Sitting, Cuff Size: Standard)   Pulse 99   Temp 97 7 °F (36 5 °C) (Temporal)   Resp 18   Ht 5' 2 5" (1 588 m)   Wt 85 4 kg (188 lb 4 8 oz)   LMP 07/04/2020   SpO2 99%   BMI 33 89 kg/m²     Physical Exam   Constitutional: She is oriented to person, place, and time  She appears well-developed and well-nourished  No distress  HENT:   Head: Normocephalic and atraumatic  Eyes: Pupils are equal, round, and reactive to light  EOM are normal    Neck: Normal range of motion  Neck supple  Cardiovascular: Normal rate, regular rhythm, normal heart sounds and intact distal pulses  No murmur (non appreciated however patient reported being told she has a murmur in the past) heard  Pulmonary/Chest: Effort normal and breath sounds normal  No respiratory distress  She has no wheezes  She exhibits no tenderness  Abdominal: Soft  Bowel sounds are normal  She exhibits no distension  There is no tenderness  Musculoskeletal: Normal range of motion  She exhibits no edema or tenderness  Neurological: She is alert and oriented to person, place, and time  Skin: Skin is warm and dry  Capillary refill takes less than 2 seconds  No rash noted  Psychiatric: She has a normal mood and affect  Her behavior is normal    Nursing note and vitals reviewed      Labs:  Lab Results   Component Value Date    WBC 8 54 06/27/2020 HGB 10 4 (L) 06/27/2020    HCT 32 5 (L) 06/27/2020    MCV 92 06/27/2020     06/27/2020     Lab Results   Component Value Date    K 3 3 (L) 06/27/2020     06/27/2020    CO2 23 06/27/2020    BUN 15 06/27/2020    CREATININE 0 92 06/27/2020    GLUF 113 (H) 06/27/2020    CALCIUM 8 6 06/27/2020    AST 25 06/26/2020    ALT 18 06/26/2020    ALKPHOS 56 06/26/2020    EGFR 94 06/27/2020     Assessment and Plan:    Otoniel Randle was seen today for follow-up  Diagnoses and all orders for this visit:    UTI (urinary tract infection)    Essential hypertension  -     Blood Pressure Monitoring (B-D ASSURE BPM/AUTO ARM CUFF) MISC; by Does not apply route daily    Diabetic eye exam ([facility] [address] )  -     IRIS Diabetic eye exam    UTI (urinary tract infection)  Discharged from hospital on 6/27/2020 after one day admission with a 7 day course of Keflex  Patient completed antibiotics  No longer has any symptoms and infection resolved  Will schedule annual physical within the next month       Angelica Nageotte, MD  07/08/20  9:42 PM

## 2020-07-09 DIAGNOSIS — H35.00 RETINOPATHY: Primary | ICD-10-CM

## 2020-07-09 LAB
LEFT EYE DIABETIC RETINOPATHY: ABNORMAL
LEFT EYE IMAGE QUALITY: ABNORMAL
LEFT EYE MACULAR EDEMA: ABNORMAL
LEFT EYE OTHER RETINOPATHY: ABNORMAL
RIGHT EYE DIABETIC RETINOPATHY: ABNORMAL
RIGHT EYE IMAGE QUALITY: ABNORMAL
RIGHT EYE MACULAR EDEMA: ABNORMAL
RIGHT EYE OTHER RETINOPATHY: ABNORMAL
SEVERITY (EYE EXAM): ABNORMAL

## 2020-07-10 ENCOUNTER — TELEPHONE (OUTPATIENT)
Dept: FAMILY MEDICINE CLINIC | Facility: CLINIC | Age: 35
End: 2020-07-10

## 2020-07-10 NOTE — TELEPHONE ENCOUNTER
Pt called just now stating that someone called her about meds  Not sure who could of called  Did not see any notations

## 2020-07-22 ENCOUNTER — OFFICE VISIT (OUTPATIENT)
Dept: FAMILY MEDICINE CLINIC | Facility: CLINIC | Age: 35
End: 2020-07-22

## 2020-07-22 VITALS
BODY MASS INDEX: 33.84 KG/M2 | DIASTOLIC BLOOD PRESSURE: 70 MMHG | HEIGHT: 63 IN | WEIGHT: 191 LBS | SYSTOLIC BLOOD PRESSURE: 140 MMHG | HEART RATE: 105 BPM | OXYGEN SATURATION: 98 % | TEMPERATURE: 98.5 F | RESPIRATION RATE: 16 BRPM

## 2020-07-22 DIAGNOSIS — D64.9 ANEMIA, UNSPECIFIED TYPE: ICD-10-CM

## 2020-07-22 DIAGNOSIS — Z00.00 ANNUAL PHYSICAL EXAM: Primary | ICD-10-CM

## 2020-07-22 DIAGNOSIS — Z11.4 ENCOUNTER FOR SCREENING FOR HIV: ICD-10-CM

## 2020-07-22 DIAGNOSIS — Z23 ENCOUNTER FOR IMMUNIZATION: ICD-10-CM

## 2020-07-22 DIAGNOSIS — E11.9 TYPE 2 DIABETES MELLITUS WITHOUT COMPLICATION, WITHOUT LONG-TERM CURRENT USE OF INSULIN (HCC): ICD-10-CM

## 2020-07-22 DIAGNOSIS — R45.86 MOOD CHANGE: ICD-10-CM

## 2020-07-22 DIAGNOSIS — Z13.9 ENCOUNTER FOR HEALTH-RELATED SCREENING: ICD-10-CM

## 2020-07-22 PROBLEM — N39.0 UTI (URINARY TRACT INFECTION): Status: RESOLVED | Noted: 2020-06-27 | Resolved: 2020-07-22

## 2020-07-22 PROCEDURE — 99395 PREV VISIT EST AGE 18-39: CPT | Performed by: FAMILY MEDICINE

## 2020-07-22 PROCEDURE — 90471 IMMUNIZATION ADMIN: CPT

## 2020-07-22 PROCEDURE — 90715 TDAP VACCINE 7 YRS/> IM: CPT

## 2020-07-22 PROCEDURE — 3052F HG A1C>EQUAL 8.0%<EQUAL 9.0%: CPT | Performed by: FAMILY MEDICINE

## 2020-07-22 NOTE — PROGRESS NOTES
[address] [place] [address]  Annual Well Adult Visit      Assessment/Plan     1  Healthy female exam   2  Patient Counseling:   · Nutrition: Stressed importance of a well balanced diet, moderation of sodium/saturated fat, caloric balance and sufficient intake of fiber  · Exercise: Stressed the importance of regular exercise with a goal of 150 minutes per week  · Dental Health: Discussed daily flossing and brushing and regular dental visits   · Sexuality: Discussed sexually transmitted infections, use of condoms and prevention of unintended pregnancy  · Alcohol Use:  Recommended moderation of alcohol intake  · Injury Prevention: Discussed Safety Belts, Safety Helmets, and Smoke Detectors  · Immunizations reviewed  Tetanus needed  · Discussed benefits of screening Pap smear - history of HPV positive  · Discussed the patient's BMI with her  The BMI is above average; BMI management plan is completed  3  Cancer Screening: PAP is not required right now  However patient has a history of HPV positive in 2018 with a negative cytology  Encouraged her to follow up with her OB/GYN  4  Labs: CBC & iron panel for chronic anemia work up  Denies any hematuria, blood in stool, abnormal uterine bleeding  States when she was younger she was told she had iron deficiency anemia but never took any medication, instead ate liver and chicken  Will also get HIV screening blood test  Patient agreed  5  Diabetes Mellitus: Great improvement in HbA1C from March to June  However at the beginning of June patient lost insurance  We are attempting to get Januvia approved and will continue Amaryl  6  Positive Iris test: suggestive of diabetic retinopathy and glaucoma  Patient has an ophthalmologist as she wears glasses, however also provided information for Dr Josué Medina office  Urged patient to make an appointment with either ASAP  7  Reports a change in her mood but denies any depression or anxiety   Has never taken any medication for mental health however did attend therapy before  She would like assistance in finding a new therapist  Referral to case management sent  8  HTN: well controlled and at goal (<140/90) on Lisinopril 20 mg    9  Follow up in 2 month  for diabetic foot exam and follow up diabetes mellitus  Bri Clarke is a 28 y o   female and is here for routine health maintenance  The patient reports no problems  History of Present Illness     Well Adult Physical   Patient here for a comprehensive physical exam     Diet and Physical Activity  Diet: well balanced diet and has switched to an asian/meditteranean diet   Weight concerns: Patient has class 1 obesity (BMI 30-34  9)  Exercise: intermittently, does like to go for walks however during the pandemic she is nervous as many people in her neighborhood do not wear masks  Encouraged her to find figueroa that are not as busy for her to feel comfortable and to practice safe social distances  Depression Screen  PHQ-9 Depression Screening    PHQ-9:    Frequency of the following problems over the past two weeks:       Little interest or pleasure in doing things:  1 - several days  Feeling down, depressed, or hopeless:  1 - several days  PHQ-2 Score:  2       General Health  Hearing: Normal:  bilateral  Vision: wears glasses  Dental: regular dental visits     History:  LMP: Patient's last menstrual period was 2020  Regular menses with normal    : 3  Para: 0  3 miscairrages    Cancer Screening  Pap: Last pap was 2018 with no cytology abnormalities however was positive for HPV  Abnormal pap? yes, Encouraged patient to call her OB/GYN office to schedule a PAP smear  Otherwise we can do it in our office       The following portions of the patient's history were reviewed and updated as appropriate: allergies, current medications, past family history, past medical history, past social history, past surgical history and problem list     Review of Systems     Review of Systems   Constitutional: Negative for activity change, appetite change, chills and fever  HENT: Negative for congestion, dental problem, hearing loss, rhinorrhea, sinus pain and sore throat  Eyes: Negative for photophobia, pain and visual disturbance  Respiratory: Negative for cough, chest tightness, shortness of breath and wheezing  Cardiovascular: Negative for chest pain, palpitations and leg swelling  Gastrointestinal: Negative for abdominal pain, blood in stool, constipation, diarrhea, nausea and vomiting  Genitourinary: Negative for difficulty urinating, dysuria, frequency, hematuria and urgency  Musculoskeletal: Negative for arthralgias, back pain, myalgias and neck pain  Skin: Negative for rash  Allergic/Immunologic: Negative  Neurological: Negative for dizziness, weakness, light-headedness, numbness and headaches  Hematological: Negative for adenopathy  Psychiatric/Behavioral: Negative for agitation, behavioral problems, sleep disturbance and suicidal ideas       Past Medical History     Past Medical History:   Diagnosis Date    Diabetes mellitus (HCC)     NIDDM    Heart murmur     Heart murmur     Heart palpitations     Hypertension     Seasonal allergies     Ulcer     Urinary tract infection     Wears glasses      Past Surgical History     Past Surgical History:   Procedure Laterality Date    NO PAST SURGERIES      IN LAP,MYOMECTOMY 5/>,TOTAL WT >250 GMS N/A 4/22/2019    Procedure: MYOMECTOMY LAPAROSCOPIC;  Surgeon: Cherie Oconnor MD;  Location: [facility];  Service: Gynecology    UTERINE FIBROID SURGERY       Social History     Social History     Socioeconomic History    Marital status: Single     Spouse name: None    Number of children: None    Years of education: None    Highest education level: None   Occupational History    None   Social Needs    Financial resource strain: None    Food insecurity:     Worry: None     Inability: None    Transportation needs:     Medical: None     Non-medical: None   Tobacco Use    Smoking status: Never Smoker    Smokeless tobacco: Never Used   Substance and Sexual Activity    Alcohol use: Never     Frequency: Never    Drug use: No    Sexual activity: Yes     Partners: Male     Birth control/protection: None   Lifestyle    Physical activity:     Days per week: None     Minutes per session: None    Stress: None   Relationships    Social connections:     Talks on phone: None     Gets together: None     Attends Sabianism service: None     Active member of club or organization: None     Attends meetings of clubs or organizations: None     Relationship status: None    Intimate partner violence:     Fear of current or ex partner: None     Emotionally abused: None     Physically abused: None     Forced sexual activity: None   Other Topics Concern    None   Social History Narrative    Living with parents          Family History     Family History   Problem Relation Age of Onset    Diabetes Mother     Hypertension Father     Diabetes Maternal Grandmother     Heart disease Paternal Grandmother     Ulcers Paternal Grandfather     Heart disease Family     Cancer Maternal Aunt     Cancer Paternal Aunt      Current Medications       Current Outpatient Medications:     Blood Pressure Monitoring (B-D ASSURE BPM/AUTO ARM CUFF) MISC, by Does not apply route daily, Disp: 1 each, Rfl: 0    cyanocobalamin (VITAMIN B-12) 500 mcg tablet, Take 500 mcg by mouth every morning , Disp: , Rfl:     glimepiride (AMARYL) 4 mg tablet, Take 1 tablet (4 mg total) by mouth 2 (two) times a day, Disp: 180 tablet, Rfl: 1    lisinopril (ZESTRIL) 20 mg tablet, Take 1 tablet (20 mg total) by mouth daily, Disp: 90 tablet, Rfl: 3    Multiple Vitamin (MULTIVITAMIN) capsule, Take 1 capsule by mouth every morning , Disp: , Rfl:     vitamin A 2250 MCG (7500 UT) capsule, Take 7,500 Units by mouth daily, Disp: , Rfl:     ibuprofen (MOTRIN) 600 mg tablet, Take 1 tablet (600 mg total) by mouth every 6 (six) hours as needed for mild pain (cramping) (Patient not taking: Reported on 6/27/2020), Disp: 30 tablet, Rfl: 0    sitaGLIPtin (JANUVIA) 100 mg tablet, Take 1 tablet (100 mg total) by mouth every morning, Disp: 90 tablet, Rfl: 0     Allergies     Allergies   Allergen Reactions    Nuts Anaphylaxis     Almonds- rash    Pollen Extract Sneezing    Strawberry Extract Rash     Mouth and hands     Objective     /70 (BP Location: Right arm, Patient Position: Sitting, Cuff Size: Large)   Pulse 105   Temp 98 5 °F (36 9 °C) (Temporal)   Resp 16   Ht 5' 2 5" (1 588 m)   Wt 86 6 kg (191 lb)   LMP 07/04/2020   SpO2 98%   Breastfeeding No   BMI 34 38 kg/m²      Physical Exam   Constitutional: She is oriented to person, place, and time  She appears well-developed and well-nourished  HENT:   Head: Normocephalic and atraumatic  Eyes: Pupils are equal, round, and reactive to light  EOM are normal  Right eye exhibits no discharge  Left eye exhibits no discharge  Neck: Normal range of motion  Neck supple  Cardiovascular: Normal rate, regular rhythm, normal heart sounds and intact distal pulses  No murmur (non appreciated however patient reported being told she has a murmur in the past) heard  Pulmonary/Chest: Effort normal and breath sounds normal  No respiratory distress  She has no wheezes  Abdominal: Soft  Bowel sounds are normal  She exhibits no distension  There is no tenderness  Musculoskeletal: Normal range of motion  She exhibits no edema or tenderness  Neurological: She is alert and oriented to person, place, and time  Skin: Skin is warm and dry  Psychiatric: She has a normal mood and affect  Her behavior is normal    Nursing note and vitals reviewed      Health Maintenance     Health Maintenance   Topic Date Due    DTaP,Tdap,and Td Vaccines (1 - Tdap) 07/18/1996    HIV Screening  07/18/2000    Annual Physical  07/18/2003    BMI: Followup Plan  05/23/2020    Influenza Vaccine  07/01/2020    HEMOGLOBIN A1C  12/27/2020    Diabetic Foot Exam  03/17/2021    DM Eye Exam  07/08/2021    Depression Screening PHQ  07/22/2021    BMI: Adult  07/22/2021    Cervical Cancer Screening  09/14/2021    Pneumococcal Vaccine: 65+ Years (1 of 2 - PCV13) 07/18/2050    Pneumococcal Vaccine: Pediatrics (0 to 5 Years) and At-Risk Patients (6 to 59 Years)  Completed    HIB Vaccine  Aged Out    Hepatitis B Vaccine  Aged Out    IPV Vaccine  Aged Out    Hepatitis A Vaccine  Aged Out    Meningococcal ACWY Vaccine  Aged Out    HPV Vaccine  Aged [employer] History   Administered Date(s) Administered    Hep B, adult 11/20/2018, 12/30/2018, 05/22/2019    Influenza, injectable, quadrivalent, preservative free 0 5 mL 09/19/2019    Influenza, recombinant, quadrivalent,injectable, preservative free 11/20/2018    Pneumococcal Polysaccharide PPV23 11/20/2018    Rabies 02/28/2016    Tdap 07/22/2020     Geena Dow MD  07/22/20  7:39 PM

## 2020-07-22 NOTE — PATIENT INSTRUCTIONS
Call eye doctor and make an appointment - If not you can go to Dr Barros's across from our office  Schedule a dental appointment at the clinic on the second floor of the office   Get blood work completed and we will let you know about the results      Glaucoma   WHAT YOU NEED TO KNOW:   What is glaucoma? Glaucoma is an eye disease that causes vision loss in one or both eyes  Glaucoma is caused by fluid buildup behind the eye  This puts pressure on your optic nerve and damages it  Glaucoma usually develops slowly  What increases my risk for glaucoma? · Farsightedness    · Age older than 36    · Female gender    · Family history of glaucoma    · Medicines, such as antidepressants, antihistamines, or anticholinergics    · Medical conditions, such as diabetes or high blood pressure    · Severe eye injury  What are the signs and symptoms of glaucoma? · Loss of peripheral vision    · Blurry vision    · Poor night vision    · Blind spots    · Trouble focusing on objects that are close to your eyes    · Halos or rainbows around lights at night  How is glaucoma diagnosed? Your healthcare provider will ask about your symptoms and examine your eyes  He will check your peripheral vision  He may check how well your eyes drain fluid  You may need any of the following:  · A tonometry  test is used to measure your eye pressure  Your eyes are numbed with eyedrops and your healthcare provider touches your eyes with an instrument  A puff of air may instead be blown into your eyes and pressure is measured with a light  · An ophthalmoscope  will be used to check for optic nerve damage  Your healthcare provider will turn off the lights in the room and shine a bright light in your eyes  You may need eyedrops to dilate your pupils  This gives your healthcare provider a better view of the inside of your eye  How is glaucoma treated? The goal of treatment is to reduce eye pressure and prevent damage to your optic nerve   You may need any of the following:  · Eye pressure medicines  help decrease eye pressure  They may also decrease the amount of fluid your eyes make or help your eyes drain better  · Laser surgery  may be needed if other treatments do not work  Healthcare providers use a laser to open your eye drainage system or create a new opening for eye fluid to drain  How can I help prevent more eye damage? · Get regular eye exams  This will help healthcare providers monitor your glaucoma  · Avoid behaviors that increase eye pressure  Try not to strain when you have a bowel movement  Do not wear tight clothing around your neck or chest  Do not push or lift anything heavier than 5 pounds  Avoid strenuous activity  · Avoid people who are sick  Eye pressure increases when you sneeze or cough  When should I contact my healthcare provider? · Your symptoms get worse, even after treatment  · Your eye medicine causes your eyes to sting or turn red  · You have questions or concerns about your condition or care  When should I seek immediate care or call 911? · You have a sudden loss of vision  · You have blurry vision and a severe headache  · You have severe eye pain or a change in your vision  · You have nausea and are vomiting  CARE AGREEMENT:   You have the right to help plan your care  Learn about your health condition and how it may be treated  Discuss treatment options with your caregivers to decide what care you want to receive  You always have the right to refuse treatment  The above information is an  only  It is not intended as medical advice for individual conditions or treatments  Talk to your doctor, nurse or pharmacist before following any medical regimen to see if it is safe and effective for you  © 2017 [employer] Information is for End User's use only and may not be sold, redistributed or otherwise used for commercial purposes   All illustrations and images included in [facility] are the copyrighted property of [employer]  or Suhail Archibald  Diabetic Retinopathy   AMBULATORY CARE:   Diabetic retinopathy (DR)  is eye damage caused by long-term high blood sugar levels  The walls of the blood vessels in the retina weaken and leak blood  This causes swelling and vision problems  Over time, new, weak blood vessels grow, leak blood, and cover the center of the retina  DR can lead to blindness  Common symptoms include the following:   · Blurred vision    · Seeing red or black wavy lines that look like a curtain or spider web    · Seeing light flashes or red, black, or grey floating spots (floaters)    · Vision loss  Call 911 for any of the following:   · You suddenly cannot see  Contact your healthcare provider if:   · Your blurred vision gets worse, or you start to see double  · You see more floating spots  · You see dark spots  · You have questions or concerns about your condition or care  Treatment  may not be needed if you have mild DR  Healthcare providers will check your eyes regularly to monitor the damage to your retinas  If you have severe DR, you may need surgery to slow or stop the disease  Laser treatment may slow DR and prevent blindness  This treatment shrinks the new blood vessels and seals the areas that have leaks  Surgery called a vitrectomy may be done if there is bleeding in the vitreous that does not clear  The vitreous is the gel-like material that fills the inside of the eye  Prevent DR:   · Control your blood sugar  Keep your blood sugar levels as close to normal as possible  You may need to check your blood sugar levels 3 times each day  · Get your eyes checked at least once each year  Your eye doctor may want to see you every 6 months or more often  If you are pregnant, get your eyes checked during the first 13 weeks of your pregnancy   You will need frequent eye exams during pregnancy and for 1 year after you give birth  · Manage your blood pressure and cholesterol  Your blood pressure should be 140/90 mmHg or lower  You may need lab tests that measure the amount of cholesterol in your blood  You may need to make lifestyle changes and take medicines to control your blood pressure and cholesterol  · Exercise regularly  Ask your healthcare provider about the best exercise plan for you  He will tell you how to control your blood sugar when you exercise  You may need to check your blood sugar more often during exercise  Bring a snack with you when you exercise in case your blood sugar gets too low  · Do not smoke  Nicotine can damage blood vessels in your eyes and make it more difficult to manage your diabetes  Do not use e-cigarettes or smokeless tobacco in place of cigarettes or to help you quit  They still contain nicotine  Ask your healthcare provider for information if you currently smoke and need help quitting  Follow up with your healthcare provider or eye specialist as directed:  Write down your questions so you remember to ask them during your visits  © 2017 [facility] Information is for End User's use only and may not be sold, redistributed or otherwise used for commercial purposes  All illustrations and images included in CareNotes® are the copyrighted property of [employer]  or Suhail Archibald  The above information is an  only  It is not intended as medical advice for individual conditions or treatments  Talk to your doctor, nurse or pharmacist before following any medical regimen to see if it is safe and effective for you

## 2020-07-24 ENCOUNTER — PATIENT OUTREACH (OUTPATIENT)
Dept: FAMILY MEDICINE CLINIC | Facility: CLINIC | Age: 35
End: 2020-07-24

## 2020-07-24 NOTE — PROGRESS NOTES
ANGEL BUENO received a referral from patient's PCP regarding patient's interest in establishing mental health services  ANGEL BUENO contacted patient to assess  Patient expressed interest in obtaining information on various mental health providers in the area  Patient agreeable to receiving a list via email: email has been sent  Patient also inquired about her perscriber ID number, as she denies having a physical copy of her new insurance card  Insurance information would be needed to schedule mental health appointment  ANGEL BUENO provided patient with her prescriber ID number as listed in the chart  Patient denies having additional questions or concerns  Referral will be closed at this time  ANGEL BUENO will remain available for additional assistance/support as needed  assistance/support as needed

## 2020-09-30 ENCOUNTER — OFFICE VISIT (OUTPATIENT)
Dept: FAMILY MEDICINE CLINIC | Facility: CLINIC | Age: 35
End: 2020-09-30

## 2020-09-30 VITALS
OXYGEN SATURATION: 98 % | DIASTOLIC BLOOD PRESSURE: 80 MMHG | HEART RATE: 100 BPM | BODY MASS INDEX: 33.49 KG/M2 | HEIGHT: 63 IN | RESPIRATION RATE: 16 BRPM | WEIGHT: 189 LBS | TEMPERATURE: 98.3 F | SYSTOLIC BLOOD PRESSURE: 160 MMHG

## 2020-09-30 DIAGNOSIS — Z23 ENCOUNTER FOR IMMUNIZATION: ICD-10-CM

## 2020-09-30 DIAGNOSIS — I10 ESSENTIAL HYPERTENSION: ICD-10-CM

## 2020-09-30 DIAGNOSIS — E11.9 TYPE 2 DIABETES MELLITUS WITHOUT COMPLICATION, WITHOUT LONG-TERM CURRENT USE OF INSULIN (HCC): Primary | ICD-10-CM

## 2020-09-30 LAB — SL AMB POCT HEMOGLOBIN AIC: 8.4 (ref ?–6.5)

## 2020-09-30 PROCEDURE — 99213 OFFICE O/P EST LOW 20 MIN: CPT | Performed by: FAMILY MEDICINE

## 2020-09-30 PROCEDURE — 90471 IMMUNIZATION ADMIN: CPT

## 2020-09-30 PROCEDURE — 83036 HEMOGLOBIN GLYCOSYLATED A1C: CPT | Performed by: FAMILY MEDICINE

## 2020-09-30 PROCEDURE — 90682 RIV4 VACC RECOMBINANT DNA IM: CPT

## 2020-09-30 NOTE — PATIENT INSTRUCTIONS
[place] and [place], if not there call office to refill   Then we will see each other in 4 weeks   If you have too many low sugar episodes though, please call and make appointment sooner  In the meantime go down to 2 mg twice a day for Glimepiride instead of 4 mg twice a day  Your eye test (IRIS test) was concerning for diabetic retinopathy and glaucoma - make appointment with eye doctor ! Nutrition will reach out to you  Glaucoma   WHAT YOU NEED TO KNOW:   What is glaucoma? Glaucoma is an eye disease that causes vision loss in one or both eyes  Glaucoma is caused by fluid buildup behind the eye  This puts pressure on your optic nerve and damages it  Glaucoma usually develops slowly  What increases my risk for glaucoma? · Farsightedness    · Age older than 36    · Female gender    · Family history of glaucoma    · Medicines, such as antidepressants, antihistamines, or anticholinergics    · Medical conditions, such as diabetes or high blood pressure    · Severe eye injury  What are the signs and symptoms of glaucoma? · Loss of peripheral vision    · Blurry vision    · Poor night vision    · Blind spots    · Trouble focusing on objects that are close to your eyes    · Halos or rainbows around lights at night  How is glaucoma diagnosed? Your healthcare provider will ask about your symptoms and examine your eyes  He will check your peripheral vision  He may check how well your eyes drain fluid  You may need any of the following:  · A tonometry  test is used to measure your eye pressure  Your eyes are numbed with eyedrops and your healthcare provider touches your eyes with an instrument  A puff of air may instead be blown into your eyes and pressure is measured with a light  · An ophthalmoscope  will be used to check for optic nerve damage  Your healthcare provider will turn off the lights in the room and shine a bright light in your eyes  You may need eyedrops to dilate your pupils   This gives your healthcare provider a better view of the inside of your eye  How is glaucoma treated? The goal of treatment is to reduce eye pressure and prevent damage to your optic nerve  You may need any of the following:  · Eye pressure medicines  help decrease eye pressure  They may also decrease the amount of fluid your eyes make or help your eyes drain better  · Laser surgery  may be needed if other treatments do not work  Healthcare providers use a laser to open your eye drainage system or create a new opening for eye fluid to drain  How can I help prevent more eye damage? · Get regular eye exams  This will help healthcare providers monitor your glaucoma  · Avoid behaviors that increase eye pressure  Try not to strain when you have a bowel movement  Do not wear tight clothing around your neck or chest  Do not push or lift anything heavier than 5 pounds  Avoid strenuous activity  · Avoid people who are sick  Eye pressure increases when you sneeze or cough  When should I contact my healthcare provider? · Your symptoms get worse, even after treatment  · Your eye medicine causes your eyes to sting or turn red  · You have questions or concerns about your condition or care  When should I seek immediate care or call 911? · You have a sudden loss of vision  · You have blurry vision and a severe headache  · You have severe eye pain or a change in your vision  · You have nausea and are vomiting  CARE AGREEMENT:   You have the right to help plan your care  Learn about your health condition and how it may be treated  Discuss treatment options with your caregivers to decide what care you want to receive  You always have the right to refuse treatment  The above information is an  only  It is not intended as medical advice for individual conditions or treatments   Talk to your doctor, nurse or pharmacist before following any medical regimen to see if it is safe and effective for you   © 2017 [facility] Information is for End User's use only and may not be sold, redistributed or otherwise used for commercial purposes  All illustrations and images included in CareNotes® are the copyrighted property of A D A M , Inc  or Suhail Archibald  Diabetic Retinopathy   WHAT YOU NEED TO KNOW:   What is diabetic retinopathy? Diabetic retinopathy (DR) is eye damage caused by long-term high blood sugar levels  The walls of the blood vessels in the retina weaken and leak blood  This causes swelling and vision problems  Over time, new, weak blood vessels grow, leak blood, and cover the center of the retina  DR can lead to blindness  What increases my risk for DR? · Having diabetes for more than 10 years    · Poor blood sugar control    · High blood pressure, high cholesterol, pregnancy, or kidney disease    · Smoking    · Eye surgery or other eye problems    · Family members with DR  What are the signs and symptoms of DR? · Blurred vision    · Seeing red or black wavy lines that look like a curtain or spider web    · Seeing light flashes or red, black, or grey floating spots (floaters)    · Vision loss  How is DR diagnosed? Your healthcare provider will examine your eyes  You may also need any of the following:  · Dilated indirect ophthalmoscopy  may show early damage in your retina  A magnifying lens is used to see your retina and other parts of your eye  Eye drops are placed into your eyes to make your pupils larger  · Fluorescein angiography  uses a dye to show blood leakage and damage in your eye  · Fundus photography  may show your DR and how severe it is  How is DR treated? You may not need treatment if you have mild DR  Healthcare providers will check your eyes regularly to monitor the damage to your retinas  If you have severe DR, you may need surgery to slow or stop the disease  Laser treatment may slow DR and prevent blindness   This treatment shrinks the new blood vessels and seals the areas that have leaks  Surgery called a vitrectomy may be done if there is bleeding in the vitreous that does not clear  The vitreous is the gel-like material that fills the inside of the eye  What can I do to prevent DR? · Control your blood sugar  Keep your blood sugar levels as close to normal as possible  You may need to check your blood sugar levels 3 times each day  · Get your eyes checked at least once each year  Your eye doctor may want to see you every 6 months or more often  If you are pregnant, get your eyes checked during the first 13 weeks of your pregnancy  You will need frequent eye exams during pregnancy and for 1 year after you give birth  · Manage your blood pressure and cholesterol  Your blood pressure should be 140/90 mmHg or lower  You may need lab tests that measure the amount of cholesterol in your blood  You may need to make lifestyle changes and take medicines to control your blood pressure and cholesterol  · Exercise regularly  Ask your healthcare provider about the best exercise plan for you  He will tell you how to control your blood sugar when you exercise  You may need to check your blood sugar more often during exercise  Bring a snack with you when you exercise in case your blood sugar gets too low  · Do not smoke  Nicotine can damage blood vessels in your eyes and make it more difficult to manage your diabetes  Do not use e-cigarettes or smokeless tobacco in place of cigarettes or to help you quit  They still contain nicotine  Ask your healthcare provider for information if you currently smoke and need help quitting  Call 911 for any of the following:   · You suddenly cannot see  When should I contact my healthcare provider? · Your blurred vision gets worse, or you start to see double  · You see more floating spots  · You see dark spots  · You have questions or concerns about your condition or care    CARE AGREEMENT:   You have the right to help plan your care  Learn about your health condition and how it may be treated  Discuss treatment options with your caregivers to decide what care you want to receive  You always have the right to refuse treatment  The above information is an  only  It is not intended as medical advice for individual conditions or treatments  Talk to your doctor, nurse or pharmacist before following any medical regimen to see if it is safe and effective for you  © 2017 [address] [employer]  Information is for End User's use only and may not be sold, redistributed or otherwise used for commercial purposes  All illustrations and images included in CareNotes® are the copyrighted property of A D A M , Inc  or [employer]  Lifestyle Medicine Tip Sheet    1  Eat predominantly less processed foods such as fast food, T V  dinners, and espinosa  2  Eat Close to [place], [employer] or [employer]    3  Eat a predominantly plant based diet   a  Dark Leafy Greens  b  Fruits/Vegetables  c  Whole Grains: Whole wheat, barely, wheat berries, quinoa, steel cut oats, brown rice, whole wheat pasta  d  Legumes: kidney beans, palm beans, white beans, black beans, garbanzo beans (chickpeas), lima beans (mature, dried), split peas, lentils, and edamame (green soybeans)      4  At least half of the plate should contain fruits or vegetables        5  Liquid should be predominantly water  (limit soda and juice)    6  Watch portion size  7  Foods you should avoid or limit?  - Fats - Specifically saturated and trans-fats  They are found in margarines, many fast foods, and some store-bought baked goods   Saturated and Trans-fats can raise your cholesterol level and your chance of getting heart disease    - When you cook, it's best to use no oils but if needed try to limit the amount of oil used as oil contains many calories per volume and is very unhealthy when heated during cooking    - Sugar -Limit or avoid sugar, sweets, and refined grains  Refined grains are found in white bread, white rice, most forms of pasta, and most packaged "snack" foods    - Try not to cook with salt and avoid  adding extra salt to your  meals   - Meat - Studies have shown that eating a lot of red meat and poultry can increase your risk of certain health problems, including heart disease, diabetes, obesity and cancer  So try to limit the intake of it  8  Practice good sleep hygiene by getting 7-9 hours of sleep a night    9  Daily exercise minimum of 30 minutes (walking around the block)    10  Socialization (friends and family)   - Explore your neighborhood  Go to the park, spend time at [employer]  - Consider taking a class or volunteering to connect with new people    If you are interested you can read more about healthy food choices at the following websites:  a  Nutritionfacts  org  b  Home cooking recipes: https://www [employer]/  c  http://[employer] info/  d  Familydoctor  org

## 2020-09-30 NOTE — PROGRESS NOTES
Assessment/Plan:    Type 2 diabetes mellitus without complication, without long-term current use of insulin (HCC)    Lab Results   Component Value Date    HGBA1C 8 4 (A) 09/30/2020     Patients last HBA1C was 8 3 - slightly worse  Goal is around 7   Home glucose is 's fasting & 140's after breakfast   Reports trying to cut sugar out of diet   Hypoglycemia symptoms of pre-syncope with throbbing headache - fixes with a gummy bear or honey - happens 1-2 times a month     - Will repeat HbA1C in 3 months  - Will continue with current medications of Amyrl 4 mg BID, as well as restart Januvia 100 mg daily    - Needs to make eye appointment for positive IRIS test at last appointment  - Patient educated on the importance of proper dieting and exercise , and nutrition referral placed  - If patient has too many hypoglycemic episodes she is instructed to call and make appointment sooner than our expected follow up in 4 weeks  Additionally, in the meantime will go down to 2 mg twice a day for Glimepiride instead of 4 mg twice a day        Essential hypertension  Patient's blood pressure is not at goal today  Goal is < 140/90  However repeat BP was 140/80 towards the end of the appointment  Patient denies any side effects with medications  Patient educated on the importance of weight loss, and appropriate dieting  Patient admits to be compliant with medications  Missed lisniopril 20 mg last night  Doesn't miss it often unless she is too tired  Will continue 20 mg lisinopril daily      Diagnoses and all orders for this visit:    Type 2 diabetes mellitus without complication, without long-term current use of insulin ([facility])  -     POCT hemoglobin A1c  -     Ambulatory referral to Nutrition Services; Future    Encounter for immunization  -    Administered - influenza vaccine, quadrivalent, recombinant, PF, 0 5 mL, for patients 18 yr+ (FLUBLOK)          Subjective:      Patient ID: Dinorah Saucedo is a 28 y o  female  This is a very pleasant 28 y o  female who presents to the clinic for management of their chronic medical conditions  Patient's medical conditions are stable unless noted otherwise above  Patient has not had any recent hospitalizations, or medical emergencies since last visit  She has a history of recurrent UTI's and was recently treated with Macrobid  Patient denies any urinary symptoms  She is not sexually active and menses is regular  She reports increase in physical activity by going for walks 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, but is seeking advice for healthy things to cook  She also knows she needs to avoid excessive sugar and discussed patient to opt for vegetables of fruits for a snack and to consume fruits in moderation as they are high in sugar content  Also emphasized to patient she is flagged for possible glaucoma and diabetic retinopathy and urged her to make an appointment with the eye doctor as well as reading material in the AVS      Patient has no further complaints other than what is mentioned in the ROS  The following portions of the patient's history were reviewed and updated as appropriate: allergies, current medications, past family history, past medical history, past social history, past surgical history and problem list     Review of Systems   Constitutional: Negative for activity change, appetite change, chills, fatigue and fever  HENT: Negative for congestion, dental problem, hearing loss, rhinorrhea, sinus pain and sore throat  Eyes: Negative for photophobia, pain and visual disturbance  Respiratory: Negative for cough, chest tightness, shortness of breath and wheezing  Cardiovascular: Negative for chest pain, palpitations and leg swelling  Gastrointestinal: Negative for abdominal pain, blood in stool, constipation, diarrhea, nausea and vomiting     Genitourinary: Negative for difficulty urinating, dysuria, frequency, hematuria, menstrual problem and urgency  Musculoskeletal: Negative for arthralgias, back pain, myalgias and neck pain  Skin: Negative for rash  Neurological: Negative for dizziness, weakness, light-headedness, numbness and headaches  Psychiatric/Behavioral: Negative for agitation, behavioral problems, sleep disturbance and suicidal ideas  Objective:    /80 (BP Location: Right arm, Patient Position: Sitting, Cuff Size: Large)   Pulse 100   Temp 98 3 °F (36 8 °C) (Temporal)   Resp 16   Ht 5' 2 5" (1 588 m)   Wt 85 7 kg (189 lb)   LMP 09/27/2020 (Exact Date)   SpO2 98%   Breastfeeding No   BMI 34 02 kg/m²      Physical Exam  Vitals signs and nursing note reviewed  Constitutional:       General: She is not in acute distress  Appearance: Normal appearance  She is well-developed  She is not ill-appearing  Comments: Blue/purple hair   HENT:      Head: Normocephalic and atraumatic  Right Ear: External ear normal       Left Ear: External ear normal       Nose: Nose normal  No congestion  Mouth/Throat:      Mouth: Mucous membranes are moist       Pharynx: Oropharynx is clear  Eyes:      Extraocular Movements: Extraocular movements intact  Conjunctiva/sclera: Conjunctivae normal       Pupils: Pupils are equal, round, and reactive to light  Neck:      Musculoskeletal: Normal range of motion and neck supple  Cardiovascular:      Rate and Rhythm: Normal rate and regular rhythm  Pulses: Normal pulses  no weak pulses          Dorsalis pedis pulses are 2+ on the right side and 2+ on the left side  Posterior tibial pulses are 2+ on the right side and 2+ on the left side  Heart sounds: Normal heart sounds  No murmur  Pulmonary:      Effort: Pulmonary effort is normal  No respiratory distress  Breath sounds: Normal breath sounds  No wheezing or rales  Chest:      Chest wall: No tenderness     Abdominal:      General: Bowel sounds are normal  There is no distension  Palpations: Abdomen is soft  Tenderness: There is no abdominal tenderness  Musculoskeletal: Normal range of motion  General: No swelling or tenderness  Feet:      Right foot:      Skin integrity: No ulcer, skin breakdown, erythema, warmth, callus or dry skin  Left foot:      Skin integrity: No ulcer, skin breakdown, erythema, warmth, callus or dry skin  Skin:     General: Skin is warm and dry  Capillary Refill: Capillary refill takes less than 2 seconds  Neurological:      Mental Status: She is alert and oriented to person, place, and time  Psychiatric:         Mood and Affect: Mood normal          Behavior: Behavior normal            Patient's shoes and socks removed  Right Foot/Ankle   Right Foot Inspection  Skin Exam: skin normal and skin intact no dry skin, no warmth, no callus, no erythema, no maceration, no abnormal color, no pre-ulcer, no ulcer and no callus                          Toe Exam: ROM and strength within normal limitsno swelling, no tenderness and  no right toe deformity    Vascular  Capillary refills: < 3 seconds  The right DP pulse is 2+  The right PT pulse is 2+  Left Foot/Ankle  Left Foot Inspection  Skin Exam: skin normal and skin intactno dry skin, no warmth, no erythema, no maceration, normal color, no pre-ulcer, no ulcer and no callus                         Toe Exam: ROM and strength within normal limitsno swelling, no tenderness and no left toe deformity                     Vascular  Capillary refills: < 3 seconds  The left DP pulse is 2+  The left PT pulse is 2+  Assign Risk Category:  No deformity present; No loss of protective sensation;  No weak pulses       Risk: 0    Marilyn Jain MD  09/30/20  8:46 PM

## 2020-09-30 NOTE — ASSESSMENT & PLAN NOTE
Patient's blood pressure is not at goal today  Goal is < 140/90  However repeat BP was 140/80 towards the end of the appointment  Patient denies any side effects with medications  Patient educated on the importance of weight loss, and appropriate dieting  Patient admits to be compliant with medications  Missed lisniopril 20 mg last night  Doesn't miss it often unless she is too tired     Will continue 20 mg lisinopril daily

## 2020-09-30 NOTE — ASSESSMENT & PLAN NOTE
Lab Results   Component Value Date    HGBA1C 8 4 (A) 09/30/2020     Patients last HBA1C was 8 3 - slightly worse  Goal is around 7   Home glucose is 's fasting & 140's after breakfast   Reports trying to cut sugar out of diet   Hypoglycemia symptoms of pre-syncope with throbbing headache - fixes with a gummy bear or honey - happens 1-2 times a month     - Will repeat HbA1C in 3 months  - Will continue with current medications of Amyrl 4 mg BID, as well as restart Januvia 100 mg daily    - Needs to make eye appointment for positive IRIS test at last appointment  - Patient educated on the importance of proper dieting and exercise , and nutrition referral placed  - If patient has too many hypoglycemic episodes she is instructed to call and make appointment sooner than our expected follow up in 4 weeks   Additionally, in the meantime will go down to 2 mg twice a day for Glimepiride instead of 4 mg twice a day

## 2020-10-12 DIAGNOSIS — E11.9 TYPE 2 DIABETES MELLITUS WITHOUT COMPLICATION, WITHOUT LONG-TERM CURRENT USE OF INSULIN (HCC): ICD-10-CM

## 2020-12-31 NOTE — ASSESSMENT & PLAN NOTE
Discharged from hospital on 6/27/2020 after one day admission with a 7 day course of Keflex  Patient completed antibiotics  No longer has any symptoms and infection resolved 
31-Dec-2020 20:10

## 2023-07-10 ENCOUNTER — HOSPITAL ENCOUNTER (EMERGENCY)
Facility: HOSPITAL | Age: 38
Discharge: HOME | End: 2023-07-10
Attending: EMERGENCY MEDICINE | Admitting: EMERGENCY MEDICINE
Payer: COMMERCIAL

## 2023-07-10 ENCOUNTER — APPOINTMENT (EMERGENCY)
Dept: ULTRASOUND IMAGING | Facility: HOSPITAL | Age: 38
End: 2023-07-10
Payer: COMMERCIAL

## 2023-07-10 VITALS
BODY MASS INDEX: 35.96 KG/M2 | RESPIRATION RATE: 15 BRPM | HEART RATE: 97 BPM | TEMPERATURE: 97.5 F | WEIGHT: 199.8 LBS | DIASTOLIC BLOOD PRESSURE: 88 MMHG | OXYGEN SATURATION: 98 % | SYSTOLIC BLOOD PRESSURE: 139 MMHG

## 2023-07-10 DIAGNOSIS — R10.13 EPIGASTRIC ABDOMINAL PAIN: ICD-10-CM

## 2023-07-10 DIAGNOSIS — K80.20 CHOLELITHIASIS: Primary | ICD-10-CM

## 2023-07-10 LAB
ALBUMIN SERPL BCP-MCNC: 4.3 G/DL (ref 3.5–5)
ALP SERPL-CCNC: 100 U/L (ref 34–104)
ALT SERPL W P-5'-P-CCNC: 26 U/L (ref 7–52)
ANION GAP SERPL CALCULATED.3IONS-SCNC: 12 MMOL/L
AST SERPL W P-5'-P-CCNC: 18 U/L (ref 13–39)
BACTERIA UR QL AUTO: ABNORMAL /HPF
BASOPHILS # BLD MANUAL: 0 THOUSAND/UL (ref 0–0.1)
BASOPHILS NFR MAR MANUAL: 0 % (ref 0–1)
BILIRUB SERPL-MCNC: 0.68 MG/DL (ref 0.2–1)
BILIRUB UR QL STRIP: NEGATIVE
BUN SERPL-MCNC: 13 MG/DL (ref 5–25)
CALCIUM SERPL-MCNC: 10 MG/DL (ref 8.4–10.2)
CHLORIDE SERPL-SCNC: 97 MMOL/L (ref 96–108)
CLARITY UR: ABNORMAL
CO2 SERPL-SCNC: 25 MMOL/L (ref 21–32)
COLOR UR: ABNORMAL
CREAT SERPL-MCNC: 0.86 MG/DL (ref 0.6–1.3)
EOSINOPHIL # BLD MANUAL: 0 THOUSAND/UL (ref 0–0.4)
EOSINOPHIL NFR BLD MANUAL: 0 % (ref 0–6)
ERYTHROCYTE [DISTWIDTH] IN BLOOD BY AUTOMATED COUNT: 12.2 % (ref 11.6–15.1)
EXT PREGNANCY TEST URINE: NEGATIVE
EXT. CONTROL: NORMAL
GFR SERPL CREATININE-BSD FRML MDRD: 86 ML/MIN/1.73SQ M
GLUCOSE SERPL-MCNC: 352 MG/DL (ref 65–140)
GLUCOSE UR STRIP-MCNC: ABNORMAL MG/DL
HCT VFR BLD AUTO: 40 % (ref 34.8–46.1)
HGB BLD-MCNC: 13.1 G/DL (ref 11.5–15.4)
HGB UR QL STRIP.AUTO: NEGATIVE
KETONES UR STRIP-MCNC: ABNORMAL MG/DL
LEUKOCYTE ESTERASE UR QL STRIP: NEGATIVE
LG PLATELETS BLD QL SMEAR: PRESENT
LIPASE SERPL-CCNC: 24 U/L (ref 11–82)
LYMPHOCYTES # BLD AUTO: 0.79 THOUSAND/UL (ref 0.6–4.47)
LYMPHOCYTES # BLD AUTO: 7 % (ref 14–44)
MCH RBC QN AUTO: 29.8 PG (ref 26.8–34.3)
MCHC RBC AUTO-ENTMCNC: 32.8 G/DL (ref 31.4–37.4)
MCV RBC AUTO: 91 FL (ref 82–98)
MONOCYTES # BLD AUTO: 0.23 THOUSAND/UL (ref 0–1.22)
MONOCYTES NFR BLD: 2 % (ref 4–12)
NEUTROPHILS # BLD MANUAL: 10.3 THOUSAND/UL (ref 1.85–7.62)
NEUTS BAND NFR BLD MANUAL: 2 % (ref 0–8)
NEUTS SEG NFR BLD AUTO: 89 % (ref 43–75)
NITRITE UR QL STRIP: NEGATIVE
NON-SQ EPI CELLS URNS QL MICRO: ABNORMAL /HPF
PH UR STRIP.AUTO: 6 [PH]
PLATELET # BLD AUTO: 564 THOUSANDS/UL (ref 149–390)
PLATELET BLD QL SMEAR: ABNORMAL
PMV BLD AUTO: 9.2 FL (ref 8.9–12.7)
POTASSIUM SERPL-SCNC: 4.4 MMOL/L (ref 3.5–5.3)
PROT SERPL-MCNC: 8.1 G/DL (ref 6.4–8.4)
PROT UR STRIP-MCNC: ABNORMAL MG/DL
RBC # BLD AUTO: 4.4 MILLION/UL (ref 3.81–5.12)
RBC #/AREA URNS AUTO: ABNORMAL /HPF
RBC MORPH BLD: NORMAL
SODIUM SERPL-SCNC: 134 MMOL/L (ref 135–147)
SP GR UR STRIP.AUTO: 1.01 (ref 1–1.04)
UROBILINOGEN UA: NEGATIVE MG/DL
WBC # BLD AUTO: 11.32 THOUSAND/UL (ref 4.31–10.16)
WBC #/AREA URNS AUTO: ABNORMAL /HPF

## 2023-07-10 PROCEDURE — 85007 BL SMEAR W/DIFF WBC COUNT: CPT

## 2023-07-10 PROCEDURE — 96375 TX/PRO/DX INJ NEW DRUG ADDON: CPT

## 2023-07-10 PROCEDURE — C9113 INJ PANTOPRAZOLE SODIUM, VIA: HCPCS

## 2023-07-10 PROCEDURE — 85027 COMPLETE CBC AUTOMATED: CPT

## 2023-07-10 PROCEDURE — 81003 URINALYSIS AUTO W/O SCOPE: CPT

## 2023-07-10 PROCEDURE — 99284 EMERGENCY DEPT VISIT MOD MDM: CPT

## 2023-07-10 PROCEDURE — 80053 COMPREHEN METABOLIC PANEL: CPT

## 2023-07-10 PROCEDURE — 83690 ASSAY OF LIPASE: CPT

## 2023-07-10 PROCEDURE — 81025 URINE PREGNANCY TEST: CPT

## 2023-07-10 PROCEDURE — 36415 COLL VENOUS BLD VENIPUNCTURE: CPT

## 2023-07-10 PROCEDURE — 96365 THER/PROPH/DIAG IV INF INIT: CPT

## 2023-07-10 PROCEDURE — 76705 ECHO EXAM OF ABDOMEN: CPT

## 2023-07-10 PROCEDURE — 81001 URINALYSIS AUTO W/SCOPE: CPT

## 2023-07-10 RX ORDER — PANTOPRAZOLE SODIUM 40 MG/10ML
40 INJECTION, POWDER, LYOPHILIZED, FOR SOLUTION INTRAVENOUS ONCE
Status: COMPLETED | OUTPATIENT
Start: 2023-07-10 | End: 2023-07-10

## 2023-07-10 RX ORDER — ONDANSETRON 4 MG/1
4 TABLET, FILM COATED ORAL EVERY 12 HOURS PRN
Qty: 14 TABLET | Refills: 0 | Status: SHIPPED | OUTPATIENT
Start: 2023-07-10 | End: 2023-07-10

## 2023-07-10 RX ORDER — ONDANSETRON 4 MG/1
4 TABLET, FILM COATED ORAL EVERY 12 HOURS PRN
Qty: 14 TABLET | Refills: 0 | Status: SHIPPED | OUTPATIENT
Start: 2023-07-10 | End: 2023-07-17

## 2023-07-10 RX ORDER — KETOROLAC TROMETHAMINE 30 MG/ML
15 INJECTION, SOLUTION INTRAMUSCULAR; INTRAVENOUS ONCE
Status: COMPLETED | OUTPATIENT
Start: 2023-07-10 | End: 2023-07-10

## 2023-07-10 RX ORDER — ONDANSETRON 2 MG/ML
4 INJECTION INTRAMUSCULAR; INTRAVENOUS ONCE
Status: COMPLETED | OUTPATIENT
Start: 2023-07-10 | End: 2023-07-10

## 2023-07-10 RX ORDER — SUCRALFATE 1 G/1
1 TABLET ORAL ONCE
Status: COMPLETED | OUTPATIENT
Start: 2023-07-10 | End: 2023-07-10

## 2023-07-10 RX ORDER — PANTOPRAZOLE SODIUM 20 MG/1
20 TABLET, DELAYED RELEASE ORAL DAILY
Qty: 30 TABLET | Refills: 0 | Status: SHIPPED | OUTPATIENT
Start: 2023-07-10 | End: 2023-08-09

## 2023-07-10 RX ORDER — PANTOPRAZOLE SODIUM 20 MG/1
20 TABLET, DELAYED RELEASE ORAL DAILY
Qty: 30 TABLET | Refills: 0 | Status: SHIPPED | OUTPATIENT
Start: 2023-07-10 | End: 2023-07-10

## 2023-07-10 RX ADMIN — KETOROLAC TROMETHAMINE 15 MG: 30 INJECTION, SOLUTION INTRAMUSCULAR; INTRAVENOUS at 15:42

## 2023-07-10 RX ADMIN — PANTOPRAZOLE SODIUM 40 MG: 40 INJECTION, POWDER, FOR SOLUTION INTRAVENOUS at 15:48

## 2023-07-10 RX ADMIN — ONDANSETRON 4 MG: 2 INJECTION INTRAMUSCULAR; INTRAVENOUS at 15:44

## 2023-07-10 RX ADMIN — SUCRALFATE 1 G: 1 TABLET ORAL at 15:38

## 2023-07-10 RX ADMIN — SODIUM CHLORIDE, SODIUM LACTATE, POTASSIUM CHLORIDE, AND CALCIUM CHLORIDE 1000 ML: .6; .31; .03; .02 INJECTION, SOLUTION INTRAVENOUS at 15:54

## 2023-07-10 NOTE — ED PROVIDER NOTES
History  Chief Complaint   Patient presents with   • Abdominal Pain     Abdominal pains since this morning. C/o vomiting and nausea. Took tums      44yo F w/ h/o PUD, DM presenting for abdominal pain. Approximately 9hrs ago, Pt woke up with intermittent sharp epigastric abdominal pain worse with food. Additionally, food/liquid would trigger NBNB emesis. Has had a similar episode several years ago when she was first diagnosed with PUD. Has not been on PPI medication for several years. Has not tried anything for the pain. Denies F/C, diarrhea, hematochezia, CP, SOB, h/o abdominal surgeries, significant EtOH use, skin discoloration, back pain,  sx. History provided by:  Patient      Prior to Admission Medications   Prescriptions Last Dose Informant Patient Reported? Taking?    Blood Pressure Monitoring (B-D ASSURE BPM/AUTO ARM CUFF) MISC   No No   Sig: by Does not apply route daily   Multiple Vitamin (MULTIVITAMIN) capsule  Self Yes No   Sig: Take 1 capsule by mouth every morning    cyanocobalamin (VITAMIN B-12) 500 mcg tablet  Self Yes No   Sig: Take 500 mcg by mouth every morning    glimepiride (AMARYL) 4 mg tablet   No No   Sig: Take 1 tablet (4 mg total) by mouth 2 (two) times a day   ibuprofen (MOTRIN) 600 mg tablet   No No   Sig: Take 1 tablet (600 mg total) by mouth every 6 (six) hours as needed for mild pain (cramping)   Patient not taking: Reported on 6/27/2020   lisinopril (ZESTRIL) 20 mg tablet   No No   Sig: Take 1 tablet (20 mg total) by mouth daily   sitaGLIPtin (JANUVIA) 100 mg tablet   No No   Sig: Take 1 tablet (100 mg total) by mouth every morning   vitamin A 2250 MCG (7500 UT) capsule   Yes No   Sig: Take 7,500 Units by mouth daily      Facility-Administered Medications: None       Past Medical History:   Diagnosis Date   • Heart murmur    • Heart palpitations    • Hypertension    • Wears glasses        Past Surgical History:   Procedure Laterality Date   • NO PAST SURGERIES     • [place] LAPS MYOMECTOMY EXC 5/> MYOMAS >250 GRAMS N/A 4/22/2019    Procedure: Lisseth Soares;  Surgeon: Jessica Olmos MD;  Location: [facility];  Service: Gynecology   • UTERINE FIBROID SURGERY         Family History   Problem Relation Age of Onset   • Diabetes Mother    • Hypertension Father    • Diabetes Maternal Grandmother    • Heart disease Paternal Grandmother    • Ulcers Paternal Grandfather    • Heart disease Family    • Cancer Maternal Aunt    • Cancer Paternal Aunt      I have reviewed and agree with the history as documented. E-Cigarette/Vaping   • E-Cigarette Use Never User      E-Cigarette/Vaping Substances     Social History     Tobacco Use   • Smoking status: Never   • Smokeless tobacco: Never   Vaping Use   • Vaping Use: Never used   Substance Use Topics   • Alcohol use: Never   • Drug use: No        Review of Systems   Constitutional: Negative. HENT: Negative. Respiratory: Negative. Cardiovascular: Negative. Gastrointestinal: Positive for abdominal pain, nausea and vomiting. Negative for diarrhea. Genitourinary: Negative. Musculoskeletal: Negative. Skin: Negative. Neurological: Negative. Psychiatric/Behavioral: Negative. Physical Exam  ED Triage Vitals   Temperature Pulse Respirations Blood Pressure SpO2   07/10/23 1313 07/10/23 1313 07/10/23 1319 07/10/23 1313 07/10/23 1313   97.5 °F (36.4 °C) 101 20 156/91 96 %      Temp Source Heart Rate Source Patient Position - Orthostatic VS BP Location FiO2 (%)   07/10/23 1313 07/10/23 1313 07/10/23 1313 07/10/23 1313 --   Oral Monitor Sitting Left arm       Pain Score       07/10/23 1542       2             Orthostatic Vital Signs  Vitals:    07/10/23 1313 07/10/23 1550   BP: 156/91 139/88   Pulse: 101 97   Patient Position - Orthostatic VS: Sitting Sitting       Physical Exam  Constitutional:       Appearance: She is well-developed. HENT:      Head: Normocephalic and atraumatic.       Right Ear: External ear normal. Left Ear: External ear normal.      Nose: Nose normal. No rhinorrhea. Mouth/Throat:      Mouth: Mucous membranes are moist.      Pharynx: Oropharynx is clear. Eyes:      Extraocular Movements: Extraocular movements intact. Conjunctiva/sclera: Conjunctivae normal.   Cardiovascular:      Rate and Rhythm: Regular rhythm. Tachycardia present. Pulses: Normal pulses. Heart sounds: Normal heart sounds. Pulmonary:      Effort: Pulmonary effort is normal.      Breath sounds: Normal breath sounds. Abdominal:      General: Abdomen is flat. Bowel sounds are normal.      Tenderness: There is abdominal tenderness in the right upper quadrant, epigastric area and left upper quadrant. There is no guarding or rebound. Negative signs include Hinton's sign and Rovsing's sign. Skin:     General: Skin is warm and dry. Capillary Refill: Capillary refill takes less than 2 seconds. Neurological:      General: No focal deficit present. Mental Status: She is alert and oriented to person, place, and time. ED Medications  Medications   lactated ringers bolus 1,000 mL (1,000 mL Intravenous New Bag 7/10/23 1554)   sucralfate (CARAFATE) tablet 1 g (1 g Oral Given 7/10/23 1538)   pantoprazole (PROTONIX) injection 40 mg (40 mg Intravenous Given 7/10/23 1548)   ondansetron (ZOFRAN) injection 4 mg (4 mg Intravenous Given 7/10/23 1544)   ketorolac (TORADOL) injection 15 mg (15 mg Intravenous Given 7/10/23 1542)       Diagnostic Studies  Results Reviewed     Procedure Component Value Units Date/Time    CBC and differential [[id number]]  (Abnormal) Collected: 07/10/23 1543    Lab Status: Final result Specimen: Blood from Arm, Left Updated: 07/10/23 1621     WBC 11.32 Thousand/uL      RBC 4.40 Million/uL      Hemoglobin 13.1 g/dL      Hematocrit 40.0 %      MCV 91 fL      MCH 29.8 pg      MCHC 32.8 g/dL      RDW 12.2 %      MPV 9.2 fL      Platelets 410 Thousands/uL     Narrative:       This is an appended report. These results have been appended to a previously verified report. Manual Differential(PHLEBS Do Not Order) [[id number]]  (Abnormal) Collected: 07/10/23 1543    Lab Status: Final result Specimen: Blood from Arm, Left Updated: 07/10/23 1621     Segmented % 89 %      Bands % 2 %      Lymphocytes % 7 %      Monocytes % 2 %      Eosinophils, % 0 %      Basophils % 0 %      Absolute Neutrophils 10.30 Thousand/uL      Lymphocytes Absolute 0.79 Thousand/uL      Monocytes Absolute 0.23 Thousand/uL      Eosinophils Absolute 0.00 Thousand/uL      Basophils Absolute 0.00 Thousand/uL      Total Counted --     RBC Morphology Normal     Platelet Estimate Increased     Large Platelet Present    RBC Morphology Reflex Test [[id number]] Collected: 07/10/23 1543    Lab Status:  In process Specimen: Blood from Arm, Left Updated: 07/10/23 1621    Comprehensive metabolic panel [[id number]]  (Abnormal) Collected: 07/10/23 1543    Lab Status: Final result Specimen: Blood from Arm, Left Updated: 07/10/23 1606     Sodium 134 mmol/L      Potassium 4.4 mmol/L      Chloride 97 mmol/L      CO2 25 mmol/L      ANION GAP 12 mmol/L      BUN 13 mg/dL      Creatinine 0.86 mg/dL      Glucose 352 mg/dL      Calcium 10.0 mg/dL      AST 18 U/L      ALT 26 U/L      Alkaline Phosphatase 100 U/L      Total Protein 8.1 g/dL      Albumin 4.3 g/dL      Total Bilirubin 0.68 mg/dL      eGFR 86 ml/min/1.73sq m     Narrative:      [facility] guidelines for Chronic Kidney Disease (CKD):   •  Stage 1 with normal or high GFR (GFR > 90 mL/min/1.73 square meters)  •  Stage 2 Mild CKD (GFR = 60-89 mL/min/1.73 square meters)  •  Stage 3A Moderate CKD (GFR = 45-59 mL/min/1.73 square meters)  •  Stage 3B Moderate CKD (GFR = 30-44 mL/min/1.73 square meters)  •  Stage 4 Severe CKD (GFR = 15-29 mL/min/1.73 square meters)  •  Stage 5 End Stage CKD (GFR <15 mL/min/1.73 square meters)  Note: GFR calculation is accurate only with a steady state creatinine    Lipase [[id number]]  (Normal) Collected: 07/10/23 1543    Lab Status: Final result Specimen: Blood from Arm, Left Updated: 07/10/23 1606     Lipase 24 u/L     Urine Microscopic [[id number]]  (Abnormal) Collected: 07/10/23 1351    Lab Status: Final result Specimen: Urine, Other Updated: 07/10/23 1426     RBC, UA 0-1 /hpf      WBC, UA 2-4 /hpf      Epithelial Cells Occasional /hpf      Bacteria, UA Innumerable /hpf     UA w Reflex to Microscopic w Reflex to Culture [[id number]]  (Abnormal) Collected: 07/10/23 1351    Lab Status: Final result Specimen: Urine, Other Updated: 07/10/23 1418     Color, UA Straw     Clarity, UA Slightly Cloudy     Specific Gravity, UA 1.015     pH, UA 6.0     Leukocytes, UA Negative     Nitrite, UA Negative     Protein, UA 30 (1+) mg/dl      Glucose, UA >=1000 (1%) mg/dl      Ketones, UA 50 (2+) mg/dl      Bilirubin, UA Negative     Occult Blood, UA Negative     UROBILINOGEN UA Negative mg/dL     POCT pregnancy, urine [[id number]]  (Normal) Resulted: 07/10/23 1352    Lab Status: Final result Updated: 07/10/23 1352     EXT Preg Test, Ur Negative     Control Valid                 US right upper quadrant   Final Result by Helen Samson MD (07/10 1553)      Gallstones without secondary signs of acute cholecystitis. Workstation performed: [id number]               Procedures  Procedures      ED Course  ED Course as of 07/10/23 1634   Mon Jul 10, 2023   1342 Ddx includes but not limited to cholelithiasis, pancreatitis, PUD, gastritis. [address] right upper quadrant  IMPRESSION:     Gallstones without secondary signs of acute cholecystitis. SBIRT 22yo+    Flowsheet Row Most Recent Value   Initial Alcohol Screen: US AUDIT-C     1. How often do you have a drink containing alcohol? 0 Filed at: 07/10/2023 1551   2. How many drinks containing alcohol do you have on a typical day you are drinking? 0 Filed at: 07/10/2023 1551   3a. Male UNDER 65:  How often do you have five or more drinks on one occasion? 0 Filed at: 07/10/2023 1551   3b. FEMALE Any Age, or MALE 65+: How often do you have 4 or more drinks on one occassion? 0 Filed at: 07/10/2023 1551   Audit-C Score 0 Filed at: 07/10/2023 1551   [place]: How many times in the past year have you. .. Used an illegal drug or used a prescription medication for non-medical reasons? Never Filed at: 07/10/2023 1551                Medical Decision Making  Imaging shows cholelithiasis which is likely contributing to her pain. Additionally, there are c/f possible PUD for which we will treat with pharmacotherapy and have her f/u with GI for further evaluation. Otherwise, all other emergent pathology considered has been r/o. Amount and/or Complexity of Data Reviewed  Labs: ordered. Radiology: ordered. Decision-making details documented in ED Course. Risk  Prescription drug management. Disposition  Final diagnoses:   Cholelithiasis   Epigastric abdominal pain     Time reflects when diagnosis was documented in both MDM as applicable and the Disposition within this note     Time User Action Codes Description Comment    7/10/2023  4:27 PM Bry Buckley Add [K80.20] Cholelithiasis     7/10/2023  4:27 PM Bry Buckley Add [R10.13] Epigastric abdominal pain       ED Disposition     ED Disposition   Discharge    Condition   Stable    Date/Time   Mon Jul 10, 2023  4:32 PM    Comment   Alek Cui discharge to home/self care.                Follow-up Information     Follow up With Specialties Details Why Contact Info Additional [address] [facility] Emergency Medicine Go to  If symptoms worsen [address] [id number] [address] [facility] Emergency Department          Patient's Medications   Discharge Prescriptions    ONDANSETRON (ZOFRAN) 4 MG TABLET    Take 1 tablet (4 mg total) by mouth every 12 (twelve) hours as needed for nausea or vomiting for up to 7 days       Start Date: 7/10/2023 End Date: 7/17/2023       Order Dose: 4 mg       Quantity: 14 tablet    Refills: 0    PANTOPRAZOLE (PROTONIX) 20 MG TABLET    Take 1 tablet (20 mg total) by mouth daily       Start Date: 7/10/2023 End Date: 8/9/2023       Order Dose: 20 mg       Quantity: 30 tablet    Refills: 0         PDMP Review     None           ED Provider  Attending physically available and evaluated Kandy Sal. I managed the patient along with the ED Attending.     Electronically Signed by         Frankie Lr MD  07/10/23 6935

## 2023-07-10 NOTE — DISCHARGE INSTRUCTIONS
Return to the ER if you develop: Worsening abdominal pain, flank pain, yellowing of your skin, fever/chills, bloody vomit, change in stool color, worsening of your condition overall.

## 2023-10-03 ENCOUNTER — HOSPITAL ENCOUNTER (EMERGENCY)
Facility: HOSPITAL | Age: 38
Discharge: HOME | End: 2023-10-03
Attending: EMERGENCY MEDICINE | Admitting: EMERGENCY MEDICINE
Payer: COMMERCIAL

## 2023-10-03 VITALS
HEART RATE: 109 BPM | BODY MASS INDEX: 35.2 KG/M2 | TEMPERATURE: 97.3 F | OXYGEN SATURATION: 98 % | SYSTOLIC BLOOD PRESSURE: 194 MMHG | RESPIRATION RATE: 20 BRPM | WEIGHT: 195.55 LBS | DIASTOLIC BLOOD PRESSURE: 101 MMHG

## 2023-10-03 DIAGNOSIS — J06.9 UPPER RESPIRATORY TRACT INFECTION, UNSPECIFIED TYPE: Primary | ICD-10-CM

## 2023-10-03 PROCEDURE — 99283 EMERGENCY DEPT VISIT LOW MDM: CPT

## 2023-10-03 PROCEDURE — 87636 SARSCOV2 & INF A&B AMP PRB: CPT | Performed by: EMERGENCY MEDICINE

## 2023-10-03 PROCEDURE — 99284 EMERGENCY DEPT VISIT MOD MDM: CPT | Performed by: EMERGENCY MEDICINE

## 2023-10-03 NOTE — ED PROVIDER NOTES
History  Chief Complaint   Patient presents with   • Nasal Congestion     Pt c/o sneezing and nasal congestion since Sunday. 40-year-old female, presents with congestion and sneezing for the past 2 days. Denies any fevers, no shortness of breath. Patient is concerned about COVID infection and would like to be tested. History provided by:  Patient   used: No        Prior to Admission Medications   Prescriptions Last Dose Informant Patient Reported? Taking?    Blood Pressure Monitoring (B-D ASSURE BPM/AUTO ARM CUFF) MISC   No No   Sig: by Does not apply route daily   Multiple Vitamin (MULTIVITAMIN) capsule  Self Yes No   Sig: Take 1 capsule by mouth every morning    cyanocobalamin (VITAMIN B-12) 500 mcg tablet  Self Yes No   Sig: Take 500 mcg by mouth every morning    glimepiride (AMARYL) 4 mg tablet   No No   Sig: Take 1 tablet (4 mg total) by mouth 2 (two) times a day   ibuprofen (MOTRIN) 600 mg tablet   No No   Sig: Take 1 tablet (600 mg total) by mouth every 6 (six) hours as needed for mild pain (cramping)   Patient not taking: Reported on 6/27/2020   lisinopril (ZESTRIL) 20 mg tablet   No No   Sig: Take 1 tablet (20 mg total) by mouth daily   ondansetron (ZOFRAN) 4 mg tablet   No No   Sig: Take 1 tablet (4 mg total) by mouth every 12 (twelve) hours as needed for nausea or vomiting for up to 7 days   pantoprazole (PROTONIX) 20 mg tablet   No No   Sig: Take 1 tablet (20 mg total) by mouth daily   sitaGLIPtin (JANUVIA) 100 mg tablet   No No   Sig: Take 1 tablet (100 mg total) by mouth every morning   vitamin A 2250 MCG (7500 UT) capsule   Yes No   Sig: Take 7,500 Units by mouth daily      Facility-Administered Medications: None       Past Medical History:   Diagnosis Date   • Heart murmur    • Heart palpitations    • Hypertension    • Wears glasses        Past Surgical History:   Procedure Laterality Date   • NO PAST SURGERIES     • [place] LAPS MYOMECTOMY EXC 5/> MYOMAS >250 GRAMS N/A 4/22/2019    Procedure: Sachin Quispe;  Surgeon: Jill Aguilar MD;  Location: [facility] OR;  Service: Gynecology   • UTERINE FIBROID SURGERY         Family History   Problem Relation Age of Onset   • Diabetes Mother    • Hypertension Father    • Diabetes Maternal Grandmother    • Heart disease Paternal Grandmother    • Ulcers Paternal Grandfather    • Heart disease Family    • Cancer Maternal Aunt    • Cancer Paternal Aunt      I have reviewed and agree with the history as documented. E-Cigarette/Vaping   • E-Cigarette Use Never User      E-Cigarette/Vaping Substances     Social History     Tobacco Use   • Smoking status: Never   • Smokeless tobacco: Never   Vaping Use   • Vaping Use: Never used   Substance Use Topics   • Alcohol use: Never   • Drug use: No       Review of Systems   Constitutional: Negative. HENT: Positive for congestion. Eyes: Negative. Respiratory: Negative for shortness of breath. Cardiovascular: Negative. Gastrointestinal: Negative. Neurological: Negative. Physical Exam  Physical Exam  Vitals and nursing note reviewed. Constitutional:       General: She is not in acute distress. HENT:      Head: Normocephalic. Mouth/Throat:      Mouth: Mucous membranes are moist.      Pharynx: Oropharynx is clear. No oropharyngeal exudate or posterior oropharyngeal erythema. Eyes:      Extraocular Movements: Extraocular movements intact. Conjunctiva/sclera: Conjunctivae normal.      Pupils: Pupils are equal, round, and reactive to light. Cardiovascular:      Rate and Rhythm: Regular rhythm. Tachycardia present. Pulmonary:      Effort: Pulmonary effort is normal.      Breath sounds: Normal breath sounds. No wheezing. Abdominal:      Palpations: Abdomen is soft. Tenderness: There is no abdominal tenderness. Musculoskeletal:         General: Normal range of motion. Skin:     General: Skin is warm and dry.    Neurological:      General: No focal deficit present. Mental Status: She is alert and oriented to person, place, and time. Motor: No weakness. Gait: Gait normal.         Vital Signs  ED Triage Vitals [10/03/23 0753]   Temperature Pulse Respirations Blood Pressure SpO2   (!) 97.3 °F (36.3 °C) (!) 109 20 (!) 194/101 98 %      Temp Source Heart Rate Source Patient Position - Orthostatic VS BP Location FiO2 (%)   Tympanic Monitor Sitting Left arm --      Pain Score       --           Vitals:    10/03/23 0753   BP: (!) 194/101   Pulse: (!) 109   Patient Position - Orthostatic VS: Sitting         Visual Acuity      ED Medications  Medications - No data to display    Diagnostic Studies  Results Reviewed     Procedure Component Value Units Date/Time    FLU/COVID - if FLU clinically relevant [[id number]] Collected: 10/03/23 0821    Lab Status: No result Specimen: Nares from Nose                  No orders to display              Procedures  Procedures         ED Course                               SBIRT 22yo+    Flowsheet Row Most Recent Value   Initial Alcohol Screen: US AUDIT-C     1. How often do you have a drink containing alcohol? 0 Filed at: 10/03/2023 0758   2. How many drinks containing alcohol do you have on a typical day you are drinking? 0 Filed at: 10/03/2023 0758   3a. Male UNDER 65: How often do you have five or more drinks on one occasion? 0 Filed at: 10/03/2023 0758   3b. FEMALE Any Age, or MALE 65+: How often do you have 4 or more drinks on one occassion? 0 Filed at: 10/03/2023 0758   Audit-C Score 0 Filed at: 10/03/2023 4319   [place]: How many times in the past year have you. .. Used an illegal drug or used a prescription medication for non-medical reasons? Never Filed at: 10/03/2023 7560                    Medical Decision Making  79-year-old female, presents with congestion and sneezing. Differential diagnosis includes URI, bronchitis, pneumonia among other diagnoses.   Patient looks well in no distress, denies any difficulty breathing, normal respiratory effort with clear lungs on exam.  Patient concerned about COVID and would like to be tested. Patient noted to be hypertensive in ED, states that she is nervous. Has history of hypertension and monitors her blood pressure regularly. Patient denies any headache, acute visual changes, chest pain, shortness of breath. We will continue her medication, follow-up with primary doctor to have blood pressure rechecked. COVID, influenza PCR testing performed, patient will follow-up with results. Amount and/or Complexity of Data Reviewed  Labs: ordered. Decision-making details documented in ED Course. I have reviewed diagnosis with patient. Follow-up plan reviewed. Precautions for acute return for re-evaluation are reviewed. Opportunity to ask questions was provided. Patient verbalizes understanding. Disposition  Final diagnoses:   Upper respiratory tract infection, unspecified type     Time reflects when diagnosis was documented in both MDM as applicable and the Disposition within this note     Time User Action Codes Description Comment    10/3/2023  8:17 AM Melo Schulz Add [J06.9] Upper respiratory tract infection, unspecified type       ED Disposition     ED Disposition   Discharge    Condition   Stable    Date/Time   Tue Oct 3, 2023  8:17 AM    Comment   Cezar Cui discharge to home/self care.                Follow-up Information     Follow up With Specialties Details Why [address] [facility] 2nd Floor Family Medicine   [address]  [phone]            Discharge Medication List as of 10/3/2023  8:18 AM      CONTINUE these medications which have NOT CHANGED    Details   Blood Pressure Monitoring (B-D ASSURE BPM/AUTO ARM CUFF) MISC by Does not apply route daily, Starting Wed 7/8/2020, Normal      cyanocobalamin (VITAMIN B-12) 500 mcg tablet Take 500 mcg by mouth every morning , Historical Med glimepiride (AMARYL) 4 mg tablet Take 1 tablet (4 mg total) by mouth 2 (two) times a day, Starting Tue 3/17/2020, Normal      ibuprofen (MOTRIN) 600 mg tablet Take 1 tablet (600 mg total) by mouth every 6 (six) hours as needed for mild pain (cramping), Starting Thu 7/11/2019, Normal      lisinopril (ZESTRIL) 20 mg tablet Take 1 tablet (20 mg total) by mouth daily, Starting Tue 3/17/2020, Normal      Multiple Vitamin (MULTIVITAMIN) capsule Take 1 capsule by mouth every morning , Historical Med      ondansetron (ZOFRAN) 4 mg tablet Take 1 tablet (4 mg total) by mouth every 12 (twelve) hours as needed for nausea or vomiting for up to 7 days, Starting Mon 7/10/2023, Until Mon 7/17/2023 at 2359, Normal      pantoprazole (PROTONIX) 20 mg tablet Take 1 tablet (20 mg total) by mouth daily, Starting Mon 7/10/2023, Until Wed 8/9/2023, Normal      sitaGLIPtin (JANUVIA) 100 mg tablet Take 1 tablet (100 mg total) by mouth every morning, Starting Wed 10/14/2020, Normal      vitamin A 2250 MCG (7500 UT) capsule Take 7,500 Units by mouth daily, Historical Med             No discharge procedures on file.     PDMP Review     None          ED Provider  Electronically Signed by           Nain Mayes MD  10/03/23 7223

## 2023-10-04 LAB
FLUAV RNA RESP QL NAA+PROBE: NEGATIVE
FLUBV RNA RESP QL NAA+PROBE: NEGATIVE
SARS-COV-2 RNA RESP QL NAA+PROBE: NEGATIVE

## 2024-03-04 ENCOUNTER — HOSPITAL ENCOUNTER (EMERGENCY)
Facility: HOSPITAL | Age: 39
Discharge: HOME | End: 2024-03-04
Attending: EMERGENCY MEDICINE
Payer: COMMERCIAL

## 2024-03-04 VITALS
TEMPERATURE: 98.8 F | SYSTOLIC BLOOD PRESSURE: 172 MMHG | DIASTOLIC BLOOD PRESSURE: 103 MMHG | RESPIRATION RATE: 18 BRPM | BODY MASS INDEX: 32.82 KG/M2 | OXYGEN SATURATION: 98 % | HEART RATE: 105 BPM | WEIGHT: 182.32 LBS

## 2024-03-04 DIAGNOSIS — Z02.89 ENCOUNTER TO OBTAIN EXCUSE FROM WORK: Primary | ICD-10-CM

## 2024-03-04 PROCEDURE — 99281 EMR DPT VST MAYX REQ PHY/QHP: CPT

## 2024-03-04 PROCEDURE — 99282 EMERGENCY DEPT VISIT SF MDM: CPT | Performed by: EMERGENCY MEDICINE

## 2024-03-04 NOTE — Clinical Note
Carlita Cui was seen and treated in our emergency department on 3/4/2024.                Diagnosis:     Carlita  .    She may return on this date: 03/05/2024         If you have any questions or concerns, please don't hesitate to call.      Sotero Gaston, DO    ______________________________           _______________          _______________  Hospital Representative                              Date                                Time

## 2024-03-05 NOTE — ED PROVIDER NOTES
History  Chief Complaint   Patient presents with    Letter for School/Work     States she needs a work note because she missed work due to being sore from hot yoga     Patient is a 38-year-old female, she went to hot yoga and was unable to go to work.  No complaints at this time.  Requesting work note.          Prior to Admission Medications   Prescriptions Last Dose Informant Patient Reported? Taking?   Blood Pressure Monitoring (B-D ASSURE BPM/AUTO ARM CUFF) MISC   No No   Sig: by Does not apply route daily   Multiple Vitamin (MULTIVITAMIN) capsule  Self Yes No   Sig: Take 1 capsule by mouth every morning    cyanocobalamin (VITAMIN B-12) 500 mcg tablet  Self Yes No   Sig: Take 500 mcg by mouth every morning    glimepiride (AMARYL) 4 mg tablet   No No   Sig: Take 1 tablet (4 mg total) by mouth 2 (two) times a day   ibuprofen (MOTRIN) 600 mg tablet   No No   Sig: Take 1 tablet (600 mg total) by mouth every 6 (six) hours as needed for mild pain (cramping)   Patient not taking: Reported on 6/27/2020   lisinopril (ZESTRIL) 20 mg tablet   No No   Sig: Take 1 tablet (20 mg total) by mouth daily   ondansetron (ZOFRAN) 4 mg tablet   No No   Sig: Take 1 tablet (4 mg total) by mouth every 12 (twelve) hours as needed for nausea or vomiting for up to 7 days   pantoprazole (PROTONIX) 20 mg tablet   No No   Sig: Take 1 tablet (20 mg total) by mouth daily   sitaGLIPtin (JANUVIA) 100 mg tablet   No No   Sig: Take 1 tablet (100 mg total) by mouth every morning   vitamin A 2250 MCG (7500 UT) capsule   Yes No   Sig: Take 7,500 Units by mouth daily      Facility-Administered Medications: None       Past Medical History:   Diagnosis Date    Heart murmur     Heart palpitations     Hypertension     Wears glasses        Past Surgical History:   Procedure Laterality Date    NO PAST SURGERIES      IN LAPS MYOMECTOMY EXC 5/> MYOMAS >250 GRAMS N/A 4/22/2019    Procedure: MYOMECTOMY LAPAROSCOPIC;  Surgeon: Phuong Juan MD;  Location:  [facility];  Service: Gynecology    UTERINE FIBROID SURGERY         Family History   Problem Relation Age of Onset    Diabetes Mother     Hypertension Father     Diabetes Maternal Grandmother     Heart disease Paternal Grandmother     Ulcers Paternal Grandfather     Heart disease Family     Cancer Maternal Aunt     Cancer Paternal Aunt      I have reviewed and agree with the history as documented.    E-Cigarette/Vaping    E-Cigarette Use Never User      E-Cigarette/Vaping Substances     Social History     Tobacco Use    Smoking status: Never     Passive exposure: Never    Smokeless tobacco: Never   Vaping Use    Vaping status: Never Used   Substance Use Topics    Alcohol use: Never    Drug use: No       Review of Systems   Constitutional: Negative.  Negative for chills and fever.   HENT: Negative.  Negative for rhinorrhea, sore throat, trouble swallowing and voice change.    Eyes: Negative.  Negative for pain and visual disturbance.   Respiratory: Negative.  Negative for cough, shortness of breath and wheezing.    Cardiovascular: Negative.  Negative for chest pain and palpitations.   Gastrointestinal:  Negative for abdominal pain, diarrhea, nausea and vomiting.   Genitourinary: Negative.  Negative for dysuria and frequency.   Musculoskeletal: Negative.  Negative for neck pain and neck stiffness.   Skin: Negative.  Negative for rash.   Neurological: Negative.  Negative for dizziness, speech difficulty, weakness, light-headedness and numbness.       Physical Exam  Physical Exam  Vitals and nursing note reviewed.   Constitutional:       General: She is not in acute distress.     Appearance: She is well-developed.   HENT:      Head: Normocephalic and atraumatic.   Eyes:      Conjunctiva/sclera: Conjunctivae normal.      Pupils: Pupils are equal, round, and reactive to light.   Neck:      Trachea: No tracheal deviation.   Cardiovascular:      Rate and Rhythm: Normal rate and regular rhythm.   Pulmonary:      Effort:  Pulmonary effort is normal. No respiratory distress.      Breath sounds: Normal breath sounds. No wheezing or rales.   Abdominal:      General: Bowel sounds are normal. There is no distension.      Palpations: Abdomen is soft.      Tenderness: There is no abdominal tenderness. There is no guarding or rebound.   Musculoskeletal:         General: No tenderness or deformity. Normal range of motion.      Cervical back: Normal range of motion and neck supple.   Skin:     General: Skin is warm and dry.      Capillary Refill: Capillary refill takes less than 2 seconds.      Findings: No rash.   Neurological:      Mental Status: She is alert and oriented to person, place, and time.   Psychiatric:         Behavior: Behavior normal.         Vital Signs  ED Triage Vitals [03/04/24 1820]   Temperature Pulse Respirations Blood Pressure SpO2   98.8 °F (37.1 °C) 105 18 (!) 172/103 98 %      Temp Source Heart Rate Source Patient Position - Orthostatic VS BP Location FiO2 (%)   Tympanic Monitor Sitting Left arm --      Pain Score       --           Vitals:    03/04/24 1820   BP: (!) 172/103   Pulse: 105   Patient Position - Orthostatic VS: Sitting         Visual Acuity      ED Medications  Medications - No data to display    Diagnostic Studies  Results Reviewed       None                   No orders to display              Procedures  Procedures         ED Course                               SBIRT 20yo+      Flowsheet Row Most Recent Value   Initial Alcohol Screen: US AUDIT-C     1. How often do you have a drink containing alcohol? 0 Filed at: 03/04/2024 1830   2. How many drinks containing alcohol do you have on a typical day you are drinking?  0 Filed at: 03/04/2024 1830   3a. Male UNDER 65: How often do you have five or more drinks on one occasion? 0 Filed at: 03/04/2024 1830   3b. FEMALE Any Age, or MALE 65+: How often do you have 4 or more drinks on one occassion? 0 Filed at: 03/04/2024 1830   Audit-C Score 0 Filed at:  03/04/2024 1830   [place]: How many times in the past year have you...    Used an illegal drug or used a prescription medication for non-medical reasons? Never Filed at: 03/04/2024 1830                      Medical Decision Making  Work note given patient no acute distress suspicion for life-threatening infection or traumatic injury is low.             Disposition  Final diagnoses:   Encounter to obtain excuse from work     Time reflects when diagnosis was documented in both MDM as applicable and the Disposition within this note       Time User Action Codes Description Comment    3/4/2024  6:26 PM Sotero Gaston Add [Z02.89] Encounter to obtain excuse from work           ED Disposition       ED Disposition   Discharge    Condition   Stable    Date/Time   Mon Mar 4, 2024 1826    Comment   Carlita Cui discharge to home/self care.                   Follow-up Information       Follow up With Specialties Details Why Contact Info Additional Information    [facility] In 1 week  [address] [place] [address] [id number] [phone] [facility], [address] [place] [address], [phone] [phone]            Discharge Medication List as of 3/4/2024  6:27 PM        CONTINUE these medications which have NOT CHANGED    Details   Blood Pressure Monitoring (B-D ASSURE BPM/AUTO ARM CUFF) MISC by Does not apply route daily, Starting Wed 7/8/2020, Normal      cyanocobalamin (VITAMIN B-12) 500 mcg tablet Take 500 mcg by mouth every morning , Historical Med      glimepiride (AMARYL) 4 mg tablet Take 1 tablet (4 mg total) by mouth 2 (two) times a day, Starting Tue 3/17/2020, Normal      ibuprofen (MOTRIN) 600 mg tablet Take 1 tablet (600 mg total) by mouth every 6 (six) hours as needed for mild pain (cramping), Starting Thu 7/11/2019, Normal      lisinopril (ZESTRIL) 20 mg tablet Take 1 tablet (20 mg  total) by mouth daily, Starting Tue 3/17/2020, Normal      Multiple Vitamin (MULTIVITAMIN) capsule Take 1 capsule by mouth every morning , Historical Med      ondansetron (ZOFRAN) 4 mg tablet Take 1 tablet (4 mg total) by mouth every 12 (twelve) hours as needed for nausea or vomiting for up to 7 days, Starting Mon 7/10/2023, Until Mon 7/17/2023 at 2359, Normal      pantoprazole (PROTONIX) 20 mg tablet Take 1 tablet (20 mg total) by mouth daily, Starting Mon 7/10/2023, Until Wed 8/9/2023, Normal      sitaGLIPtin (JANUVIA) 100 mg tablet Take 1 tablet (100 mg total) by mouth every morning, Starting Wed 10/14/2020, Normal      vitamin A 2250 MCG (7500 UT) capsule Take 7,500 Units by mouth daily, Historical Med             No discharge procedures on file.    PDMP Review       None            ED Provider  Electronically Signed by             Sotero Gaston DO  03/05/24 0849

## 2024-05-28 ENCOUNTER — APPOINTMENT (EMERGENCY)
Dept: CT IMAGING | Facility: HOSPITAL | Age: 39
End: 2024-05-28
Payer: COMMERCIAL

## 2024-05-28 ENCOUNTER — HOSPITAL ENCOUNTER (EMERGENCY)
Facility: HOSPITAL | Age: 39
Discharge: HOME | End: 2024-05-28
Attending: EMERGENCY MEDICINE
Payer: COMMERCIAL

## 2024-05-28 ENCOUNTER — APPOINTMENT (EMERGENCY)
Dept: RADIOLOGY | Facility: HOSPITAL | Age: 39
End: 2024-05-28
Payer: COMMERCIAL

## 2024-05-28 VITALS
RESPIRATION RATE: 18 BRPM | SYSTOLIC BLOOD PRESSURE: 127 MMHG | HEART RATE: 102 BPM | OXYGEN SATURATION: 100 % | TEMPERATURE: 98.7 F | DIASTOLIC BLOOD PRESSURE: 86 MMHG
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
## 2024-05-28 DIAGNOSIS — R11.0 NAUSEA: ICD-10-CM

## 2024-05-28 DIAGNOSIS — S09.90XA INJURY OF HEAD, INITIAL ENCOUNTER: ICD-10-CM

## 2024-05-28 DIAGNOSIS — R51.9 HEADACHE: ICD-10-CM

## 2024-05-28 LAB
EXT PREGNANCY TEST URINE: NEGATIVE
EXT. CONTROL: NORMAL

## 2024-05-28 PROCEDURE — 99284 EMERGENCY DEPT VISIT MOD MDM: CPT

## 2024-05-28 PROCEDURE — 99284 EMERGENCY DEPT VISIT MOD MDM: CPT | Performed by: PHYSICIAN ASSISTANT

## 2024-05-28 PROCEDURE — 81025 URINE PREGNANCY TEST: CPT | Performed by: PHYSICIAN ASSISTANT

## 2024-05-28 PROCEDURE — 70450 CT HEAD/BRAIN W/O DYE: CPT

## 2024-05-28 PROCEDURE — 71045 X-RAY EXAM CHEST 1 VIEW: CPT

## 2024-05-28 RX ORDER — ONDANSETRON 4 MG/1
4 TABLET, ORALLY DISINTEGRATING ORAL EVERY 6 HOURS PRN
Qty: 20 TABLET | Refills: 0 | Status: SHIPPED | OUTPATIENT
Start: 2024-05-28

## 2024-05-28 RX ORDER — ACETAMINOPHEN 325 MG/1
975 TABLET ORAL ONCE
Status: COMPLETED | OUTPATIENT
Start: 2024-05-28 | End: 2024-05-28

## 2024-05-28 RX ORDER — ONDANSETRON 4 MG/1
4 TABLET, ORALLY DISINTEGRATING ORAL ONCE
Status: COMPLETED | OUTPATIENT
Start: 2024-05-28 | End: 2024-05-28

## 2024-05-28 RX ORDER — IBUPROFEN 600 MG/1
600 TABLET ORAL ONCE
Status: COMPLETED | OUTPATIENT
Start: 2024-05-28 | End: 2024-05-28

## 2024-05-28 RX ADMIN — IBUPROFEN 600 MG: 600 TABLET ORAL at 20:14

## 2024-05-28 RX ADMIN — ACETAMINOPHEN 975 MG: 325 TABLET, FILM COATED ORAL at 20:14

## 2024-05-28 RX ADMIN — ONDANSETRON 4 MG: 4 TABLET, ORALLY DISINTEGRATING ORAL at 20:56
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
## 2024-05-29 NOTE — DISCHARGE INSTRUCTIONS
Daily brain stimulating activities:card games, computers, puzzles, crosswords, word searches, reading, work counts for this.  - 10-30 minutes of daily, non-contact aerobic exercise activity to promote blood flow.  Walks are good.   - Remain hydrated with 64oz. of fluid daily including juice, milk, vitamin water and plenty of water.   - Plenty of rest with down time/naps. (30 minutes is the perfect nap).  - Recommend seven to eight hours of sleep each night.  - Start each day with breakfast.  - Keep eyes open while showering.  - EVERY TIME YOU CLOSE YOUR EYES for one minute YOU REST YOUR BRAIN  - Every 1/2 to close your eyes for one minute-  - Every 20 minutes on a computer screen look 20 feet in front of you for 20 seconds   - No Contact Sports  - May watch television-Close your eyes during the commercials.  - May watch movies/Netflix/Youtube for 30 minute increments and then take a break and close your eyes for 1 minute   - May use cell phone to tolerance to text/talk   - No motorcycle, dirt bike, mini bike or ATV.  - No climbing any distance off of floor or ground height.  - Protect head from injury as a concussed brain is vulnerable and a second blow could cause irreversible damage.  - Any significant blow to the head or body, please seek medical attention.  - Monitor neurologic status and report any changes/concerns about condition.

## 2024-06-03 ENCOUNTER — HOSPITAL ENCOUNTER (EMERGENCY)
Facility: HOSPITAL | Age: 39
Discharge: HOME | End: 2024-06-03
Attending: EMERGENCY MEDICINE
Payer: COMMERCIAL

## 2024-06-03 VITALS
SYSTOLIC BLOOD PRESSURE: 133 MMHG | DIASTOLIC BLOOD PRESSURE: 83 MMHG | TEMPERATURE: 98.3 F | HEART RATE: 102 BPM | OXYGEN SATURATION: 99 % | RESPIRATION RATE: 18 BRPM

## 2024-06-03 DIAGNOSIS — S06.0XAA CONCUSSION: ICD-10-CM

## 2024-06-03 DIAGNOSIS — R51.9 HEADACHE: Primary | ICD-10-CM

## 2024-06-03 DIAGNOSIS — R11.0 NAUSEA: ICD-10-CM

## 2024-06-03 PROCEDURE — 99284 EMERGENCY DEPT VISIT MOD MDM: CPT | Performed by: EMERGENCY MEDICINE

## 2024-06-03 PROCEDURE — 99283 EMERGENCY DEPT VISIT LOW MDM: CPT

## 2024-06-03 RX ORDER — NAPROXEN 250 MG/1
500 TABLET ORAL ONCE
Status: COMPLETED | OUTPATIENT
Start: 2024-06-03 | End: 2024-06-03

## 2024-06-03 RX ORDER — NAPROXEN 500 MG/1
500 TABLET ORAL 2 TIMES DAILY WITH MEALS
Qty: 30 TABLET | Refills: 0 | Status: SHIPPED | OUTPATIENT
Start: 2024-06-03

## 2024-06-03 RX ORDER — METHOCARBAMOL 500 MG/1
500 TABLET, FILM COATED ORAL 2 TIMES DAILY
Qty: 20 TABLET | Refills: 0 | Status: SHIPPED | OUTPATIENT
Start: 2024-06-03

## 2024-06-03 RX ORDER — ONDANSETRON 4 MG/1
4 TABLET, ORALLY DISINTEGRATING ORAL ONCE
Status: COMPLETED | OUTPATIENT
Start: 2024-06-03 | End: 2024-06-03

## 2024-06-03 RX ORDER — METHOCARBAMOL 500 MG/1
500 TABLET, FILM COATED ORAL ONCE
Status: COMPLETED | OUTPATIENT
Start: 2024-06-03 | End: 2024-06-03

## 2024-06-03 RX ADMIN — NAPROXEN 500 MG: 250 TABLET ORAL at 08:44

## 2024-06-03 RX ADMIN — ONDANSETRON 4 MG: 4 TABLET, ORALLY DISINTEGRATING ORAL at 08:44

## 2024-06-03 RX ADMIN — METHOCARBAMOL 500 MG: 500 TABLET ORAL at 08:44
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
## 2024-06-03 NOTE — Clinical Note
Carlita Cui was seen and treated in our emergency department on 6/3/2024.                Diagnosis:     Carlita  may return to work on return date.    She may return on this date: 06/06/2024         If you have any questions or concerns, please don't hesitate to call.      Carlita Pineda, DO    ______________________________           _______________          _______________  Hospital Representative                              Date                                Time

## 2024-06-14 ENCOUNTER — TELEPHONE (OUTPATIENT)
Dept: FAMILY MEDICINE CLINIC | Facility: CLINIC | Age: 39
End: 2024-06-14
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
## 2024-06-17 ENCOUNTER — TELEPHONE (OUTPATIENT)
Dept: FAMILY MEDICINE CLINIC | Facility: CLINIC | Age: 39
End: 2024-06-17

## 2024-06-17 NOTE — TELEPHONE ENCOUNTER
Pt lvm 6/14 stating she's calling in regards of her paper work and want to be seen . Pt left a detailed message I was unable to hear pt .     Gave a call back 6/17 lvm to call back

## 2024-06-20 ENCOUNTER — OFFICE VISIT (OUTPATIENT)
Dept: FAMILY MEDICINE CLINIC | Facility: CLINIC | Age: 39
End: 2024-06-20

## 2024-06-20 VITALS
OXYGEN SATURATION: 99 % | BODY MASS INDEX: 32.6 KG/M2 | WEIGHT: 184 LBS | HEIGHT: 63 IN | DIASTOLIC BLOOD PRESSURE: 84 MMHG | SYSTOLIC BLOOD PRESSURE: 150 MMHG | RESPIRATION RATE: 18 BRPM | HEART RATE: 105 BPM | TEMPERATURE: 98.4 F

## 2024-06-20 DIAGNOSIS — E11.9 TYPE 2 DIABETES MELLITUS WITHOUT COMPLICATION, WITHOUT LONG-TERM CURRENT USE OF INSULIN (HCC): Primary | ICD-10-CM

## 2024-06-20 DIAGNOSIS — R51.9 INTRACTABLE HEADACHE, UNSPECIFIED CHRONICITY PATTERN, UNSPECIFIED HEADACHE TYPE: ICD-10-CM

## 2024-06-20 DIAGNOSIS — I10 ESSENTIAL HYPERTENSION: ICD-10-CM

## 2024-06-20 LAB — SL AMB POCT HEMOGLOBIN AIC: 14.2 (ref ?–6.5)

## 2024-06-20 PROCEDURE — 99213 OFFICE O/P EST LOW 20 MIN: CPT | Performed by: FAMILY MEDICINE

## 2024-06-20 PROCEDURE — 83036 HEMOGLOBIN GLYCOSYLATED A1C: CPT | Performed by: FAMILY MEDICINE

## 2024-06-20 RX ORDER — ONDANSETRON 4 MG/1
4 TABLET, FILM COATED ORAL EVERY 8 HOURS PRN
Qty: 20 TABLET | Refills: 0 | Status: SHIPPED | OUTPATIENT
Start: 2024-06-20

## 2024-06-20 RX ORDER — LISINOPRIL 40 MG/1
40 TABLET ORAL DAILY
Qty: 100 TABLET | Refills: 3 | Status: SHIPPED | OUTPATIENT
Start: 2024-06-20

## 2024-06-20 RX ORDER — SENNOSIDES 8.6 MG
650 CAPSULE ORAL EVERY 8 HOURS PRN
Qty: 30 TABLET | Refills: 0 | Status: SHIPPED | OUTPATIENT
Start: 2024-06-20

## 2024-06-20 RX ORDER — IBUPROFEN 800 MG/1
800 TABLET ORAL EVERY 6 HOURS PRN
Qty: 60 TABLET | Refills: 2 | Status: SHIPPED | OUTPATIENT
Start: 2024-06-20

## 2024-06-20 RX ORDER — GLIPIZIDE 10 MG/1
10 TABLET, FILM COATED, EXTENDED RELEASE ORAL DAILY
Qty: 60 TABLET | Refills: 2 | Status: SHIPPED | OUTPATIENT
Start: 2024-06-20

## 2024-06-21 PROBLEM — I10 ESSENTIAL HYPERTENSION: Status: ACTIVE | Noted: 2024-06-21
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
## 2024-06-21 NOTE — ASSESSMENT & PLAN NOTE
BP Readings from Last 3 Encounters:   06/20/24 150/84   06/03/24 133/83   05/28/24 127/86       Not at goal.  Will  initiate therapy with lisinopril 40mg daily.

## 2024-06-21 NOTE — ASSESSMENT & PLAN NOTE
Lab Results   Component Value Date    HGBA1C 14.2 (A) 06/20/2024   Patient was previously on glimepiride, and Januvia.  Discontinued both medication.  Will initiate therapy with Ozempic as well as glipizide 10 mg XL 24 hours.  Will have patient follow-up in a week for random sugar.  Patient has been off her medication and states that this is the reason for her elevated A1c.  She denies any polyuria, polydipsia.  Will also obtain lab workup at this time.

## 2024-06-28 ENCOUNTER — EVALUATION (OUTPATIENT)
Dept: PHYSICAL THERAPY | Facility: CLINIC | Age: 39
End: 2024-06-28
Payer: COMMERCIAL

## 2024-06-28 VITALS — DIASTOLIC BLOOD PRESSURE: 100 MMHG | SYSTOLIC BLOOD PRESSURE: 180 MMHG

## 2024-06-28 DIAGNOSIS — S06.0X0D CONCUSSION WITHOUT LOSS OF CONSCIOUSNESS, SUBSEQUENT ENCOUNTER: Primary | ICD-10-CM

## 2024-06-28 DIAGNOSIS — S06.0XAA CONCUSSION: ICD-10-CM

## 2024-06-28 PROCEDURE — 97140 MANUAL THERAPY 1/> REGIONS: CPT | Performed by: PHYSICAL THERAPIST

## 2024-06-28 PROCEDURE — 97112 NEUROMUSCULAR REEDUCATION: CPT | Performed by: PHYSICAL THERAPIST

## 2024-06-28 PROCEDURE — 97162 PT EVAL MOD COMPLEX 30 MIN: CPT | Performed by: PHYSICAL THERAPIST
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
## 2024-07-10 ENCOUNTER — TELEPHONE (OUTPATIENT)
Dept: FAMILY MEDICINE CLINIC | Facility: CLINIC | Age: 39
End: 2024-07-10

## 2024-07-10 NOTE — TELEPHONE ENCOUNTER
Message left on machine for patient to return call to reschedule appointment on 7/24, provider nto available at the time patient is scheduled. If/when patient calls back, please offer appointments on hold. Thank you.

## 2024-07-11 ENCOUNTER — OFFICE VISIT (OUTPATIENT)
Dept: PHYSICAL THERAPY | Facility: CLINIC | Age: 39
End: 2024-07-11
Payer: COMMERCIAL

## 2024-07-11 DIAGNOSIS — S06.0X0D CONCUSSION WITHOUT LOSS OF CONSCIOUSNESS, SUBSEQUENT ENCOUNTER: Primary | ICD-10-CM

## 2024-07-11 PROCEDURE — 97140 MANUAL THERAPY 1/> REGIONS: CPT | Performed by: PHYSICAL THERAPIST

## 2024-07-11 PROCEDURE — 97110 THERAPEUTIC EXERCISES: CPT | Performed by: PHYSICAL THERAPIST

## 2024-07-11 PROCEDURE — 97112 NEUROMUSCULAR REEDUCATION: CPT | Performed by: PHYSICAL THERAPIST
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
## 2024-07-16 ENCOUNTER — APPOINTMENT (OUTPATIENT)
Dept: PHYSICAL THERAPY | Facility: CLINIC | Age: 39
End: 2024-07-16
Payer: COMMERCIAL

## 2024-07-18 ENCOUNTER — APPOINTMENT (OUTPATIENT)
Dept: PHYSICAL THERAPY | Facility: CLINIC | Age: 39
End: 2024-07-18
Payer: COMMERCIAL

## 2024-08-06 ENCOUNTER — OFFICE VISIT (OUTPATIENT)
Dept: PHYSICAL THERAPY | Facility: CLINIC | Age: 39
End: 2024-08-06
Payer: COMMERCIAL

## 2024-08-06 DIAGNOSIS — S06.0X0D CONCUSSION WITHOUT LOSS OF CONSCIOUSNESS, SUBSEQUENT ENCOUNTER: Primary | ICD-10-CM

## 2024-08-06 PROCEDURE — 97112 NEUROMUSCULAR REEDUCATION: CPT | Performed by: PHYSICAL THERAPIST

## 2024-08-06 PROCEDURE — 97110 THERAPEUTIC EXERCISES: CPT | Performed by: PHYSICAL THERAPIST

## 2024-08-06 PROCEDURE — 97140 MANUAL THERAPY 1/> REGIONS: CPT | Performed by: PHYSICAL THERAPIST
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
## 2024-08-27 PROBLEM — Z59.89 DOES NOT HAVE HEALTH INSURANCE: Status: ACTIVE | Noted: 2024-08-27

## 2024-08-27 PROBLEM — Z59.71 DOES NOT HAVE HEALTH INSURANCE: Status: ACTIVE | Noted: 2024-08-27

## 2024-08-27 PROBLEM — E11.9 TYPE 2 DIABETES MELLITUS (HCC): Status: ACTIVE | Noted: 2018-11-20

## 2024-09-05 ENCOUNTER — OFFICE VISIT (OUTPATIENT)
Dept: FAMILY MEDICINE CLINIC | Facility: CLINIC | Age: 39
End: 2024-09-05

## 2024-09-05 VITALS
BODY MASS INDEX: 31.15 KG/M2 | RESPIRATION RATE: 16 BRPM | WEIGHT: 175.8 LBS | HEIGHT: 63 IN | HEART RATE: 100 BPM | DIASTOLIC BLOOD PRESSURE: 80 MMHG | SYSTOLIC BLOOD PRESSURE: 140 MMHG | OXYGEN SATURATION: 98 % | TEMPERATURE: 97.3 F

## 2024-09-05 DIAGNOSIS — Z11.4 SCREENING FOR HIV (HUMAN IMMUNODEFICIENCY VIRUS): ICD-10-CM

## 2024-09-05 DIAGNOSIS — I10 ESSENTIAL HYPERTENSION: ICD-10-CM

## 2024-09-05 DIAGNOSIS — Z59.89 DOES NOT HAVE HEALTH INSURANCE: ICD-10-CM

## 2024-09-05 DIAGNOSIS — S06.0X0D CONCUSSION WITHOUT LOSS OF CONSCIOUSNESS, SUBSEQUENT ENCOUNTER: Primary | ICD-10-CM

## 2024-09-05 DIAGNOSIS — E11.9 TYPE 2 DIABETES MELLITUS WITHOUT COMPLICATION, WITHOUT LONG-TERM CURRENT USE OF INSULIN (HCC): ICD-10-CM

## 2024-09-05 DIAGNOSIS — Z11.59 ENCOUNTER FOR HEPATITIS C SCREENING TEST FOR LOW RISK PATIENT: ICD-10-CM

## 2024-09-05 PROBLEM — S06.0X0A CONCUSSION WITH NO LOSS OF CONSCIOUSNESS: Status: ACTIVE | Noted: 2024-09-05

## 2024-09-05 PROBLEM — E66.812 CLASS 2 SEVERE OBESITY WITH SERIOUS COMORBIDITY AND BODY MASS INDEX (BMI) OF 36.0 TO 36.9 IN ADULT (HCC): Status: RESOLVED | Noted: 2019-05-23 | Resolved: 2024-09-05

## 2024-09-05 PROBLEM — Z59.71 DOES NOT HAVE HEALTH INSURANCE: Status: RESOLVED | Noted: 2024-08-27 | Resolved: 2024-09-05

## 2024-09-05 PROBLEM — E66.01 CLASS 2 SEVERE OBESITY WITH SERIOUS COMORBIDITY AND BODY MASS INDEX (BMI) OF 36.0 TO 36.9 IN ADULT (HCC): Status: RESOLVED | Noted: 2019-05-23 | Resolved: 2024-09-05

## 2024-09-05 PROCEDURE — 99213 OFFICE O/P EST LOW 20 MIN: CPT | Performed by: FAMILY MEDICINE

## 2024-09-05 PROCEDURE — 3079F DIAST BP 80-89 MM HG: CPT | Performed by: FAMILY MEDICINE

## 2024-09-05 PROCEDURE — 3077F SYST BP >= 140 MM HG: CPT | Performed by: FAMILY MEDICINE

## 2024-09-05 SDOH — ECONOMIC STABILITY - INCOME SECURITY: OTHER PROBLEMS RELATED TO HOUSING AND ECONOMIC CIRCUMSTANCES: Z59.89
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
## 2024-09-05 NOTE — ASSESSMENT & PLAN NOTE
Lab Results   Component Value Date    HGBA1C 14.2 (A) 06/20/2024   Last A1c: 14.2     Current regimen:   semaglutide weekly .5 , glipazide 10 mg daily,   Currently uncontrolled, adherent     Plan:   Continue current regimen  DM autoantibody testing   FU in 1 month for DFE, HBA1c , IRIS

## 2024-09-05 NOTE — ASSESSMENT & PLAN NOTE
Controlled for age   Current medications: lisinopril 40 mg daily   Compliant     Plan:   Continue current regimen  Patient instructed to decrease consumption of fried/process/ fast foods and increase whole food intake   Goal of 3-5 servings of vegetables per day   Minimize salt to < 2g per day   Portion control   Goal of exercise 150 minutes per week of exercise, or 30 minute of brisk exercise 5x/ week but increase activity slowly (SMART goal)  to get to goal.

## 2024-09-05 NOTE — ASSESSMENT & PLAN NOTE
Current exercise:  Current diet:     Plan:   Patient instructed to decrease consumption of fried/process/ fast foods and increase whole food intake   Decrease intake of sugary drinks and sodas   Goal of 3-5 servings of vegetables per day   Portion control   Goal of exercise 150 minutes per week of exercise, or 30 minute of brisk exercise 5x/ week but increase activity slowly (SMART goal)  to get to goal.
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
## 2024-09-09 NOTE — PATIENT INSTRUCTIONS
Patient Instructions:    Additional Testing  -You are not in need of cerebrovascular imaging at this time.     Headache/migraine treatment:     Prevention  -To take every day to help prevent headaches - not to take at the time of headache:  -We will hold off on a daily preventative at this time    Abortive  -For immediate treatment of a headache/migraine  -For your more moderate to severe migraines take this medication as early as possible:  -Start Imitrex 100 mg at the earliest onset of migraine.  May repeat again in 2 hours if not completely headache free  -It is ok to take ibuprofen, acetaminophen or naproxen (Advil, Tylenol,  Aleve, Excedrin) if they help your headaches you should limit these to No more than 3 times a week to avoid medication overuse/rebound headaches.     Bridge:  -Depakote 500 mg x 5 days    Rescue:  -Toradol injection given in office today    Lifestyle Recommendations:    -Remain well-hydrated drinking at least 48 to 64 ounces of noncaffeinated beverages per day in addition to anything caffeinated.    -Getting adequate rest is also very important for migraine prevention (aim for 7-8 hours per night).     -Regular exercise is also beneficial for headache prevention.  I would encourage at the least 5 days of physical exercise weekly for at least 30 minutes.   -I would like for them to keep track of their migraines using an application on their phone or calendar as they see fit. Phone applications: Migraine Silas or Migraine Diary.    Education and Follow-up:    -Please call or send a [employer] message with any questions or concerns. Please present to the emergency room with any concerning symptoms such as: worst headache of your life, sudden painless loss of vision or double vision, difficulty speaking or swallowing, vertigo/room spinning that does not quickly resolve, or weakness/numbness/loss of coordination affecting 1 side of the face or body.  -Follow up in 2 months or sooner if needed.

## 2024-09-10 ENCOUNTER — TELEPHONE (OUTPATIENT)
Age: 39
End: 2024-09-10

## 2024-09-10 ENCOUNTER — CONSULT (OUTPATIENT)
Dept: NEUROLOGY | Facility: CLINIC | Age: 39
End: 2024-09-10
Payer: COMMERCIAL

## 2024-09-10 ENCOUNTER — APPOINTMENT (OUTPATIENT)
Dept: LAB | Facility: CLINIC | Age: 39
End: 2024-09-10
Payer: COMMERCIAL

## 2024-09-10 VITALS
SYSTOLIC BLOOD PRESSURE: 157 MMHG | WEIGHT: 173 LBS | HEIGHT: 62 IN | HEART RATE: 116 BPM | BODY MASS INDEX: 31.83 KG/M2 | DIASTOLIC BLOOD PRESSURE: 88 MMHG | TEMPERATURE: 97.7 F

## 2024-09-10 DIAGNOSIS — I10 ESSENTIAL HYPERTENSION: ICD-10-CM

## 2024-09-10 DIAGNOSIS — E11.9 TYPE 2 DIABETES MELLITUS WITHOUT COMPLICATION, WITHOUT LONG-TERM CURRENT USE OF INSULIN (HCC): ICD-10-CM

## 2024-09-10 DIAGNOSIS — F07.81 POST CONCUSSION SYNDROME: ICD-10-CM

## 2024-09-10 DIAGNOSIS — G43.109 MIGRAINE WITH AURA AND WITHOUT STATUS MIGRAINOSUS, NOT INTRACTABLE: Primary | ICD-10-CM

## 2024-09-10 DIAGNOSIS — S06.0X0D CONCUSSION WITHOUT LOSS OF CONSCIOUSNESS, SUBSEQUENT ENCOUNTER: ICD-10-CM

## 2024-09-10 LAB
25(OH)D3 SERPL-MCNC: 17.5 NG/ML (ref 30–100)
ALBUMIN SERPL BCG-MCNC: 4 G/DL (ref 3.5–5)
ALP SERPL-CCNC: 53 U/L (ref 34–104)
ALT SERPL W P-5'-P-CCNC: 10 U/L (ref 7–52)
ANION GAP SERPL CALCULATED.3IONS-SCNC: 9 MMOL/L (ref 4–13)
AST SERPL W P-5'-P-CCNC: 13 U/L (ref 13–39)
BASOPHILS # BLD AUTO: 0.04 THOUSANDS/ΜL (ref 0–0.1)
BASOPHILS NFR BLD AUTO: 1 % (ref 0–1)
BILIRUB SERPL-MCNC: 0.37 MG/DL (ref 0.2–1)
BUN SERPL-MCNC: 9 MG/DL (ref 5–25)
CALCIUM SERPL-MCNC: 9.4 MG/DL (ref 8.4–10.2)
CHLORIDE SERPL-SCNC: 100 MMOL/L (ref 96–108)
CHOLEST SERPL-MCNC: 178 MG/DL
CO2 SERPL-SCNC: 28 MMOL/L (ref 21–32)
CREAT SERPL-MCNC: 0.86 MG/DL (ref 0.6–1.3)
CREAT UR-MCNC: 161.1 MG/DL
EOSINOPHIL # BLD AUTO: 0.04 THOUSAND/ΜL (ref 0–0.61)
EOSINOPHIL NFR BLD AUTO: 1 % (ref 0–6)
ERYTHROCYTE [DISTWIDTH] IN BLOOD BY AUTOMATED COUNT: 12.7 % (ref 11.6–15.1)
GFR SERPL CREATININE-BSD FRML MDRD: 85 ML/MIN/1.73SQ M
GLUCOSE P FAST SERPL-MCNC: 177 MG/DL (ref 65–99)
HCT VFR BLD AUTO: 34.3 % (ref 34.8–46.1)
HDLC SERPL-MCNC: 45 MG/DL
HGB BLD-MCNC: 11.3 G/DL (ref 11.5–15.4)
IMM GRANULOCYTES # BLD AUTO: 0.01 THOUSAND/UL (ref 0–0.2)
IMM GRANULOCYTES NFR BLD AUTO: 0 % (ref 0–2)
INSULIN SERPL-ACNC: 10.03 UIU/ML (ref 1.9–23)
LDLC SERPL CALC-MCNC: 116 MG/DL (ref 0–100)
LYMPHOCYTES # BLD AUTO: 1.55 THOUSANDS/ΜL (ref 0.6–4.47)
LYMPHOCYTES NFR BLD AUTO: 27 % (ref 14–44)
MCH RBC QN AUTO: 30.5 PG (ref 26.8–34.3)
MCHC RBC AUTO-ENTMCNC: 32.9 G/DL (ref 31.4–37.4)
MCV RBC AUTO: 93 FL (ref 82–98)
MICROALBUMIN UR-MCNC: 56.6 MG/L
MICROALBUMIN/CREAT 24H UR: 35 MG/G CREATININE (ref 0–30)
MONOCYTES # BLD AUTO: 0.44 THOUSAND/ΜL (ref 0.17–1.22)
MONOCYTES NFR BLD AUTO: 8 % (ref 4–12)
NEUTROPHILS # BLD AUTO: 3.64 THOUSANDS/ΜL (ref 1.85–7.62)
NEUTS SEG NFR BLD AUTO: 63 % (ref 43–75)
NONHDLC SERPL-MCNC: 133 MG/DL
NRBC BLD AUTO-RTO: 0 /100 WBCS
PLATELET # BLD AUTO: 602 THOUSANDS/UL (ref 149–390)
PMV BLD AUTO: 9.2 FL (ref 8.9–12.7)
POTASSIUM SERPL-SCNC: 3.8 MMOL/L (ref 3.5–5.3)
PROT SERPL-MCNC: 7.6 G/DL (ref 6.4–8.4)
RBC # BLD AUTO: 3.71 MILLION/UL (ref 3.81–5.12)
SODIUM SERPL-SCNC: 137 MMOL/L (ref 135–147)
T4 FREE SERPL-MCNC: 0.75 NG/DL (ref 0.61–1.12)
TRIGL SERPL-MCNC: 83 MG/DL
TSH SERPL DL<=0.05 MIU/L-ACNC: 1.6 UIU/ML (ref 0.45–4.5)
VIT B12 SERPL-MCNC: 296 PG/ML (ref 180–914)
WBC # BLD AUTO: 5.72 THOUSAND/UL (ref 4.31–10.16)

## 2024-09-10 PROCEDURE — 82043 UR ALBUMIN QUANTITATIVE: CPT

## 2024-09-10 PROCEDURE — 82607 VITAMIN B-12: CPT

## 2024-09-10 PROCEDURE — 36415 COLL VENOUS BLD VENIPUNCTURE: CPT

## 2024-09-10 PROCEDURE — 96372 THER/PROPH/DIAG INJ SC/IM: CPT

## 2024-09-10 PROCEDURE — 82306 VITAMIN D 25 HYDROXY: CPT

## 2024-09-10 PROCEDURE — 85025 COMPLETE CBC W/AUTO DIFF WBC: CPT

## 2024-09-10 PROCEDURE — 83519 RIA NONANTIBODY: CPT

## 2024-09-10 PROCEDURE — 83525 ASSAY OF INSULIN: CPT

## 2024-09-10 PROCEDURE — 84439 ASSAY OF FREE THYROXINE: CPT

## 2024-09-10 PROCEDURE — 80061 LIPID PANEL: CPT

## 2024-09-10 PROCEDURE — 99204 OFFICE O/P NEW MOD 45 MIN: CPT

## 2024-09-10 PROCEDURE — 86341 ISLET CELL ANTIBODY: CPT

## 2024-09-10 PROCEDURE — 80053 COMPREHEN METABOLIC PANEL: CPT

## 2024-09-10 PROCEDURE — 82570 ASSAY OF URINE CREATININE: CPT

## 2024-09-10 PROCEDURE — 84443 ASSAY THYROID STIM HORMONE: CPT

## 2024-09-10 RX ORDER — KETOROLAC TROMETHAMINE 30 MG/ML
60 INJECTION, SOLUTION INTRAMUSCULAR; INTRAVENOUS ONCE
Status: COMPLETED | OUTPATIENT
Start: 2024-09-10 | End: 2024-09-10

## 2024-09-10 RX ORDER — SUMATRIPTAN 100 MG/1
TABLET, FILM COATED ORAL
Qty: 12 TABLET | Refills: 3 | Status: SHIPPED | OUTPATIENT
Start: 2024-09-10

## 2024-09-10 RX ORDER — DIVALPROEX SODIUM 500 MG/1
500 TABLET, DELAYED RELEASE ORAL DAILY
Qty: 5 TABLET | Refills: 0 | Status: SHIPPED | OUTPATIENT
Start: 2024-09-10 | End: 2024-09-15

## 2024-09-10 RX ADMIN — KETOROLAC TROMETHAMINE 60 MG: 30 INJECTION, SOLUTION INTRAMUSCULAR; INTRAVENOUS at 15:37

## 2024-09-10 NOTE — TELEPHONE ENCOUNTER
Was calling pt in regards to routine referral and adding to proper wait list. LVM for pt to contact intake dept.     First attempt

## 2024-09-10 NOTE — PROGRESS NOTES
Review of Systems   Constitutional:  Negative for appetite change and fever.   HENT: Negative.  Negative for hearing loss, tinnitus, trouble swallowing and voice change.         Phonophobia   Eyes:  Positive for photophobia. Negative for pain.   Respiratory: Negative.  Negative for shortness of breath.    Cardiovascular: Negative.  Negative for palpitations.   Gastrointestinal:  Positive for nausea. Negative for vomiting.   Endocrine: Negative.  Negative for cold intolerance.   Genitourinary: Negative.  Negative for dysuria, frequency and urgency.   Musculoskeletal: Negative.  Negative for myalgias and neck pain.   Skin: Negative.  Negative for rash.   Neurological:  Positive for dizziness and headaches. Negative for tremors, seizures, syncope, facial asymmetry, speech difficulty, weakness, light-headedness and numbness.   Hematological: Negative.  Does not bruise/bleed easily.   Psychiatric/Behavioral:  Positive for decreased concentration and sleep disturbance. Negative for confusion and hallucinations.
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
## 2024-09-12 ENCOUNTER — TELEPHONE (OUTPATIENT)
Dept: FAMILY MEDICINE CLINIC | Facility: CLINIC | Age: 39
End: 2024-09-12

## 2024-09-13 ENCOUNTER — TELEPHONE (OUTPATIENT)
Age: 39
End: 2024-09-13

## 2024-09-13 LAB — GAD65 AB SER-ACNC: <5 U/ML (ref 0–5)

## 2024-09-13 NOTE — TELEPHONE ENCOUNTER
Contacted Pt. in regards to ROUTINE Referral, LVM to contact [phone] to discuss services needed at this time in order to be added to proper wait list.

## 2024-09-13 NOTE — TELEPHONE ENCOUNTER
Called patient regarding FMLA forms. Form had been completed, and placed in completed forms bin for patient to . Forms are missing several of the patient's signatures. Patient was grateful and understood that she will need to fax them after completing. Reminded of her appointment with me on 10/04.

## 2024-09-16 ENCOUNTER — TELEPHONE (OUTPATIENT)
Age: 39
End: 2024-09-16

## 2024-09-16 NOTE — TELEPHONE ENCOUNTER
Contacted Pt. in regards to ROUTINE Referral, LVM to contact [phone] to discuss services needed at this time in order to be added to proper wait list.

## 2024-09-21 LAB — ISLET CELL512 AB SER-ACNC: <7.5 U/ML

## 2024-09-27 LAB — ZNT8 AB SERPL IA-ACNC: <15 U/ML

## 2024-10-04 ENCOUNTER — OFFICE VISIT (OUTPATIENT)
Dept: FAMILY MEDICINE CLINIC | Facility: CLINIC | Age: 39
End: 2024-10-04

## 2024-10-04 VITALS
BODY MASS INDEX: 32.94 KG/M2 | TEMPERATURE: 98 F | DIASTOLIC BLOOD PRESSURE: 70 MMHG | OXYGEN SATURATION: 92 % | HEART RATE: 111 BPM | RESPIRATION RATE: 16 BRPM | WEIGHT: 179 LBS | HEIGHT: 62 IN | SYSTOLIC BLOOD PRESSURE: 132 MMHG

## 2024-10-04 DIAGNOSIS — E11.9 TYPE 2 DIABETES MELLITUS WITHOUT COMPLICATION, WITHOUT LONG-TERM CURRENT USE OF INSULIN (HCC): ICD-10-CM

## 2024-10-04 DIAGNOSIS — E55.9 VITAMIN D DEFICIENCY: ICD-10-CM

## 2024-10-04 DIAGNOSIS — Z12.4 SCREENING FOR CERVICAL CANCER: ICD-10-CM

## 2024-10-04 DIAGNOSIS — Z11.59 NEED FOR HEPATITIS C SCREENING TEST: ICD-10-CM

## 2024-10-04 DIAGNOSIS — G43.109 MIGRAINE WITH AURA AND WITHOUT STATUS MIGRAINOSUS, NOT INTRACTABLE: Primary | ICD-10-CM

## 2024-10-04 DIAGNOSIS — Z11.4 SCREENING FOR HIV (HUMAN IMMUNODEFICIENCY VIRUS): ICD-10-CM

## 2024-10-04 DIAGNOSIS — S06.0X0D CONCUSSION WITHOUT LOSS OF CONSCIOUSNESS, SUBSEQUENT ENCOUNTER: ICD-10-CM

## 2024-10-04 LAB — SL AMB POCT HEMOGLOBIN AIC: 6.8 (ref ?–6.5)

## 2024-10-04 PROCEDURE — 99213 OFFICE O/P EST LOW 20 MIN: CPT | Performed by: FAMILY MEDICINE

## 2024-10-04 PROCEDURE — 83036 HEMOGLOBIN GLYCOSYLATED A1C: CPT | Performed by: FAMILY MEDICINE

## 2024-10-04 NOTE — LETTER
October 4, 2024     Patient: Carlita Cui  YOB: 1985  Date of Visit: 10/4/2024      To Whom it May Concern:    Carlita Cui is under my professional care. Carlita was seen in my office on 10/4/2024. Carlita, her condition is improved in terms of her dizziness, and migraine frequency is more controlled. Patient is safe to pursue employment opportunities     If you have any questions or concerns, please don't hesitate to call.         Sincerely,          Melany Johansen MD        CC: No Recipients
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
## 2024-10-04 NOTE — ASSESSMENT & PLAN NOTE
Lab Results   Component Value Date    HGBA1C 6.8 (A) 10/04/2024   Last A1c: 14.2     Ozempic mg  .5mg weekly   No signs or symptoms of hypoglycemic episodes, no new changes in vision    Plan:   FU in 3 months  Continue glipizide 10 mg daily, due to risk of hypoglycemia    Orders:    POCT hemoglobin A1c    Ambulatory Referral to Ophthalmology; Future

## 2024-10-06 RX ORDER — CHOLECALCIFEROL (VITAMIN D3) 25 MCG
TABLET ORAL
Qty: 90 TABLET | Refills: 1 | Status: SHIPPED | OUTPATIENT
Start: 2024-10-06

## 2024-10-06 NOTE — PROGRESS NOTES
Called patient[facility] and left voicemail with callback number regarding insurance not covering the vitamin D, most likely due to it being over the counter. Patient will need to take it over the counter, if possible. Also discussed discontinuing glipizide 10 mg daily. Will message clinical to reach out to patient as well.     Plan:   2,000U vitamin D over the counter daily  D/C glipizide   FU in 3 months for DM ( January 2025)

## 2024-10-22 DIAGNOSIS — E11.9 TYPE 2 DIABETES MELLITUS WITHOUT COMPLICATION, WITHOUT LONG-TERM CURRENT USE OF INSULIN (HCC): ICD-10-CM

## 2024-10-22 DIAGNOSIS — I10 ESSENTIAL HYPERTENSION: ICD-10-CM

## 2024-10-23 NOTE — TELEPHONE ENCOUNTER
"Pt called requesting to sent these (pended) prescriptions to her pharmacy and to switch both to a supply of 90 days.   Pt states that their insurance is giving her trouble with these prescriptions and was told that a 90 day supply could work better for her.     Pt states they are in \"dire\" need and requests a call back.     Please Advise,   Thank you!   "

## 2024-10-24 ENCOUNTER — TELEPHONE (OUTPATIENT)
Dept: FAMILY MEDICINE CLINIC | Facility: CLINIC | Age: 39
End: 2024-10-24

## 2024-10-24 ENCOUNTER — APPOINTMENT (OUTPATIENT)
Dept: LAB | Facility: CLINIC | Age: 39
End: 2024-10-24
Payer: COMMERCIAL

## 2024-10-24 DIAGNOSIS — Z11.59 NEED FOR HEPATITIS C SCREENING TEST: ICD-10-CM

## 2024-10-24 DIAGNOSIS — Z11.4 SCREENING FOR HIV (HUMAN IMMUNODEFICIENCY VIRUS): ICD-10-CM

## 2024-10-24 LAB — HCV AB SER QL: NORMAL

## 2024-10-24 PROCEDURE — 36415 COLL VENOUS BLD VENIPUNCTURE: CPT

## 2024-10-24 PROCEDURE — 86803 HEPATITIS C AB TEST: CPT

## 2024-10-24 PROCEDURE — 87389 HIV-1 AG W/HIV-1&-2 AB AG IA: CPT

## 2024-10-24 NOTE — TELEPHONE ENCOUNTER
Pt came into office stating she needs a prior authorization for the following medications:      semaglutide, 0.25 or 0.5 mg/dose, (Ozempic, 0.25 or 0.5 MG/DOSE,) 2 mg/3 mL injection pen     lisinopril (ZESTRIL) 40 mg tablet     Please advise

## 2024-10-25 LAB
HIV 1+2 AB+HIV1 P24 AG SERPL QL IA: NORMAL
HIV 2 AB SERPL QL IA: NORMAL
HIV1 AB SERPL QL IA: NORMAL
HIV1 P24 AG SERPL QL IA: NORMAL

## 2024-10-28 DIAGNOSIS — E11.9 TYPE 2 DIABETES MELLITUS WITHOUT COMPLICATION, WITHOUT LONG-TERM CURRENT USE OF INSULIN (HCC): ICD-10-CM

## 2024-10-28 RX ORDER — LISINOPRIL 40 MG/1
40 TABLET ORAL DAILY
Qty: 90 TABLET | Refills: 0 | Status: SHIPPED | OUTPATIENT
Start: 2024-10-28 | End: 2025-01-26

## 2024-10-28 NOTE — TELEPHONE ENCOUNTER
I have sent a 90 day supply for both medications. Please let her know they have been sent to her [facility] [address].

## 2024-10-30 RX ORDER — SEMAGLUTIDE 0.68 MG/ML
INJECTION, SOLUTION SUBCUTANEOUS
Qty: 3 ML | Refills: 0 | Status: SHIPPED | OUTPATIENT
Start: 2024-10-30

## 2024-11-08 DIAGNOSIS — E11.9 TYPE 2 DIABETES MELLITUS WITHOUT COMPLICATION, WITHOUT LONG-TERM CURRENT USE OF INSULIN (HCC): ICD-10-CM

## 2024-11-11 RX ORDER — SEMAGLUTIDE 0.68 MG/ML
INJECTION, SOLUTION SUBCUTANEOUS
OUTPATIENT
Start: 2024-11-11
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
## 2024-11-13 NOTE — PATIENT INSTRUCTIONS
Patient Instructions:    Additional Testing  -You are not in need of cerebrovascular imaging at this time.     Headache/migraine treatment:     Prevention  -To take every day to help prevent headaches - not to take at the time of headache:  -We will hold off on a daily preventative at this time    Abortive  -For immediate treatment of a headache/migraine  -For your more moderate to severe migraines take this medication as early as possible:  -Continue Imitrex 100 mg at the earliest onset of migraine.  May repeat again in 2 hours if not completely headache free  -It is ok to take ibuprofen, acetaminophen or naproxen (Advil, Tylenol,  Aleve, Excedrin) if they help your headaches you should limit these to No more than 3 times a week to avoid medication overuse/rebound headaches.     Bridge:  -Depakote 500 mg x 5 days    Rescue:  -Toradol injection given in office today    Lifestyle Recommendations:    -Remain well-hydrated drinking at least 48 to 64 ounces of noncaffeinated beverages per day in addition to anything caffeinated.    -Getting adequate rest is also very important for migraine prevention (aim for 7-8 hours per night).     -Regular exercise is also beneficial for headache prevention.  I would encourage at the least 5 days of physical exercise weekly for at least 30 minutes.   -I would like for them to keep track of their migraines using an application on their phone or calendar as they see fit. Phone applications: Migraine Silas or Migraine Diary.    Education and Follow-up:    -Please call or send a [facility] message with any questions or concerns. Please present to the emergency room with any concerning symptoms such as: worst headache of your life, sudden painless loss of vision or double vision, difficulty speaking or swallowing, vertigo/room spinning that does not quickly resolve, or weakness/numbness/loss of coordination affecting 1 side of the face or body.  -Follow up in 2 months or sooner if needed.

## 2024-11-14 ENCOUNTER — OFFICE VISIT (OUTPATIENT)
Dept: NEUROLOGY | Facility: CLINIC | Age: 39
End: 2024-11-14
Payer: COMMERCIAL

## 2024-11-14 VITALS
TEMPERATURE: 97.4 F | HEART RATE: 109 BPM | OXYGEN SATURATION: 98 % | BODY MASS INDEX: 32.83 KG/M2 | HEIGHT: 62 IN | DIASTOLIC BLOOD PRESSURE: 80 MMHG | WEIGHT: 178.4 LBS | SYSTOLIC BLOOD PRESSURE: 138 MMHG

## 2024-11-14 DIAGNOSIS — G43.109 MIGRAINE WITH AURA AND WITHOUT STATUS MIGRAINOSUS, NOT INTRACTABLE: Primary | ICD-10-CM

## 2024-11-14 PROCEDURE — 99214 OFFICE O/P EST MOD 30 MIN: CPT

## 2024-11-14 RX ORDER — TOPIRAMATE 25 MG/1
TABLET, FILM COATED ORAL
Qty: 120 TABLET | Refills: 0 | Status: SHIPPED | OUTPATIENT
Start: 2024-11-14

## 2024-11-14 NOTE — PROGRESS NOTES
Review of Systems   Constitutional:  Negative for appetite change, fatigue and fever.   HENT: Negative.  Negative for hearing loss, tinnitus, trouble swallowing and voice change.    Eyes:  Positive for visual disturbance (light onset ( not as bad)). Negative for photophobia and pain.   Respiratory: Negative.  Negative for shortness of breath.    Cardiovascular: Negative.  Negative for palpitations.   Gastrointestinal: Negative.  Negative for nausea and vomiting.   Endocrine: Negative.  Negative for cold intolerance.   Genitourinary: Negative.  Negative for dysuria, frequency and urgency.   Musculoskeletal:  Negative for back pain, gait problem, myalgias, neck pain and neck stiffness.   Skin: Negative.  Negative for rash.   Allergic/Immunologic: Negative.    Neurological:  Positive for headaches (Migraine 2 times daily ( less intensity ) less pain). Negative for dizziness, tremors, seizures, syncope, facial asymmetry, speech difficulty, weakness, light-headedness and numbness.   Hematological: Negative.  Does not bruise/bleed easily.   Psychiatric/Behavioral: Negative.  Negative for confusion, hallucinations and sleep disturbance.    All other systems reviewed and are negative.
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
## 2024-12-04 DIAGNOSIS — E55.9 VITAMIN D DEFICIENCY: ICD-10-CM

## 2024-12-04 RX ORDER — CHOLECALCIFEROL (VITAMIN D3) 25 MCG
TABLET ORAL
Qty: 180 TABLET | Refills: 1 | Status: SHIPPED | OUTPATIENT
Start: 2024-12-04

## 2024-12-11 ENCOUNTER — TELEPHONE (OUTPATIENT)
Dept: FAMILY MEDICINE CLINIC | Facility: CLINIC | Age: 39
End: 2024-12-11

## 2024-12-17 ENCOUNTER — VBI (OUTPATIENT)
Dept: ADMINISTRATIVE | Facility: OTHER | Age: 39
End: 2024-12-17

## 2024-12-17 NOTE — TELEPHONE ENCOUNTER
12/17/24 1:15 PM     Chart reviewed for Pap Smear (HPV) aka Cervical Cancer Screening ; nothing is submitted to the patient's insurance at this time.     Liban Barton MA   [facility]

## 2024-12-24 DIAGNOSIS — E11.9 TYPE 2 DIABETES MELLITUS WITHOUT COMPLICATION, WITHOUT LONG-TERM CURRENT USE OF INSULIN (HCC): ICD-10-CM

## 2025-01-01 DIAGNOSIS — E11.9 TYPE 2 DIABETES MELLITUS WITHOUT COMPLICATION, WITHOUT LONG-TERM CURRENT USE OF INSULIN (HCC): ICD-10-CM

## 2025-01-03 ENCOUNTER — OFFICE VISIT (OUTPATIENT)
Dept: FAMILY MEDICINE CLINIC | Facility: CLINIC | Age: 40
End: 2025-01-03

## 2025-01-03 VITALS
RESPIRATION RATE: 14 BRPM | HEIGHT: 62 IN | BODY MASS INDEX: 32.68 KG/M2 | HEART RATE: 102 BPM | TEMPERATURE: 96.8 F | OXYGEN SATURATION: 100 % | SYSTOLIC BLOOD PRESSURE: 136 MMHG | WEIGHT: 177.6 LBS | DIASTOLIC BLOOD PRESSURE: 74 MMHG

## 2025-01-03 DIAGNOSIS — E66.811 OBESITY, CLASS I, BMI 30-34.9: ICD-10-CM

## 2025-01-03 DIAGNOSIS — Z00.00 ANNUAL PHYSICAL EXAM: ICD-10-CM

## 2025-01-03 DIAGNOSIS — I10 ESSENTIAL HYPERTENSION: ICD-10-CM

## 2025-01-03 DIAGNOSIS — G43.109 MIGRAINE WITH AURA AND WITHOUT STATUS MIGRAINOSUS, NOT INTRACTABLE: Primary | ICD-10-CM

## 2025-01-03 DIAGNOSIS — E11.9 TYPE 2 DIABETES MELLITUS WITHOUT COMPLICATION, WITHOUT LONG-TERM CURRENT USE OF INSULIN (HCC): ICD-10-CM

## 2025-01-03 LAB — SL AMB POCT HEMOGLOBIN AIC: 8.2 (ref ?–6.5)

## 2025-01-03 PROCEDURE — 99214 OFFICE O/P EST MOD 30 MIN: CPT | Performed by: INTERNAL MEDICINE

## 2025-01-03 PROCEDURE — 99395 PREV VISIT EST AGE 18-39: CPT | Performed by: INTERNAL MEDICINE

## 2025-01-03 PROCEDURE — 83036 HEMOGLOBIN GLYCOSYLATED A1C: CPT | Performed by: INTERNAL MEDICINE

## 2025-01-03 NOTE — ASSESSMENT & PLAN NOTE
BP Readings from Last 3 Encounters:   01/03/25 136/74   11/14/24 138/80   10/04/24 132/70       Controlled for age   Current medications: lisinopril 40 mg daily   Compliant     Plan:   Continue current regimen  Patient instructed to decrease consumption of fried/process/ fast foods and increase whole food intake   Goal of 3-5 servings of vegetables per day   Minimize salt to < 2g per day   Portion control   Goal of exercise 150 minutes per week of exercise, or 30 minute of brisk exercise 5x/ week but increase activity slowly (SMART goal)  to get to goal.

## 2025-01-03 NOTE — ASSESSMENT & PLAN NOTE
Lab Results   Component Value Date    HGBA1C 8.2 (A) 01/03/2025   Last A1c: 6.8    Ozempic mg  .5 mg weekly   No signs or symptoms of hypoglycemic episodes, no new changes in vision  Hasn't had her ozempic since the prescription ran out for a week or 2, but was prescription refilled. She has not been following as much of a diet during the holiday time.       Plan:   FU in 3 months  Continue current dose of  ozempic per patient request (is afraid if continued GI effects) , but is ameble to increasing dose at next visit if A1C does not show improvement with diet or consider adding glipizide.   FU with ophthalmology referral - given central scheduling Central Scheduling at [phone]         Orders:    POCT hemoglobin A1c
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
## 2025-01-03 NOTE — PROGRESS NOTES
Adult Annual Physical  Name: Carlita Cui      : 1985      MRN: [id number]  Encounter Provider: Melany Johansen MD  Encounter Date: 1/3/2025   Encounter department: [facility]    Assessment & Plan  Migraine with aura and without status migrainosus, not intractable  Current regimen:   Abortive : Continue Imitrex 100mg at the earliest onset of a migraine.   Topamax 100 mg nightly for preventative   Controlled, adherent     Getting head aches about 2x per week, greatly improved   Had a bad time with the [employer] on new years, but headaches are improving     Plan:   Continue current regimen  FU with neurology 25         Essential hypertension  BP Readings from Last 3 Encounters:   25 136/74   24 138/80   10/04/24 132/70       Controlled for age   Current medications: lisinopril 40 mg daily   Compliant     Plan:   Continue current regimen  Patient instructed to decrease consumption of fried/process/ fast foods and increase whole food intake   Goal of 3-5 servings of vegetables per day   Minimize salt to < 2g per day   Portion control   Goal of exercise 150 minutes per week of exercise, or 30 minute of brisk exercise 5x/ week but increase activity slowly (SMART goal)  to get to goal.                 Type 2 diabetes mellitus without complication, without long-term current use of insulin ([facility])    Lab Results   Component Value Date    HGBA1C 8.2 (A) 2025   Last A1c: 6.8    Ozempic mg  .5 mg weekly   No signs or symptoms of hypoglycemic episodes, no new changes in vision  Hasn't had her ozempic since the prescription ran out for a week or 2, but was prescription refilled. She has not been following as much of a diet during the holiday time.       Plan:   FU in 3 months  Continue current dose of  ozempic per patient request (is afraid if continued GI effects) , but is ameble to increasing dose at next visit if A1C does not show improvement with diet  or consider adding glipizide.   FU with ophthalmology referral - given central scheduling Central Scheduling at [phone]         Orders:    POCT hemoglobin A1c      Obesity, Class I, BMI 30-34.9  Losing weight since on ozempic  And exercises more with walking        Immunizations and preventive care screenings were discussed with patient today. Appropriate education was printed on patient's after visit summary.    Counseling:  Alcohol/drug use: discussed moderation in alcohol intake, the recommendations for healthy alcohol use, and avoidance of illicit drug use.  Sexual health: discussed sexually transmitted diseases, partner selection, use of condoms, avoidance of unintended pregnancy, and contraceptive alternatives.  Exercise: the importance of regular exercise/physical activity was discussed. Recommend exercise 3-5 times per week for at least 30 minutes.     BMI Counseling: Body mass index is 32.48 kg/m². The BMI is above normal. Nutrition recommendations include decreasing portion sizes. Exercise recommendations include moderate physical activity 150 minutes/week. Rationale for BMI follow-up plan is due to patient being overweight or obese.     Depression Screening and Follow-up Plan: Patient was screened for depression during today's encounter. They screened negative with a PHQ-2 score of 1.        History of Present Illness     Adult Annual Physical:  Patient presents for annual physical.     Diet and Physical Activity:  - Diet/Nutrition: well balanced diet. fell off the wagon, around the last month  - Exercise: 1-2 times a week on average.    Depression Screening:  - PHQ-2 Score: 1    General Health:  - Sleep: 7-8 hours of sleep on average.  - Hearing: normal hearing bilateral ears.  - Vision: vision problems.  - Dental: no dental visits for > 1 year.    /GYN Health:  - Follows with GYN: yes.   - Last menstrual cycle: 12/6/2024.   - History of STDs: no  - Contraception:. abstinent      Review of  "Systems   Constitutional:  Negative for chills and fever.   HENT:  Negative for congestion.    Respiratory:  Negative for cough, chest tightness, shortness of breath and wheezing.    Cardiovascular:  Negative for chest pain and palpitations.   Gastrointestinal:  Negative for abdominal distention, constipation, diarrhea, nausea and vomiting.   Genitourinary:  Negative for dysuria and hematuria.   Neurological:  Positive for headaches. Negative for dizziness, seizures, syncope and weakness.         Objective   /74 (BP Location: Left arm, Patient Position: Sitting, Cuff Size: Standard)   Pulse 102   Temp (!) 96.8 °F (36 °C) (Temporal)   Resp 14   Ht 5' 2\" (1.575 m)   Wt 80.6 kg (177 lb 9.6 oz)   SpO2 100%   BMI 32.48 kg/m²     Physical Exam  Vitals and nursing note reviewed.   Constitutional:       General: She is not in acute distress.     Appearance: She is well-developed.   HENT:      Head: Normocephalic and atraumatic.      Right Ear: External ear normal.      Left Ear: External ear normal.      Nose: Nose normal.      Mouth/Throat:      Mouth: Mucous membranes are moist.   Eyes:      Conjunctiva/sclera: Conjunctivae normal.   Cardiovascular:      Rate and Rhythm: Normal rate and regular rhythm.      Heart sounds: No murmur heard.  Pulmonary:      Effort: Pulmonary effort is normal. No respiratory distress.      Breath sounds: Normal breath sounds.   Abdominal:      Palpations: Abdomen is soft.      Tenderness: There is no abdominal tenderness.   Musculoskeletal:         General: No swelling.      Cervical back: Neck supple.   Skin:     General: Skin is warm and dry.      Capillary Refill: Capillary refill takes less than 2 seconds.   Neurological:      Mental Status: She is alert.   Psychiatric:         Mood and Affect: Mood normal.         "

## 2025-01-03 NOTE — PROGRESS NOTES
Adult Annual Physical  Name: Carlita Cui      : 1985      MRN: [id number]  Encounter Provider: Melany Johansen MD  Encounter Date: 1/3/2025   Encounter department: [facility]    Assessment & Plan    Immunizations and preventive care screenings were discussed with patient today. Appropriate education was printed on patient's after visit summary.    Counseling:  {Annual Physical; Counselin}         History of Present Illness   {?Quick Links Encounters * My Last Note * Last Note in Specialty * Snapshot * Since Last Visit * History :18398}  Adult Annual Physical:  Patient presents for annual physical.     Diet and Physical Activity:  - Diet/Nutrition: low carb diet.  - Exercise: walking and 1-2 times a week on average.    Depression Screening:  - PHQ-2 Score: 1    General Health:  - Sleep: 4-6 hours of sleep on average. Slight insomnia  - Hearing: normal hearing right ear and normal hearing left ear.  - Vision: no vision problems.  - Dental: regular dental visits and brushes teeth twice daily.    /GYN Health:  - Follows with GYN: yes.   - History of STDs: no    Review of Systems  {Select to Display PMH (Optional):26359}    Objective {?Quick Links Trend Vitals * Enter New Vitals * Results Review * Timeline (Adult) * Labs * Imaging * Cardiology * Procedures * Lung Cancer Screening * Surgical eConsent :82159}  There were no vitals taken for this visit.    Physical Exam  {Administrative / Billing Section (Optional):11582}

## 2025-01-03 NOTE — PATIENT INSTRUCTIONS
Outpatient Mental Health Resources    www.psychologytoday.com is a resource to find psychotherapy providers, patients can filter therapist search list based on several criteria including language, specialty, gender, insurance, etc. Individuals seeking will need to reach out to perspective providers through information in the directory. You are encouraged to contact multiple providers to given that many providers have a significant wait list for services as well as to find a provider is a good fit for you!     If you would like to be placed on the wait list for services with [facility] you MUST contact intake at [facility] - [phone]    Emergency & Crisis Support    Suicide and Crisis Lifeline: Call or text 988 (Available 24 hours)  Crisis Text Line: Text HOME to 292834 (Available 24/7)  Warm Line: Call [phone] (Confidential mental health support, available Monday-Sunday: 6 AM-10 AM & 4 PM-12 AM)  [facility] Crisis: Call [phone] (For mental health emergencies, or visit your local Emergency Department)  Crime Victims [place]: Call [phone] (24/7 [facility] Hotline, counseling, court & hospital accompaniment, free services)    Local Mental Health Services    [facility]  Call [phone]  Website: [email or web address]  Support for mental health conditions. Free services for all    [facility]  [address] [place] [address] [phone]  Services for all ages; Bilingual (English/Mozambican); Accepts Medical Assistance, Medicare and commercial insurance    [facility]  [address] [address] [phone]  Services for all ages; Bilingual (English/Moz[place]ican); Accepts [place] Blue [employer] Blue Shield, [employer], Aetna, Optum and Cigna    [facility]  [facility] [place] [address] [phone]  Services for ages 4+. English only; Does NOT accept Medical Assistance (only Commercial Insurance)    [facility]   [address], [place], [place]   [phone]  Services for all ages; English only; Accepts [employer] Blue [employer] Blue Shield, [place] Blue Cross Blue Sheild and Medicare    [facility]  [address] [place] [address]  [phone]  Services for ages 6+. Bilingual (English/Burkinan); Accepts Medical Assistance only    [facility]  [address] [place] [address]  [phone]  Services for ages 5+. Bilingual (English/Burkinan); Accepts Medical Assistance    [facility]  [address] [place] [address]  [phone]  Services for ages 14+. Bilingual (English/Burkinan); Accepts Medical Assistance, Medicare, and Commercial Insurance    [facility]  [address] [place] [address]  [phone]  Services for ages 5+. Bilingual (English/Burkinan); Accepts Medical Assistance    [facility]  [address] [place] [address]  [phone]  Services for ages 3+. Bilingual (English/Burkinan); Accepts Medical Assistance and Some Commercial Insurance    [facility]  [address], [place] [address]  [phone]  Services for ages 5+. Bilingual (English/Burkinan); Accepts Medical Assistance    [facility]  [address], [place] [address]  [phone]  Services for ages 6+. Bilingual (English/Burkinan); Accepts Medical Assistance and Commercial Insurance    [facility]   [address] [facility] [place] [address]  [phone]  Services for ages 5+. Bilingual (English/Burkinan); Accepts Medical Assistance, Medicare and Commercial Insurance     [facility]  [facility], [address], [place] [address]  [phone]  Services for all ages (Therapy); 18+ (Psychiatry); English only; Accepts [employer] Blue Cross, Aetna, Highmark, [employer]ellan, [facility] (CHIP & commercial insurance)      Please contact your insurance provider for additional information.   Patient  "Education     Routine physical for adults   The Basics   Written by the doctors and editors at [facility]   What is a physical? -- A physical is a routine visit, or \"check-up,\" with your doctor. You might also hear it called a \"wellness visit\" or \"preventive visit.\"  During each visit, the doctor will:   Ask about your physical and mental health   Ask about your habits, behaviors, and lifestyle   Do an exam   Give you vaccines if needed   Talk to you about any medicines you take   Give advice about your health   Answer your questions  Getting regular check-ups is an important part of taking care of your health. It can help your doctor find and treat any problems you have. But it's also important for preventing health problems.  A routine physical is different from a \"sick visit.\" A sick visit is when you see a doctor because of a health concern or problem. Since physicals are scheduled ahead of time, you can think about what you want to ask the doctor.  How often should I get a physical? -- It depends on your age and health. In general, for people age 21 years and older:   If you are younger than 50 years, you might be able to get a physical every 3 years.   If you are 50 years or older, your doctor might recommend a physical every year.  If you have an ongoing health condition, like diabetes or high blood pressure, your doctor will probably want to see you more often.  What happens during a physical? -- In general, each visit will include:   Physical exam - The doctor or nurse will check your height, weight, heart rate, and blood pressure. They will also look at your eyes and ears. They will ask about how you are feeling and whether you have any symptoms that bother you.   Medicines - It's a good idea to bring a list of all the medicines you take to each doctor visit. Your doctor will talk to you about your medicines and answer any questions. Tell them if you are having any side effects that bother you. You should " "also tell them if you are having trouble paying for any of your medicines.   Habits and behaviors - This includes:   Your diet   Your exercise habits   Whether you smoke, drink alcohol, or use drugs   Whether you are sexually active   Whether you feel safe at home  Your doctor will talk to you about things you can do to improve your health and lower your risk of health problems. They will also offer help and support. For example, if you want to quit smoking, they can give you advice and might prescribe medicines. If you want to improve your diet or get more physical activity, they can help you with this, too.   Lab tests, if needed - The tests you get will depend on your age and situation. For example, your doctor might want to check your:   Cholesterol   Blood sugar   Iron level   Vaccines - The recommended vaccines will depend on your age, health, and what vaccines you already had. Vaccines are very important because they can prevent certain serious or deadly infections.   Discussion of screening - \"Screening\" means checking for diseases or other health problems before they cause symptoms. Your doctor can recommend screening based on your age, risk, and preferences. This might include tests to check for:   Cancer, such as breast, prostate, cervical, ovarian, colorectal, prostate, lung, or skin cancer   Sexually transmitted infections, such as chlamydia and gonorrhea   Mental health conditions like depression and anxiety  Your doctor will talk to you about the different types of screening tests. They can help you decide which screenings to have. They can also explain what the results might mean.   Answering questions - The physical is a good time to ask the doctor or nurse questions about your health. If needed, they can refer you to other doctors or specialists, too.  Adults older than 65 years often need other care, too. As you get older, your doctor will talk to you about:   How to prevent falling at " home   Hearing or vision tests   Memory testing   How to take your medicines safely   Making sure that you have the help and support you need at home  All topics are updated as new evidence becomes available and our peer review process is complete.  This topic retrieved from [employer] on: May 02, 2024.  Topic 610084 Version 1.0  Release: 32.4.3 - C32.122  © 2024 UpToDate, Inc. and/or its affiliates. All rights reserved.  Consumer Information Use and Disclaimer   Disclaimer: This generalized information is a limited summary of diagnosis, treatment, and/or medication information. It is not meant to be comprehensive and should be used as a tool to help the user understand and/or assess potential diagnostic and treatment options. It does NOT include all information about conditions, treatments, medications, side effects, or risks that may apply to a specific patient. It is not intended to be medical advice or a substitute for the medical advice, diagnosis, or treatment of a health care provider based on the health care provider's examination and assessment of a patient's specific and unique circumstances. Patients must speak with a health care provider for complete information about their health, medical questions, and treatment options, including any risks or benefits regarding use of medications. This information does not endorse any treatments or medications as safe, effective, or approved for treating a specific patient. UpToDate, Inc. and its affiliates disclaim any warranty or liability relating to this information or the use thereof.The use of this information is governed by the Terms of Use, available at https://www.woltersdilitronicsuwer.com/en/know/clinical-effectiveness-terms. 2024© UpToDate, Inc. and its affiliates and/or licensors. All rights reserved.  Copyright   © 2024 UpToDate, Inc. and/or its affiliates. All rights reserved.

## 2025-01-04 PROBLEM — E66.811 OBESITY, CLASS I, BMI 30-34.9: Status: ACTIVE | Noted: 2019-05-23

## 2025-01-16 DIAGNOSIS — G43.109 MIGRAINE WITH AURA AND WITHOUT STATUS MIGRAINOSUS, NOT INTRACTABLE: ICD-10-CM

## 2025-01-16 RX ORDER — TOPIRAMATE 25 MG/1
TABLET, FILM COATED ORAL
Qty: 120 TABLET | Refills: 0 | Status: SHIPPED | OUTPATIENT
Start: 2025-01-16

## 2025-01-21 ENCOUNTER — TELEPHONE (OUTPATIENT)
Dept: NEUROLOGY | Facility: CLINIC | Age: 40
End: 2025-01-21

## 2025-01-21 NOTE — TELEPHONE ENCOUNTER
Placed call to confirm patients upcoming appointment, Informed patient of the date/time and location. Advised to call office if they need to reschedule their visit.   Left Patient a message on their voicemail.

## 2025-01-27 ENCOUNTER — HOSPITAL ENCOUNTER (EMERGENCY)
Facility: HOSPITAL | Age: 40
Discharge: HOME | End: 2025-01-27
Attending: EMERGENCY MEDICINE | Admitting: EMERGENCY MEDICINE
Payer: COMMERCIAL

## 2025-01-27 VITALS
RESPIRATION RATE: 17 BRPM | HEART RATE: 98 BPM | WEIGHT: 171.3 LBS | TEMPERATURE: 98.1 F | DIASTOLIC BLOOD PRESSURE: 83 MMHG | BODY MASS INDEX: 31.33 KG/M2 | SYSTOLIC BLOOD PRESSURE: 142 MMHG | OXYGEN SATURATION: 99 %

## 2025-01-27 DIAGNOSIS — N93.8 DYSFUNCTIONAL UTERINE BLEEDING: Primary | ICD-10-CM

## 2025-01-27 LAB
EXT PREGNANCY TEST URINE: NEGATIVE
EXT. CONTROL: NORMAL

## 2025-01-27 PROCEDURE — 81025 URINE PREGNANCY TEST: CPT | Performed by: PHYSICIAN ASSISTANT

## 2025-01-27 PROCEDURE — 99283 EMERGENCY DEPT VISIT LOW MDM: CPT | Performed by: PHYSICIAN ASSISTANT

## 2025-01-27 PROCEDURE — 99284 EMERGENCY DEPT VISIT MOD MDM: CPT

## 2025-01-27 NOTE — ED PROVIDER NOTES
Time reflects when diagnosis was documented in both MDM as applicable and the Disposition within this note       Time User Action Codes Description Comment    1/27/2025  1:13 AM GalloDonie Add [N93.8] Dysfunctional uterine bleeding           ED Disposition       ED Disposition   Discharge    Condition   Stable    Date/Time   Mon Jan 27, 2025  1:12 AM    Comment   Carlita Cui discharge to home/self care.                   Assessment & Plan       Medical Decision Making  Patient was counseled on proper labeling of medications.  Patient had minor bleeding today but deferred pelvic exam.  No significant passage of clots or significant bleeding.  Patient has follow-up with OB/GYN next month.  Patient was educated in supportive care and given return precautions.  Discharged home.    Amount and/or Complexity of Data Reviewed  Labs: ordered.             Medications - No data to display    ED Risk Strat Scores                          SBIRT 22yo+      Flowsheet Row Most Recent Value   Initial Alcohol Screen: US AUDIT-C     1. How often do you have a drink containing alcohol? 0 Filed at: 01/27/2025 0100   2. How many drinks containing alcohol do you have on a typical day you are drinking?  0 Filed at: 01/27/2025 0100   3b. FEMALE Any Age, or MALE 65+: How often do you have 4 or more drinks on one occassion? 0 Filed at: 01/27/2025 0100   Audit-C Score 0 Filed at: 01/27/2025 0100   [place]: How many times in the past year have you...    Used an illegal drug or used a prescription medication for non-medical reasons? Never Filed at: 01/27/2025 0100                            History of Present Illness       Chief Complaint   Patient presents with    Vaginal Bleeding     Pt reports on Thursday she mistakenly took a plan B instead of advil/ibuprofen.  Pt reports that she noticed some spotting today along with nausea. LMP 12/31 appr.        Past Medical History:   Diagnosis Date    Allergic 04/02/1993    Anemia N/A     Diabetes mellitus (HCC)     Heart murmur     Heart palpitations     Hypertension     Wears glasses       Past Surgical History:   Procedure Laterality Date    NO PAST SURGERIES      OH LAPS MYOMECTOMY EXC 5/> MYOMAS >250 GRAMS N/A 4/22/2019    Procedure: MYOMECTOMY LAPAROSCOPIC;  Surgeon: Phuong Juan MD;  Location: [facility] OR;  Service: Gynecology    UTERINE FIBROID SURGERY        Family History   Problem Relation Age of Onset    Diabetes Mother     Hypertension Father     Diabetes Maternal Grandmother     Heart disease Paternal Grandmother     Bipolar disorder Paternal Grandmother     Ulcers Paternal Grandfather     Heart disease Family     Cancer Maternal Aunt         Leukemia    Cancer Paternal Aunt         Breast Cancer      Social History     Tobacco Use    Smoking status: Never     Passive exposure: Never    Smokeless tobacco: Never   Vaping Use    Vaping status: Never Used   Substance Use Topics    Alcohol use: No    Drug use: No      E-Cigarette/Vaping    E-Cigarette Use Never User       E-Cigarette/Vaping Substances    Nicotine No     THC No     CBD No     Flavoring No     Other No     Unknown No       I have reviewed and agree with the history as documented.     39-year-old female without significant past medical history presents complaining of mild vaginal bleeding after accidentally taking a Plan B.  Patient states that she was trying to take Midol from a friend when she accidentally took a Plan B that was in the same bottle.  Patient denies chance of pregnancy prior to taking this pill.  Denies any other complaints.  Patient states she just wanted to make sure she was okay.      History provided by:  Patient   used: No        Review of Systems   Constitutional: Negative.  Negative for chills and fatigue.   HENT:  Negative for ear pain and sore throat.    Eyes:  Negative for photophobia and redness.   Respiratory:  Negative for apnea, cough and shortness of breath.     Cardiovascular:  Negative for chest pain.   Gastrointestinal:  Negative for abdominal pain, nausea and vomiting.   Genitourinary:  Positive for vaginal bleeding. Negative for dysuria.   Musculoskeletal:  Negative for arthralgias, neck pain and neck stiffness.   Skin:  Negative for rash.   Neurological:  Negative for dizziness, tremors, syncope and weakness.   Psychiatric/Behavioral:  Negative for suicidal ideas.            Objective       ED Triage Vitals [01/27/25 0046]   Temperature Pulse Blood Pressure Respirations SpO2 Patient Position - Orthostatic VS   98.1 °F (36.7 °C) 105 146/85 18 99 % Lying      Temp Source Heart Rate Source BP Location FiO2 (%) Pain Score    Oral Monitor Left arm -- --      Vitals      Date and Time Temp Pulse SpO2 Resp BP Pain Score FACES Pain Rating User   01/27/25 0111 -- 98 99 % 17 142/83 -- -- SUZAN   01/27/25 0046 98.1 °F (36.7 °C) 105 99 % 18 146/85 -- -- SUZAN            Physical Exam  Constitutional:       General: She is not in acute distress.     Appearance: She is well-developed. She is not diaphoretic.   Eyes:      Pupils: Pupils are equal, round, and reactive to light.   Cardiovascular:      Rate and Rhythm: Normal rate and regular rhythm.   Pulmonary:      Effort: Pulmonary effort is normal. No respiratory distress.      Breath sounds: Normal breath sounds.   Abdominal:      General: Bowel sounds are normal. There is no distension.      Palpations: Abdomen is soft.   Musculoskeletal:         General: Normal range of motion.      Cervical back: Normal range of motion and neck supple.   Skin:     General: Skin is warm and dry.   Neurological:      Mental Status: She is alert and oriented to person, place, and time.         Results Reviewed       Procedure Component Value Units Date/Time    POCT pregnancy, urine [[id number]]  (Normal) Collected: 01/27/25 0100    Lab Status: Final result Updated: 01/27/25 0100     EXT Preg Test, Ur Negative     Control Valid            No orders to  display       Procedures    ED Medication and Procedure Management   Prior to Admission Medications   Prescriptions Last Dose Informant Patient Reported? Taking?   Blood Pressure Monitoring (B-D ASSURE BPM/AUTO ARM CUFF) MISC  Self No No   Sig: by Does not apply route daily   Multiple Vitamin (MULTIVITAMIN) capsule  Self Yes No   Sig: Take 1 capsule by mouth every morning    SUMAtriptan (IMITREX) 100 mg tablet  Self No No   Sig: Take at the onset of a migraine.  May repeat again in 2 hours if not completely headache free. Limit of 3/week or 12/month   acetaminophen (TYLENOL) 650 mg CR tablet  Self No No   Sig: Take 1 tablet (650 mg total) by mouth every 8 (eight) hours as needed for mild pain   cholecalciferol (VITAMIN D3) 1,000 units tablet   No No   Sig: TAKE 2 TABLETS (2,000 UNITS TOTAL) BY MOUTH DAILY   cyanocobalamin (VITAMIN B-12) 500 mcg tablet  Self Yes No   Sig: Take 500 mcg by mouth every morning    ibuprofen (MOTRIN) 800 mg tablet  Self No No   Sig: Take 1 tablet (800 mg total) by mouth every 6 (six) hours as needed for moderate pain or mild pain   lisinopril (ZESTRIL) 40 mg tablet  Self No No   Sig: Take 1 tablet (40 mg total) by mouth daily   semaglutide, 0.25 or 0.5 mg/dose, (Ozempic, 0.25 or 0.5 MG/DOSE,) 2 mg/3 mL injection pen   No No   Sig: Inject 0.75 mL (0.5 mg total) under the skin every 7 days   topiramate (TOPAMAX) 25 mg tablet   No No   Sig: PLEASE SEE ATTACHED FOR DETAILED DIRECTIONS   vitamin A 2250 MCG (7500 UT) capsule  Self Yes No   Sig: Take 7,500 Units by mouth daily      Facility-Administered Medications: None     Patient's Medications   Discharge Prescriptions    No medications on file     No discharge procedures on file.  ED SEPSIS DOCUMENTATION   Time reflects when diagnosis was documented in both MDM as applicable and the Disposition within this note       Time User Action Codes Description Comment    1/27/2025  1:13 AM Whitney Holder Add [N93.8] Dysfunctional uterine bleeding                   Whitney Holder PA-C  01/27/25 0113

## 2025-01-29 DIAGNOSIS — I10 ESSENTIAL HYPERTENSION: ICD-10-CM

## 2025-01-29 LAB
LEFT EYE DIABETIC RETINOPATHY: NORMAL
RIGHT EYE DIABETIC RETINOPATHY: NORMAL

## 2025-01-29 RX ORDER — LISINOPRIL 40 MG/1
40 TABLET ORAL DAILY
Qty: 90 TABLET | Refills: 3 | Status: SHIPPED | OUTPATIENT
Start: 2025-01-29 | End: 2025-04-29

## 2025-02-03 NOTE — PATIENT INSTRUCTIONS
Patient Instructions:    Additional Testing  -You are not in need of cerebrovascular imaging at this time.     Headache/migraine treatment:     Prevention  -To take every day to help prevent headaches - not to take at the time of headache:  -Continue topamax 25mg daily  -Start amitriptyline 10mg nightly x 1 week.  May increase by 10mg weekly thereafter until good headache relief is achieved or until you reach a max dose of 50mg nightly.     Abortive  -For immediate treatment of a headache/migraine  -For your more moderate to severe migraines take this medication as early as possible:  -Continue Imitrex 100 mg at the earliest onset of migraine.  May repeat again in 2 hours if not completely headache free  -It is ok to take ibuprofen, acetaminophen or naproxen (Advil, Tylenol,  Aleve, Excedrin) if they help your headaches you should limit these to No more than 3 times a week to avoid medication overuse/rebound headaches.     Lifestyle Recommendations:    -Remain well-hydrated drinking at least 48 to 64 ounces of noncaffeinated beverages per day in addition to anything caffeinated.    -Getting adequate rest is also very important for migraine prevention (aim for 7-8 hours per night).     -Regular exercise is also beneficial for headache prevention.  I would encourage at the least 5 days of physical exercise weekly for at least 30 minutes.   -I would like for them to keep track of their migraines using an application on their phone or calendar as they see fit. Phone applications: Migraine Silas or Migraine Diary.    Education and Follow-up:    -Please call or send a [employer] message with any questions or concerns. Please present to the emergency room with any concerning symptoms such as: worst headache of your life, sudden painless loss of vision or double vision, difficulty speaking or swallowing, vertigo/room spinning that does not quickly resolve, or weakness/numbness/loss of coordination affecting 1 side of the face  or body.  -Follow up in 2 months or sooner if needed.
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
## 2025-02-04 ENCOUNTER — TELEPHONE (OUTPATIENT)
Dept: NEUROLOGY | Facility: CLINIC | Age: 40
End: 2025-02-04

## 2025-02-05 ENCOUNTER — OFFICE VISIT (OUTPATIENT)
Dept: NEUROLOGY | Facility: CLINIC | Age: 40
End: 2025-02-05
Payer: COMMERCIAL

## 2025-02-05 VITALS — SYSTOLIC BLOOD PRESSURE: 132 MMHG | HEART RATE: 102 BPM | DIASTOLIC BLOOD PRESSURE: 78 MMHG

## 2025-02-05 DIAGNOSIS — G43.109 MIGRAINE WITH AURA AND WITHOUT STATUS MIGRAINOSUS, NOT INTRACTABLE: ICD-10-CM

## 2025-02-05 DIAGNOSIS — G43.009 MIGRAINE WITHOUT AURA AND WITHOUT STATUS MIGRAINOSUS, NOT INTRACTABLE: Primary | ICD-10-CM

## 2025-02-05 PROCEDURE — 99214 OFFICE O/P EST MOD 30 MIN: CPT

## 2025-02-05 RX ORDER — AMITRIPTYLINE HYDROCHLORIDE 10 MG/1
TABLET ORAL
Qty: 150 TABLET | Refills: 1 | Status: SHIPPED | OUTPATIENT
Start: 2025-02-05

## 2025-02-05 RX ORDER — TOPIRAMATE 25 MG/1
25 TABLET, FILM COATED ORAL DAILY
Qty: 30 TABLET | Refills: 3 | Status: SHIPPED | OUTPATIENT
Start: 2025-02-05

## 2025-02-05 RX ORDER — SUMATRIPTAN SUCCINATE 100 MG/1
TABLET ORAL
Qty: 12 TABLET | Refills: 3 | Status: SHIPPED | OUTPATIENT
Start: 2025-02-05

## 2025-02-05 NOTE — PROGRESS NOTES
Review of Systems   Constitutional:  Negative for appetite change, fatigue and fever.   HENT: Negative.  Negative for hearing loss, tinnitus, trouble swallowing and voice change.    Eyes: Negative.  Negative for photophobia, pain and visual disturbance.   Respiratory: Negative.  Negative for shortness of breath.    Cardiovascular: Negative.  Negative for palpitations.   Gastrointestinal: Negative.  Negative for nausea and vomiting.   Endocrine: Negative.  Negative for cold intolerance.   Genitourinary: Negative.  Negative for dysuria, frequency and urgency.   Musculoskeletal:  Negative for back pain, gait problem, myalgias, neck pain and neck stiffness.   Skin: Negative.  Negative for rash.   Allergic/Immunologic: Negative.    Neurological:  Positive for headaches (1 headache once daily). Negative for dizziness, tremors, seizures, syncope, facial asymmetry, speech difficulty, weakness, light-headedness and numbness.   Hematological: Negative.  Does not bruise/bleed easily.   Psychiatric/Behavioral: Negative.  Negative for confusion, hallucinations and sleep disturbance.    All other systems reviewed and are negative.
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
## 2025-02-05 NOTE — ASSESSMENT & PLAN NOTE
Orders:    SUMAtriptan (IMITREX) 100 mg tablet; Take at the onset of a migraine.  May repeat again in 2 hours if not completely headache free. Limit of 3/week or 12/month    topiramate (TOPAMAX) 25 mg tablet; Take 1 tablet (25 mg total) by mouth daily    amitriptyline (ELAVIL) 10 mg tablet; start 10mg at bedtime. Increase by 10mg each week until good effect on headaches/pain or reach 50mg daily

## 2025-02-07 ENCOUNTER — RESULTS FOLLOW-UP (OUTPATIENT)
Dept: OTHER | Facility: HOSPITAL | Age: 40
End: 2025-02-07

## 2025-02-07 NOTE — RESULT ENCOUNTER NOTE
Hello,     Please let this patient know that her Diabetes eye exam was normal for both eyes.     Thanks

## 2025-02-14 ENCOUNTER — ANNUAL EXAM (OUTPATIENT)
Dept: OBGYN CLINIC | Facility: CLINIC | Age: 40
End: 2025-02-14

## 2025-02-14 VITALS
BODY MASS INDEX: 26.57 KG/M2 | DIASTOLIC BLOOD PRESSURE: 72 MMHG | HEIGHT: 62 IN | WEIGHT: 144.4 LBS | SYSTOLIC BLOOD PRESSURE: 138 MMHG

## 2025-02-14 DIAGNOSIS — Z12.4 SCREENING FOR CERVICAL CANCER: ICD-10-CM

## 2025-02-14 DIAGNOSIS — Z98.890 STATUS POST MYOMECTOMY: ICD-10-CM

## 2025-02-14 DIAGNOSIS — Z12.31 ENCOUNTER FOR SCREENING MAMMOGRAM FOR MALIGNANT NEOPLASM OF BREAST: ICD-10-CM

## 2025-02-14 DIAGNOSIS — Z01.419 ROUTINE GYNECOLOGICAL EXAMINATION: Primary | ICD-10-CM

## 2025-02-14 PROCEDURE — G0145 SCR C/V CYTO,THINLAYER,RESCR: HCPCS | Performed by: OBSTETRICS & GYNECOLOGY

## 2025-02-14 PROCEDURE — 99385 PREV VISIT NEW AGE 18-39: CPT | Performed by: OBSTETRICS & GYNECOLOGY

## 2025-02-14 PROCEDURE — G0476 HPV COMBO ASSAY CA SCREEN: HCPCS | Performed by: OBSTETRICS & GYNECOLOGY

## 2025-02-14 NOTE — PROGRESS NOTES
ANNUAL GYNECOLOGICAL EXAMINATION    Carlita Cui is a 39 y.o. female who presents today for annual GYN exam.  Her last pap smear was performed 18 and result was HPV pos.  She reports no history of abnormal pap smears in her past.   She had HIV screening performed 10/24/24 and it was negative.  She reports menses as regular.  Patient's last menstrual period was 2025 (approximate).  Her general medical history has been reviewed and she reports it as follows:    Past Medical History:   Diagnosis Date    Allergic 1993    Anemia N/A    Diabetes mellitus (HCC)     Heart murmur     Heart palpitations     Hypertension     Wears glasses      Past Surgical History:   Procedure Laterality Date    NO PAST SURGERIES      MI LAPS MYOMECTOMY EXC 5/> MYOMAS >250 GRAMS N/A 2019    Procedure: MYOMECTOMY LAPAROSCOPIC;  Surgeon: Phuong Juan MD;  Location: [facility];  Service: Gynecology    UTERINE FIBROID SURGERY       OB History          1    Para        Term                AB   1    Living             SAB   1    IAB        Ectopic        Multiple        Live Births                   Social History     Tobacco Use    Smoking status: Never     Passive exposure: Never    Smokeless tobacco: Never   Vaping Use    Vaping status: Never Used   Substance Use Topics    Alcohol use: No    Drug use: No     Social History     Substance and Sexual Activity   Sexual Activity Not Currently    Partners: Male    Birth control/protection: Abstinence, Other    Comment: I was on birth control named sprintec.     Cancer-related family history includes Cancer in her maternal aunt and paternal aunt.    Current Outpatient Medications   Medication Instructions    acetaminophen (TYLENOL) 650 mg, Oral, Every 8 hours PRN    amitriptyline (ELAVIL) 10 mg tablet start 10mg at bedtime. Increase by 10mg each week until good effect on headaches/pain or reach 50mg daily    Blood Pressure Monitoring (B-D ASSURE  "BPM/AUTO ARM CUFF) MISC Does not apply, Daily    cholecalciferol (VITAMIN D3) 1,000 units tablet TAKE 2 TABLETS (2,000 UNITS TOTAL) BY MOUTH DAILY    ibuprofen (MOTRIN) 800 mg, Oral, Every 6 hours PRN    lisinopril (ZESTRIL) 40 mg, Oral, Daily    Multiple Vitamin (MULTIVITAMIN) capsule 1 capsule, Every morning    semaglutide (0.25 or 0.5 mg/dose) (OZEMPIC (0.25 OR 0.5 MG/DOSE)) 0.5 mg, Subcutaneous, Every 7 days    SUMAtriptan (IMITREX) 100 mg tablet Take at the onset of a migraine.  May repeat again in 2 hours if not completely headache free. Limit of 3/week or 12/month    topiramate (TOPAMAX) 25 mg, Oral, Daily    vitamin A 7,500 Units, Daily    vitamin B-12 (VITAMIN B-12) 500 mcg, Every morning       Review of Systems:  Review of Systems   Genitourinary:  Negative for menstrual problem, pelvic pain, vaginal bleeding and vaginal discharge.   All other systems reviewed and are negative.      Physical Exam:  /72 (BP Location: Left arm, Patient Position: Sitting, Cuff Size: Standard)   Ht 5' 2\" (1.575 m)   Wt 65.5 kg (144 lb 6.4 oz)   LMP 01/04/2025 (Approximate)   BMI 26.41 kg/m²   Physical Exam  Constitutional:       Appearance: Normal appearance.   Genitourinary:      Bladder and urethral meatus normal.      No lesions in the vagina.      Right Labia: No rash, tenderness or lesions.     Left Labia: No tenderness, lesions or rash.     No inguinal adenopathy present in the right or left side.     No vaginal discharge.        Right Adnexa: not tender, not full and no mass present.     Left Adnexa: not tender, not full and no mass present.     No cervical motion tenderness, discharge or lesion.      Uterus is not enlarged or tender.      No uterine mass detected.     No urethral tenderness or mass present.   Breasts:     Breasts are soft.     Right: No inverted nipple, mass, nipple discharge, skin change or tenderness.      Left: No inverted nipple, mass, nipple discharge, skin change or tenderness.   HENT: "      Head: Normocephalic and atraumatic.   Cardiovascular:      Rate and Rhythm: Normal rate and regular rhythm.   Pulmonary:      Effort: Pulmonary effort is normal.      Breath sounds: Normal breath sounds.   Abdominal:      General: Bowel sounds are normal.      Palpations: Abdomen is soft.      Hernia: There is no hernia in the left inguinal area or right inguinal area.   Musculoskeletal:         General: Normal range of motion.      Cervical back: Normal range of motion and neck supple.   Lymphadenopathy:      Upper Body:      Right upper body: No supraclavicular or axillary adenopathy.      Left upper body: No supraclavicular or axillary adenopathy.      Lower Body: No right inguinal adenopathy. No left inguinal adenopathy.   Neurological:      Mental Status: She is alert and oriented to person, place, and time.   Skin:     General: Skin is warm and dry.   Psychiatric:         Mood and Affect: Mood normal.   Vitals and nursing note reviewed.           Assessment/Plan:   1. Normal well-woman GYN exam.  2. Cervical cancer screening:  Normal cervical exam.  Pap smear done with HPV co-testing.  Patient had her 1st HPV dose in January at the local pharmacy and will continue with the series at our office. Patient will return in March and 6 months for subsequent vaccinations.     3. STD screening:  Patient declines     4. Breast cancer screening:  Normal breast exam.  Order placed for bilateral screening mammogram.  Reviewed breast self-awareness.   5. Depression Screening: Patient's depression screening was assessed with a PHQ-2 score of 1. Clinically patient does not have depression. No treatment is required.     6. BMI Counseling: Body mass index is 26.41 kg/m². Discussed the patient's BMI with her. The BMI is above normal. Nutrition recommendations include decreasing overall calorie intake, decreasing soda and/or juice intake, moderation in carbohydrate intake, increasing intake of lean protein, reducing intake  of saturated fat and trans fat, and reducing intake of cholesterol.   7. Contraception:  none   8. H/o fibroids: pelvic sonogram ordered   9. Return to office 1yr annual & 4-6wks results.    Reviewed with patient that test results are available in MyChart immediately, but that they will not necessarily be reviewed by me immediately.  Explained that I will review results at my earliest opportunity and contact patient appropriately.

## 2025-02-17 LAB
HPV HR 12 DNA CVX QL NAA+PROBE: NEGATIVE
HPV16 DNA CVX QL NAA+PROBE: NEGATIVE
HPV18 DNA CVX QL NAA+PROBE: NEGATIVE

## 2025-02-18 ENCOUNTER — HOSPITAL ENCOUNTER (OUTPATIENT)
Dept: ULTRASOUND IMAGING | Facility: HOSPITAL | Age: 40
Discharge: HOME | End: 2025-02-18
Payer: COMMERCIAL

## 2025-02-18 DIAGNOSIS — Z98.890 STATUS POST MYOMECTOMY: ICD-10-CM

## 2025-02-18 PROCEDURE — 76830 TRANSVAGINAL US NON-OB: CPT

## 2025-02-18 PROCEDURE — 76856 US EXAM PELVIC COMPLETE: CPT

## 2025-02-20 DIAGNOSIS — E11.9 TYPE 2 DIABETES MELLITUS WITHOUT COMPLICATION, WITHOUT LONG-TERM CURRENT USE OF INSULIN (HCC): ICD-10-CM

## 2025-02-20 LAB
LAB AP GYN PRIMARY INTERPRETATION: NORMAL
Lab: NORMAL
PATH INTERP SPEC-IMP: NORMAL

## 2025-02-22 DIAGNOSIS — E11.9 TYPE 2 DIABETES MELLITUS WITHOUT COMPLICATION, WITHOUT LONG-TERM CURRENT USE OF INSULIN (HCC): ICD-10-CM

## 2025-02-25 RX ORDER — SEMAGLUTIDE 0.68 MG/ML
INJECTION, SOLUTION SUBCUTANEOUS
OUTPATIENT
Start: 2025-02-25

## 2025-03-04 ENCOUNTER — HOSPITAL ENCOUNTER (EMERGENCY)
Facility: HOSPITAL | Age: 40
Discharge: HOME | End: 2025-03-04
Attending: EMERGENCY MEDICINE
Payer: COMMERCIAL

## 2025-03-04 VITALS
BODY MASS INDEX: 32.3 KG/M2 | OXYGEN SATURATION: 100 % | RESPIRATION RATE: 18 BRPM | WEIGHT: 176.59 LBS | SYSTOLIC BLOOD PRESSURE: 170 MMHG | DIASTOLIC BLOOD PRESSURE: 92 MMHG | TEMPERATURE: 98 F | HEART RATE: 98 BPM

## 2025-03-04 DIAGNOSIS — S69.91XA INJURY OF FINGER OF RIGHT HAND, INITIAL ENCOUNTER: Primary | ICD-10-CM

## 2025-03-04 PROCEDURE — 99283 EMERGENCY DEPT VISIT LOW MDM: CPT

## 2025-03-04 PROCEDURE — 99283 EMERGENCY DEPT VISIT LOW MDM: CPT | Performed by: EMERGENCY MEDICINE

## 2025-03-05 NOTE — ED PROVIDER NOTES
Time reflects when diagnosis was documented in both MDM as applicable and the Disposition within this note       Time User Action Codes Description Comment    3/4/2025  8:21 PM Lars Owusu Add [S69.91XA] Injury of finger of right hand, initial encounter           ED Disposition       ED Disposition   Discharge    Condition   Stable    Date/Time   Tue Mar 4, 2025  8:21 PM    Comment   Carlita TONY See discharge to home/self care.                   Assessment & Plan       Medical Decision Making      MDM/DDx: Right pinky injury - possible subungual hematoma, clinically doubt fracture, dislocation or other internal injury.    A/P: Will remove acrylic nail, treat symptoms, reevaluate for further work up and disposition.        ED Course as of 03/04/25 2023   Tue Mar 04, 2025   2021 Native nail now with visible and without subungual hematoma.  Recommend Tylenol or Motrin and follow-up as needed.       Medications - No data to display    ED Risk Strat Scores                            SBIRT 22yo+      Flowsheet Row Most Recent Value   Initial Alcohol Screen: US AUDIT-C     1. How often do you have a drink containing alcohol? 0 Filed at: 03/04/2025 1948   2. How many drinks containing alcohol do you have on a typical day you are drinking?  0 Filed at: 03/04/2025 1948   3b. FEMALE Any Age, or MALE 65+: How often do you have 4 or more drinks on one occassion? 0 Filed at: 03/04/2025 1948   Audit-C Score 0 Filed at: 03/04/2025 1948   [place]: How many times in the past year have you...    Used an illegal drug or used a prescription medication for non-medical reasons? Never Filed at: 03/04/2025 1948                            History of Present Illness       Chief Complaint   Patient presents with    Finger Pain     Rt pinky, states she was moving books on a shelf when they toppled over on her finger. Full sensation, no meds pta        Past Medical History:   Diagnosis Date    Allergic 04/02/1993    Anemia N/A    Diabetes  mellitus (HCC)     Heart murmur     Heart palpitations     Hypertension     Wears glasses       Past Surgical History:   Procedure Laterality Date    NO PAST SURGERIES      SC LAPS MYOMECTOMY EXC 5/> MYOMAS >250 GRAMS N/A 4/22/2019    Procedure: MYOMECTOMY LAPAROSCOPIC;  Surgeon: Phuong Juan MD;  Location: [facility];  Service: Gynecology    UTERINE FIBROID SURGERY        Family History   Problem Relation Age of Onset    Diabetes Mother     Hypertension Father     Diabetes Maternal Grandmother     Heart disease Paternal Grandmother     Bipolar disorder Paternal Grandmother     Ulcers Paternal Grandfather     Heart disease Family     Cancer Maternal Aunt         Leukemia    Cancer Paternal Aunt         Breast Cancer      Social History     Tobacco Use    Smoking status: Never     Passive exposure: Never    Smokeless tobacco: Never   Vaping Use    Vaping status: Never Used   Substance Use Topics    Alcohol use: No    Drug use: No      E-Cigarette/Vaping    E-Cigarette Use Never User       E-Cigarette/Vaping Substances    Nicotine No     THC No     CBD No     Flavoring No     Other No     Unknown No       I have reviewed and agree with the history as documented.     39-year-old female presents for complaint of pain in her right pinky, under the nail, after she was adjusting books on a shelf and they toppled over, striking her acrylic nail.  She has no pain in the bone or joint of the pinky but was unable to see underneath the acrylic nail and is asking us to remove it.  She did not take anything for the discomfort prior to arrival.  There is a scant amount of blood evident at the base of the acrylic nail and the base of the nail appears to be partially  from the natural nail.  There is no active bleeding, swelling, ecchymosis or crepitus.  Unable to determine if there is a subungual hematoma.    No recent travel or sick contacts.    ROS: No associated fever, LH/dizziness, CP, SOB, n/v/d. Denies other  injury or complaint.          History provided by:  Patient and medical records  Injury  Location:  Right fifth fingertip/nail  Severity:  Mild  Onset quality:  Sudden  Duration:  1 day  Progression:  Unchanged  Chronicity:  New  Relieved by:  Nothing tried  Worsened by:  Palpation  Ineffective treatments:  None tried  Associated symptoms: no abdominal pain, no chest pain, no congestion, no cough, no diarrhea, no fever, no headaches, no nausea, no rhinorrhea, no shortness of breath, no sore throat, no vomiting and no wheezing        Review of Systems   Constitutional:  Negative for chills and fever.   HENT:  Negative for congestion, rhinorrhea, sore throat and trouble swallowing.    Eyes: Negative.    Respiratory:  Negative for cough, chest tightness, shortness of breath and wheezing.    Cardiovascular:  Negative for chest pain, palpitations and leg swelling.   Gastrointestinal:  Negative for abdominal pain, diarrhea, nausea and vomiting.   Genitourinary:  Negative for dysuria, flank pain, frequency and urgency.   Musculoskeletal:  Negative for back pain, neck pain and neck stiffness.   Skin:  Negative for pallor.   Neurological:  Negative for dizziness, syncope, weakness, light-headedness, numbness and headaches.   Hematological:  Negative for adenopathy.   Psychiatric/Behavioral:  Negative for confusion. The patient is not nervous/anxious.    All other systems reviewed and are negative.          Objective       ED Triage Vitals [03/04/25 1948]   Temperature Pulse Blood Pressure Respirations SpO2 Patient Position - Orthostatic VS   98 °F (36.7 °C) 98 170/92 18 100 % Sitting      Temp Source Heart Rate Source BP Location FiO2 (%) Pain Score    Oral Monitor Right arm -- --      Vitals      Date and Time Temp Pulse SpO2 Resp BP Pain Score FACES Pain Rating User   03/04/25 1948 98 °F (36.7 °C) 98 100 % 18 170/92 -- --             Physical Exam  Vitals reviewed.   Constitutional:       General: She is not in acute  distress.     Appearance: She is well-developed. She is not ill-appearing or diaphoretic.   HENT:      Head: Normocephalic and atraumatic.      Right Ear: External ear normal.      Left Ear: External ear normal.      Nose: Nose normal.      Mouth/Throat:      Mouth: Mucous membranes are moist.      Pharynx: Oropharynx is clear.   Eyes:      General: No scleral icterus.     Conjunctiva/sclera: Conjunctivae normal.      Pupils: Pupils are equal, round, and reactive to light.   Cardiovascular:      Rate and Rhythm: Normal rate and regular rhythm.      Heart sounds: Normal heart sounds. No murmur heard.  Pulmonary:      Effort: Pulmonary effort is normal. No respiratory distress.      Breath sounds: Normal breath sounds. No wheezing.   Chest:      Chest wall: No tenderness.   Musculoskeletal:         General: Tenderness (Mild, right fifth fingernail) and signs of injury (Right fifth finger tip) present. No swelling or deformity. Normal range of motion.      Cervical back: Normal range of motion and neck supple.   Skin:     General: Skin is warm and dry.      Findings: No rash.   Neurological:      General: No focal deficit present.      Mental Status: She is alert and oriented to person, place, and time.   Psychiatric:         Behavior: Behavior normal.         Thought Content: Thought content normal.         Results Reviewed       None            No orders to display       Procedures    ED Medication and Procedure Management   Prior to Admission Medications   Prescriptions Last Dose Informant Patient Reported? Taking?   Blood Pressure Monitoring (B-D ASSURE BPM/AUTO ARM CUFF) MISC  Self No No   Sig: by Does not apply route daily   Multiple Vitamin (MULTIVITAMIN) capsule  Self Yes No   Sig: Take 1 capsule by mouth every morning    SUMAtriptan (IMITREX) 100 mg tablet   No No   Sig: Take at the onset of a migraine.  May repeat again in 2 hours if not completely headache free. Limit of 3/week or 12/month   acetaminophen  (TYLENOL) 650 mg CR tablet  Self No No   Sig: Take 1 tablet (650 mg total) by mouth every 8 (eight) hours as needed for mild pain   amitriptyline (ELAVIL) 10 mg tablet   No No   Sig: start 10mg at bedtime. Increase by 10mg each week until good effect on headaches/pain or reach 50mg daily   cholecalciferol (VITAMIN D3) 1,000 units tablet  Self No No   Sig: TAKE 2 TABLETS (2,000 UNITS TOTAL) BY MOUTH DAILY   cyanocobalamin (VITAMIN B-12) 500 mcg tablet  Self Yes No   Sig: Take 500 mcg by mouth every morning    ibuprofen (MOTRIN) 800 mg tablet  Self No No   Sig: Take 1 tablet (800 mg total) by mouth every 6 (six) hours as needed for moderate pain or mild pain   lisinopril (ZESTRIL) 40 mg tablet  Self No No   Sig: Take 1 tablet (40 mg total) by mouth daily   semaglutide, 0.25 or 0.5 mg/dose, (Ozempic, 0.25 or 0.5 MG/DOSE,) 2 mg/3 mL injection pen   No No   Sig: Inject 0.75 mL (0.5 mg total) under the skin every 7 days   topiramate (TOPAMAX) 25 mg tablet   No No   Sig: Take 1 tablet (25 mg total) by mouth daily   vitamin A 2250 MCG (7500 UT) capsule  Self Yes No   Sig: Take 7,500 Units by mouth daily      Facility-Administered Medications: None     Current Discharge Medication List        CONTINUE these medications which have NOT CHANGED    Details   acetaminophen (TYLENOL) 650 mg CR tablet Take 1 tablet (650 mg total) by mouth every 8 (eight) hours as needed for mild pain  Qty: 30 tablet, Refills: 0    Associated Diagnoses: Intractable headache, unspecified chronicity pattern, unspecified headache type      amitriptyline (ELAVIL) 10 mg tablet start 10mg at bedtime. Increase by 10mg each week until good effect on headaches/pain or reach 50mg daily  Qty: 150 tablet, Refills: 1    Associated Diagnoses: Migraine without aura and without status migrainosus, not intractable      Blood Pressure Monitoring (B-D ASSURE BPM/AUTO ARM CUFF) MISC by Does not apply route daily  Qty: 1 each, Refills: 0    Associated Diagnoses:  Essential hypertension      cholecalciferol (VITAMIN D3) 1,000 units tablet TAKE 2 TABLETS (2,000 UNITS TOTAL) BY MOUTH DAILY  Qty: 180 tablet, Refills: 1    Associated Diagnoses: Vitamin D deficiency      cyanocobalamin (VITAMIN B-12) 500 mcg tablet Take 500 mcg by mouth every morning       ibuprofen (MOTRIN) 800 mg tablet Take 1 tablet (800 mg total) by mouth every 6 (six) hours as needed for moderate pain or mild pain  Qty: 60 tablet, Refills: 2    Associated Diagnoses: Intractable headache, unspecified chronicity pattern, unspecified headache type      lisinopril (ZESTRIL) 40 mg tablet Take 1 tablet (40 mg total) by mouth daily  Qty: 90 tablet, Refills: 3    Associated Diagnoses: Essential hypertension      Multiple Vitamin (MULTIVITAMIN) capsule Take 1 capsule by mouth every morning       semaglutide, 0.25 or 0.5 mg/dose, (Ozempic, 0.25 or 0.5 MG/DOSE,) 2 mg/3 mL injection pen Inject 0.75 mL (0.5 mg total) under the skin every 7 days  Qty: 3 mL, Refills: 0    Associated Diagnoses: Type 2 diabetes mellitus without complication, without long-term current use of insulin (HCC)      SUMAtriptan (IMITREX) 100 mg tablet Take at the onset of a migraine.  May repeat again in 2 hours if not completely headache free. Limit of 3/week or 12/month  Qty: 12 tablet, Refills: 3    Associated Diagnoses: Migraine without aura and without status migrainosus, not intractable      topiramate (TOPAMAX) 25 mg tablet Take 1 tablet (25 mg total) by mouth daily  Qty: 30 tablet, Refills: 3    Associated Diagnoses: Migraine without aura and without status migrainosus, not intractable      vitamin A 2250 MCG (7500 UT) capsule Take 7,500 Units by mouth daily           No discharge procedures on file.  ED SEPSIS DOCUMENTATION   Time reflects when diagnosis was documented in both MDM as applicable and the Disposition within this note       Time User Action Codes Description Comment    3/4/2025  8:21 PM Lars Owusu Add [S69.91XA] Injury of  finger of right hand, initial encounter                  Lars Owusu DO  03/04/25 2023

## 2025-03-10 ENCOUNTER — CLINICAL SUPPORT (OUTPATIENT)
Dept: OBGYN CLINIC | Facility: CLINIC | Age: 40
End: 2025-03-10

## 2025-03-10 VITALS
DIASTOLIC BLOOD PRESSURE: 72 MMHG | HEIGHT: 62 IN | WEIGHT: 174.8 LBS | SYSTOLIC BLOOD PRESSURE: 130 MMHG | BODY MASS INDEX: 32.17 KG/M2

## 2025-03-10 DIAGNOSIS — Z23 NEED FOR HPV VACCINE: Primary | ICD-10-CM

## 2025-03-10 PROCEDURE — 90471 IMMUNIZATION ADMIN: CPT

## 2025-03-10 PROCEDURE — 90651 9VHPV VACCINE 2/3 DOSE IM: CPT

## 2025-03-10 NOTE — PROGRESS NOTES
Patient given 2nd hpv on left deltoid on 3/10/25    NDC# 2371-5390-21  LOT# [id number]  EXP# [id number]

## 2025-03-12 ENCOUNTER — TELEPHONE (OUTPATIENT)
Dept: NEUROLOGY | Facility: CLINIC | Age: 40
End: 2025-03-12

## 2025-03-24 ENCOUNTER — OFFICE VISIT (OUTPATIENT)
Dept: OBGYN CLINIC | Facility: CLINIC | Age: 40
End: 2025-03-24

## 2025-03-24 VITALS
BODY MASS INDEX: 32.54 KG/M2 | HEIGHT: 62 IN | DIASTOLIC BLOOD PRESSURE: 70 MMHG | WEIGHT: 176.8 LBS | SYSTOLIC BLOOD PRESSURE: 120 MMHG

## 2025-03-24 DIAGNOSIS — Z71.2 ENCOUNTER TO DISCUSS TEST RESULTS: Primary | ICD-10-CM

## 2025-03-24 PROCEDURE — 99213 OFFICE O/P EST LOW 20 MIN: CPT | Performed by: OBSTETRICS & GYNECOLOGY

## 2025-03-24 NOTE — PROGRESS NOTES
PROBLEM GYNECOLOGICAL VISIT    Carlita Cui is a 39 y.o. female who presents for results.  Her general medical history has been reviewed and she reports it as follows:    Past Medical History:   Diagnosis Date    Allergic 1993    Anemia N/A    Diabetes mellitus (HCC)     Heart murmur     Heart palpitations     Hypertension     Wears glasses      Past Surgical History:   Procedure Laterality Date    NO PAST SURGERIES      FL LAPS MYOMECTOMY EXC 5/> MYOMAS >250 GRAMS N/A 2019    Procedure: MYOMECTOMY LAPAROSCOPIC;  Surgeon: Phuong Juan MD;  Location: [facility] OR;  Service: Gynecology    UTERINE FIBROID SURGERY       OB History          1    Para        Term                AB   1    Living             SAB   1    IAB        Ectopic        Multiple        Live Births                   Social History     Tobacco Use    Smoking status: Never     Passive exposure: Never    Smokeless tobacco: Never   Vaping Use    Vaping status: Never Used   Substance Use Topics    Alcohol use: No    Drug use: No     Social History     Substance and Sexual Activity   Sexual Activity Not Currently    Partners: Male    Birth control/protection: Abstinence, Other    Comment: I was on birth control named sprintec.       Current Outpatient Medications   Medication Instructions    acetaminophen (TYLENOL) 650 mg, Oral, Every 8 hours PRN    amitriptyline (ELAVIL) 10 mg tablet start 10mg at bedtime. Increase by 10mg each week until good effect on headaches/pain or reach 50mg daily    Blood Pressure Monitoring (B-D ASSURE BPM/AUTO ARM CUFF) MISC Does not apply, Daily    cholecalciferol (VITAMIN D3) 1,000 units tablet TAKE 2 TABLETS (2,000 UNITS TOTAL) BY MOUTH DAILY    ibuprofen (MOTRIN) 800 mg, Oral, Every 6 hours PRN    lisinopril (ZESTRIL) 40 mg, Oral, Daily    Multiple Vitamin (MULTIVITAMIN) capsule 1 capsule, Every morning    semaglutide (0.25 or 0.5 mg/dose) (OZEMPIC (0.25 OR 0.5 MG/DOSE)) 0.5 mg,  "Subcutaneous, Every 7 days    SUMAtriptan (IMITREX) 100 mg tablet Take at the onset of a migraine.  May repeat again in 2 hours if not completely headache free. Limit of 3/week or 12/month    topiramate (TOPAMAX) 25 mg, Oral, Daily    vitamin A 7,500 Units, Daily    vitamin B-12 (VITAMIN B-12) 500 mcg, Every morning       History of Present Illness:   Patient presents for follow up s/p pelvic sonogram for h/o myomectomy with no new complaints.    Review of Systems:  Review of Systems   Genitourinary:  Negative for menstrual problem, pelvic pain, vaginal bleeding and vaginal discharge.   All other systems reviewed and are negative.      Physical Exam:  /70 (BP Location: Left arm, Patient Position: Sitting, Cuff Size: Standard)   Ht 5' 2\" (1.575 m)   Wt 80.2 kg (176 lb 12.8 oz)   LMP 03/24/2025 (Approximate)   BMI 32.34 kg/m²   Physical Exam  Constitutional:       Appearance: Normal appearance.   Neurological:      Mental Status: She is alert.   Vitals and nursing note reviewed.     Discussion:  Discuss with patient sonogram consist of a 9wk uterus and a small fibroid 0.8cm and the right ovary has a 1.5cm cyst which presumed to be a hemorrhagic cyst.  Patient is please with the report since there is only a small fibroid.     Assessment:   1. Fibroid uterus   2. Right ovarian cyst    Plan:     1. Return to office prn.   2. Patient's depression screening was assessed with a PHQ-2 score of 0.Clinically patient does not have depression. No treatment is required.      Reviewed with patient that [email or web address], but that they will not necessarily be reviewed by me immediately.  Explained that I will review results at my earliest opportunity and contact patient appropriately.  "

## 2025-03-26 DIAGNOSIS — E11.9 TYPE 2 DIABETES MELLITUS WITHOUT COMPLICATION, WITHOUT LONG-TERM CURRENT USE OF INSULIN (HCC): ICD-10-CM

## 2025-03-27 DIAGNOSIS — E11.9 TYPE 2 DIABETES MELLITUS WITHOUT COMPLICATION, WITHOUT LONG-TERM CURRENT USE OF INSULIN (HCC): ICD-10-CM

## 2025-03-28 RX ORDER — SEMAGLUTIDE 0.68 MG/ML
INJECTION, SOLUTION SUBCUTANEOUS
OUTPATIENT
Start: 2025-03-28

## 2025-04-01 NOTE — ASSESSMENT & PLAN NOTE
Last seen by neurology 2/5/25  Continue Topamax daily  Initiate amitriptyline 10 mg nightly for 1 week then increasing to 10 mg weekly until reaching 50 mg  Currently taking  Continue Imitrex 100 mg for restraint of migraine    Plan:  FU in 3 months   Continue current medication regimen  Follow-up with neurology as scheduled 4/21/2025      Orders:    amitriptyline (ELAVIL) 10 mg tablet; start 10mg at bedtime. Increase by 10mg each week until good effect on headaches/pain or reach 50mg daily

## 2025-04-01 NOTE — ASSESSMENT & PLAN NOTE
Lab Results   Component Value Date    HGBA1C 7.2 (A) 04/04/2025   Improving   Last A1c: 8.2  Ozempic mg  .5 mg weekly   Compliant  Eating more korean fermented vegetables   Walking daily       Plan:   FU in 3 months  Increase ozempic 1 mg   FU with ophthalmology referral - given central scheduling Central Scheduling at [phone]         Orders:    POCT hemoglobin A1c    semaglutide, 1 mg/dose, (Ozempic) 4 mg/3 mL injection pen; Inject 0.75 mL (1 mg total) under the skin once a week

## 2025-04-01 NOTE — ASSESSMENT & PLAN NOTE
BP Readings from Last 3 Encounters:   04/04/25 130/70   03/24/25 120/70   03/10/25 130/72       Controlled for age   Current medications: lisinopril 40 mg daily   Compliant     Plan:   Continue current regimen  Patient instructed to decrease consumption of fried/process/ fast foods and increase whole food intake   Goal of 3-5 servings of vegetables per day   Minimize salt to < 2g per day   Portion control   Goal of exercise 150 minutes per week of exercise, or 30 minute of brisk exercise 5x/ week but increase activity slowly (SMART goal)  to get to goal.

## 2025-04-01 NOTE — PROGRESS NOTES
Name: Carlita Cui      : 1985      MRN: [id number]  Encounter Provider: Melany Johansen MD  Encounter Date: 2025   Encounter department: [facility]  :  Assessment & Plan  Migraine without aura and without status migrainosus, not intractable  Last seen by neurology 25  Continue Topamax daily  Initiate amitriptyline 10 mg nightly for 1 week then increasing to 10 mg weekly until reaching 50 mg  Currently taking  Continue Imitrex 100 mg for restraint of migraine    Plan:  FU in 3 months   Continue current medication regimen  Follow-up with neurology as scheduled 2025      Orders:    amitriptyline (ELAVIL) 10 mg tablet; start 10mg at bedtime. Increase by 10mg each week until good effect on headaches/pain or reach 50mg daily    Type 2 diabetes mellitus without complication, without long-term current use of insulin ([facility])    Lab Results   Component Value Date    HGBA1C 7.2 (A) 2025   Improving   Last A1c: 8.2  Ozempic mg  .5 mg weekly   Compliant  Eating more korean fermented vegetables   Walking daily       Plan:   FU in 3 months  Increase ozempic 1 mg   FU with ophthalmology referral - given central scheduling Central Scheduling at [phone]         Orders:    POCT hemoglobin A1c    semaglutide, 1 mg/dose, (Ozempic) 4 mg/3 mL injection pen; Inject 0.75 mL (1 mg total) under the skin once a week        Essential hypertension  BP Readings from Last 3 Encounters:   25 130/70   25 120/70   03/10/25 130/72       Controlled for age   Current medications: lisinopril 40 mg daily   Compliant     Plan:   Continue current regimen  Patient instructed to decrease consumption of fried/process/ fast foods and increase whole food intake   Goal of 3-5 servings of vegetables per day   Minimize salt to < 2g per day   Portion control   Goal of exercise 150 minutes per week of exercise, or 30 minute of brisk exercise 5x/ week but increase activity slowly  "(SMART goal)  to get to goal.                   Seasonal allergic rhinitis due to pollen    Orders:    fluticasone (FLONASE) 50 mcg/act nasal spray; 2 sprays into each nostril daily           History of Present Illness   HPI  Carlita Cui 39 y.o. with past medical history significant for migraines, hypertension, migraine comes to the clinic today for follow-up on diabetes mellitus. She has been eating well , more fremented vegetables, and walking daily. She has been doing well in terms of her ozempic. She has no acute complaints today.     Review of Systems   Constitutional:  Negative for chills and fever.   HENT:  Negative for congestion.    Respiratory:  Negative for cough, chest tightness, shortness of breath and wheezing.    Cardiovascular:  Negative for chest pain and palpitations.   Gastrointestinal:  Negative for abdominal distention, constipation, diarrhea, nausea and vomiting.   Genitourinary:  Negative for dysuria and hematuria.   Neurological:  Positive for headaches. Negative for dizziness, seizures, syncope and weakness.       Objective   /70 (BP Location: Left arm, Patient Position: Sitting, Cuff Size: Standard)   Pulse 99   Temp (!) 96.4 °F (35.8 °C) (Temporal)   Resp 16   Ht 5' 2\" (1.575 m)   Wt 81.6 kg (179 lb 12.8 oz)   LMP 03/24/2025 (Approximate)   SpO2 98%   BMI 32.89 kg/m²      Physical Exam  Vitals and nursing note reviewed.   Constitutional:       General: She is not in acute distress.     Appearance: She is well-developed.   HENT:      Head: Normocephalic and atraumatic.      Right Ear: External ear normal.      Left Ear: External ear normal.      Nose: Congestion present.      Mouth/Throat:      Mouth: Mucous membranes are moist.   Eyes:      Conjunctiva/sclera: Conjunctivae normal.   Cardiovascular:      Rate and Rhythm: Normal rate and regular rhythm.      Heart sounds: No murmur heard.  Pulmonary:      Effort: Pulmonary effort is normal. No respiratory distress.     "  Breath sounds: Normal breath sounds.   Abdominal:      Palpations: Abdomen is soft.      Tenderness: There is no abdominal tenderness.   Musculoskeletal:         General: No swelling.      Cervical back: Neck supple.   Skin:     General: Skin is warm and dry.      Capillary Refill: Capillary refill takes less than 2 seconds.   Neurological:      Mental Status: She is alert.   Psychiatric:         Mood and Affect: Mood normal.

## 2025-04-04 ENCOUNTER — OFFICE VISIT (OUTPATIENT)
Dept: FAMILY MEDICINE CLINIC | Facility: CLINIC | Age: 40
End: 2025-04-04

## 2025-04-04 VITALS
DIASTOLIC BLOOD PRESSURE: 70 MMHG | RESPIRATION RATE: 16 BRPM | SYSTOLIC BLOOD PRESSURE: 130 MMHG | BODY MASS INDEX: 33.09 KG/M2 | HEIGHT: 62 IN | TEMPERATURE: 96.4 F | OXYGEN SATURATION: 98 % | WEIGHT: 179.8 LBS | HEART RATE: 99 BPM

## 2025-04-04 DIAGNOSIS — G43.009 MIGRAINE WITHOUT AURA AND WITHOUT STATUS MIGRAINOSUS, NOT INTRACTABLE: ICD-10-CM

## 2025-04-04 DIAGNOSIS — Z23 ENCOUNTER FOR IMMUNIZATION: ICD-10-CM

## 2025-04-04 DIAGNOSIS — E11.9 TYPE 2 DIABETES MELLITUS WITHOUT COMPLICATION, WITHOUT LONG-TERM CURRENT USE OF INSULIN (HCC): Primary | ICD-10-CM

## 2025-04-04 DIAGNOSIS — J30.1 SEASONAL ALLERGIC RHINITIS DUE TO POLLEN: ICD-10-CM

## 2025-04-04 DIAGNOSIS — I10 ESSENTIAL HYPERTENSION: ICD-10-CM

## 2025-04-04 LAB — SL AMB POCT HEMOGLOBIN AIC: 7.2 (ref ?–6.5)

## 2025-04-04 RX ORDER — FLUTICASONE PROPIONATE 50 MCG
2 SPRAY, SUSPENSION (ML) NASAL DAILY
Qty: 15.8 ML | Refills: 1 | Status: SHIPPED | OUTPATIENT
Start: 2025-04-04

## 2025-04-04 RX ORDER — AMITRIPTYLINE HYDROCHLORIDE 10 MG/1
TABLET ORAL
Qty: 150 TABLET | Refills: 1 | Status: SHIPPED | OUTPATIENT
Start: 2025-04-04

## 2025-04-21 ENCOUNTER — TELEPHONE (OUTPATIENT)
Dept: NEUROLOGY | Facility: CLINIC | Age: 40
End: 2025-04-21

## 2025-04-21 NOTE — TELEPHONE ENCOUNTER
Left message for patient to call back and confirm. Also address was left in voice message as well.
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
## 2025-04-21 NOTE — PATIENT INSTRUCTIONS
Patient Instructions:    Additional Testing  -You are not in need of cerebrovascular imaging at this time.     Headache/migraine treatment:     Prevention  -To take every day to help prevent headaches - not to take at the time of headache:  -Continue topamax 25mg daily  -Continue amitriptyline 10mg nightly x 1 week.  May increase by 10mg weekly thereafter until good headache relief is achieved or until you reach a max dose of 50mg nightly.     Abortive  -For immediate treatment of a headache/migraine  -For your more moderate to severe migraines take this medication as early as possible:  -Continue Imitrex 100 mg at the earliest onset of migraine.  May repeat again in 2 hours if not completely headache free  -It is ok to take ibuprofen, acetaminophen or naproxen (Advil, Tylenol,  Aleve, Excedrin) if they help your headaches you should limit these to No more than 3 times a week to avoid medication overuse/rebound headaches.     Lifestyle Recommendations:    -Remain well-hydrated drinking at least 48 to 64 ounces of noncaffeinated beverages per day in addition to anything caffeinated.    -Getting adequate rest is also very important for migraine prevention (aim for 7-8 hours per night).     -Regular exercise is also beneficial for headache prevention.  I would encourage at the least 5 days of physical exercise weekly for at least 30 minutes.   -I would like for them to keep track of their migraines using an application on their phone or calendar as they see fit. Phone applications: Migraine Silas or Migraine Diary.    Education and Follow-up:    -Please call or send a [employer] message with any questions or concerns. Please present to the emergency room with any concerning symptoms such as: worst headache of your life, sudden painless loss of vision or double vision, difficulty speaking or swallowing, vertigo/room spinning that does not quickly resolve, or weakness/numbness/loss of coordination affecting 1 side of the  face or body.  -Follow up in 4 months or sooner if needed.

## 2025-04-22 ENCOUNTER — OFFICE VISIT (OUTPATIENT)
Dept: NEUROLOGY | Facility: CLINIC | Age: 40
End: 2025-04-22
Payer: COMMERCIAL

## 2025-04-22 VITALS — HEART RATE: 100 BPM | DIASTOLIC BLOOD PRESSURE: 80 MMHG | SYSTOLIC BLOOD PRESSURE: 148 MMHG

## 2025-04-22 DIAGNOSIS — G43.009 MIGRAINE WITHOUT AURA AND WITHOUT STATUS MIGRAINOSUS, NOT INTRACTABLE: Primary | ICD-10-CM

## 2025-04-22 PROCEDURE — 99214 OFFICE O/P EST MOD 30 MIN: CPT

## 2025-04-22 NOTE — ASSESSMENT & PLAN NOTE
She reports that she is doing well since her last office visit.  Still having about 1 headache per week but they are much less intense.  She is on amitriptyline 10mg and topamax 25mg daily for prevention.   She uses the imitrex when needed for more severe headaches and it is effective for her.  She will use tylenol for the less severe headaches. She denies any new associated symptoms with her headaches.  She is still on a waiting list to speak with a therapist.  She has been more active recently going for frequent walks.  She would like to keep her medications the same for now since she has been continuing to make slow improvements.    Workup:  5/28/24 [facility]: No intracranial hemorrhage or calvarial fracture.   Additional testing:  With no new or concerning symptoms, no red flags and an unremarkable neurologic exam, there is no specific indication for further evaluation with MRI brain.  However, this could be obtained at any time if indicated  Preventative:  We discussed headache hygiene and lifestyle factors that may improve headaches  Continue Topamax 25mg daily  Continue Amitriptyline 10mg nightly   Currently on through other providers: Lisinopril  Past/ failed/contraindicated: none  Future options:  CGRP med, botox  Acute:  Discussed not taking over-the-counter or prescription pain medications more than 3 days per week to prevent medication overuse/rebound headache  Continue Imitrex 100mg at the earliest onset of a migraine.  May repeat again in 2 hours if not completely headache free. No more than 2 tabs in 24 hours  Currently on through other providers: Robaxin, naproxen, Zofran  Past/ failed/contraindicated: Tylenol and ibuprofen both ineffective, depakote (ineffective), toradol (effective)  Future options:  Other triptan (Maxalt), ubrelvy, reyvow, nurtec        
right

## 2025-04-22 NOTE — PROGRESS NOTES
Name: Carlita Cui      : 1985      MRN: [id number]  Encounter Provider: PRIETO Coy  Encounter Date: 2025   Encounter department: [facility]  :  Assessment & Plan      {Ambulatory Patient Instructions (Optional):48475}    History of Present Illness {?Quick Links Encounters * My Last Note * Last Note in Specialty * Snapshot * Since Last Visit * History :09374}  Migraine  Pertinent negatives include no back pain, dizziness, eye pain, fever, hearing loss, nausea, neck pain, numbness, photophobia, seizures, tinnitus, vomiting or weakness.      Review of Systems   Constitutional:  Negative for appetite change, fatigue and fever.   HENT: Negative.  Negative for hearing loss, tinnitus, trouble swallowing and voice change.    Eyes: Negative.  Negative for photophobia, pain and visual disturbance.   Respiratory: Negative.  Negative for shortness of breath.    Cardiovascular: Negative.  Negative for palpitations.   Gastrointestinal: Negative.  Negative for nausea and vomiting.   Endocrine: Negative.  Negative for cold intolerance.   Genitourinary: Negative.  Negative for dysuria, frequency and urgency.   Musculoskeletal:  Negative for back pain, gait problem, myalgias, neck pain and neck stiffness.   Skin: Negative.  Negative for rash.   Allergic/Immunologic: Negative.    Neurological:  Negative for dizziness, tremors, seizures, syncope, facial asymmetry, speech difficulty, weakness, light-headedness, numbness and headaches.   Hematological: Negative.  Does not bruise/bleed easily.   Psychiatric/Behavioral: Negative.  Negative for confusion, hallucinations and sleep disturbance.     I have personally reviewed the MA's review of systems and made changes as necessary.    {Select to insert medical history sections (Optional):03970}     Objective {?Quick Links Trend Vitals * Enter New Vitals * Results Review * Timeline (Adult) * Labs * Imaging * Cardiology *  Procedures * Lung Cancer Screening * Surgical eConsent :65594}  LMP 03/24/2025 (Approximate)     Physical Exam  Neurological Exam    {Radiology Results Review (Optional):85874}    {Administrative / Billing Section (Optional):45785}

## 2025-04-28 DIAGNOSIS — J30.1 SEASONAL ALLERGIC RHINITIS DUE TO POLLEN: ICD-10-CM

## 2025-04-28 DIAGNOSIS — G43.009 MIGRAINE WITHOUT AURA AND WITHOUT STATUS MIGRAINOSUS, NOT INTRACTABLE: ICD-10-CM

## 2025-04-28 RX ORDER — FLUTICASONE PROPIONATE 50 MCG
SPRAY, SUSPENSION (ML) NASAL
Qty: 48 ML | Refills: 1 | Status: SHIPPED | OUTPATIENT
Start: 2025-04-28

## 2025-04-29 RX ORDER — AMITRIPTYLINE HYDROCHLORIDE 10 MG/1
TABLET ORAL
Qty: 450 TABLET | Refills: 1 | Status: SHIPPED | OUTPATIENT
Start: 2025-04-29

## 2025-05-12 ENCOUNTER — VBI (OUTPATIENT)
Dept: ADMINISTRATIVE | Facility: OTHER | Age: 40
End: 2025-05-12

## 2025-05-12 NOTE — TELEPHONE ENCOUNTER
05/12/25 10:52 AM     Chart reviewed for Diabetic Eye Exam was/were submitted to the patient's insurance.     Andreina Gonzalez MA   [facility]

## 2025-05-30 DIAGNOSIS — G43.009 MIGRAINE WITHOUT AURA AND WITHOUT STATUS MIGRAINOSUS, NOT INTRACTABLE: ICD-10-CM

## 2025-05-30 RX ORDER — TOPIRAMATE 25 MG/1
25 TABLET, FILM COATED ORAL DAILY
Qty: 30 TABLET | Refills: 3 | Status: SHIPPED | OUTPATIENT
Start: 2025-05-30

## 2025-07-02 NOTE — PROGRESS NOTES
Name: Carlita Cui      : 1985      MRN: [id number]  Encounter Provider: Melany Johansen MD  Encounter Date: 2025   Encounter department: [facility]  :  Assessment & Plan  Migraine without aura and without status migrainosus, not intractable  Headaches controlled   1 once week  at most.     Plan:   Continue Topamax 25mg daily  Continue Amitriptyline 10mg nightly   Continue Imitrex 100mg PRN  FU with neurology on  as scheduled w           Type 2 diabetes mellitus without complication, without long-term current use of insulin ([facility])    Lab Results   Component Value Date    HGBA1C 7.2 (A) 2025   Stable, but uncontrolled for age     Last A1c: 7.2  Ozempic mg  1 mg  Compliant  Diet: cutting out fried foods, more meat and veggies. Cutting out sweets  Walking most days   DFE: low risk       Plan:   FU in 3 months  Continue ozempic 1 mg   FU with ophthalmology referral - given central scheduling Central Scheduling at [phone]         Orders:    POCT hemoglobin A1c                 History of Present Illness   HPI  Carlita Cui 39 y.o. with past medical history significant for migraines, hypertension, migraine comes to the clinic today for follow-up on diabetes mellitus. She has been eating well , more fremented vegetables, and walking most days. She has been doing well in terms of her ozempic and would like to keep the 1 mg dose and not go up if possible. She has no acute complaints today.       Review of Systems   Constitutional:  Negative for chills and fever.   HENT:  Negative for congestion.    Respiratory:  Negative for cough, chest tightness, shortness of breath and wheezing.    Cardiovascular:  Negative for chest pain and palpitations.   Gastrointestinal:  Negative for abdominal distention, constipation, diarrhea, nausea and vomiting.   Genitourinary:  Negative for dysuria and hematuria.   Neurological:  Positive for headaches. Negative  "for dizziness, seizures, syncope and weakness.       Objective   /62 (BP Location: Right arm, Patient Position: Sitting, Cuff Size: Standard)   Pulse 103   Temp (!) 97 °F (36.1 °C) (Temporal)   Resp 16   Ht 5' 2\" (1.575 m)   Wt 76.7 kg (169 lb)   LMP 06/25/2025 (Approximate)   SpO2 99%   BMI 30.91 kg/m²      Physical Exam  Vitals and nursing note reviewed.   Constitutional:       General: She is not in acute distress.     Appearance: She is well-developed.   HENT:      Head: Normocephalic and atraumatic.      Right Ear: External ear normal.      Left Ear: External ear normal.      Nose: Nose normal. No congestion.      Mouth/Throat:      Mouth: Mucous membranes are moist.     Eyes:      Conjunctiva/sclera: Conjunctivae normal.       Cardiovascular:      Rate and Rhythm: Normal rate and regular rhythm.      Pulses: no weak pulses.           Dorsalis pedis pulses are 2+ on the right side and 2+ on the left side.        Posterior tibial pulses are 2+ on the right side and 2+ on the left side.      Heart sounds: No murmur heard.  Pulmonary:      Effort: Pulmonary effort is normal. No respiratory distress.      Breath sounds: Normal breath sounds.   Abdominal:      Palpations: Abdomen is soft.      Tenderness: There is no abdominal tenderness.     Musculoskeletal:         General: No swelling.      Cervical back: Neck supple.   Feet:      Right foot:      Skin integrity: No ulcer, skin breakdown, erythema, warmth, callus or dry skin.      Left foot:      Skin integrity: No ulcer, skin breakdown, erythema, warmth, callus or dry skin.     Skin:     General: Skin is warm and dry.      Capillary Refill: Capillary refill takes less than 2 seconds.     Neurological:      Mental Status: She is alert.     Psychiatric:         Mood and Affect: Mood normal.     Patient's shoes and socks removed.    Right Foot/Ankle   Right Foot Inspection  Skin Exam: skin normal and skin intact. No dry skin, no warmth, no callus, no " erythema, no maceration, no abnormal color, no pre-ulcer, no ulcer and no callus.     Toe Exam: ROM and strength within normal limits. No swelling, no tenderness, erythema and  no right toe deformity    Sensory   Proprioception: intact  Monofilament testing: intact    Vascular  Capillary refills: < 3 seconds  The right DP pulse is 2+. The right PT pulse is 2+.     Left Foot/Ankle  Left Foot Inspection  Skin Exam: skin normal and skin intact. No dry skin, no warmth, no erythema, no maceration, normal color, no pre-ulcer, no ulcer and no callus.     Toe Exam: ROM and strength within normal limits. No swelling, no tenderness and no erythema.     Sensory   Proprioception: intact  Monofilament testing: intact    Vascular  Capillary refills: < 3 seconds  The left DP pulse is 2+. The left PT pulse is 2+.     Assign Risk Category  No deformity present  No loss of protective sensation  No weak pulses  Risk: 0

## 2025-07-03 NOTE — ASSESSMENT & PLAN NOTE
Lab Results   Component Value Date    HGBA1C 7.2 (A) 07/07/2025   Stable, but uncontrolled for age     Last A1c: 7.2  Ozempic mg  1 mg  Compliant  Diet: cutting out fried foods, more meat and veggies. Cutting out sweets  Walking most days   DFE: low risk       Plan:   FU in 3 months  Continue ozempic 1 mg   FU with ophthalmology referral - given central scheduling Central Scheduling at [phone]         Orders:    POCT hemoglobin A1c

## 2025-07-03 NOTE — ASSESSMENT & PLAN NOTE
Headaches controlled   1 once week  at most.     Plan:   Continue Topamax 25mg daily  Continue Amitriptyline 10mg nightly   Continue Imitrex 100mg PRN  FU with neurology on 8/22 as scheduled w

## 2025-07-07 ENCOUNTER — OFFICE VISIT (OUTPATIENT)
Dept: FAMILY MEDICINE CLINIC | Facility: CLINIC | Age: 40
End: 2025-07-07

## 2025-07-07 VITALS
OXYGEN SATURATION: 99 % | BODY MASS INDEX: 31.1 KG/M2 | DIASTOLIC BLOOD PRESSURE: 62 MMHG | RESPIRATION RATE: 16 BRPM | TEMPERATURE: 97 F | HEIGHT: 62 IN | WEIGHT: 169 LBS | SYSTOLIC BLOOD PRESSURE: 124 MMHG | HEART RATE: 103 BPM

## 2025-07-07 DIAGNOSIS — E11.9 TYPE 2 DIABETES MELLITUS WITHOUT COMPLICATION, WITHOUT LONG-TERM CURRENT USE OF INSULIN (HCC): ICD-10-CM

## 2025-07-07 DIAGNOSIS — G43.009 MIGRAINE WITHOUT AURA AND WITHOUT STATUS MIGRAINOSUS, NOT INTRACTABLE: Primary | ICD-10-CM

## 2025-07-07 LAB — SL AMB POCT HEMOGLOBIN AIC: 7.2 (ref ?–6.5)

## 2025-07-07 PROCEDURE — 83036 HEMOGLOBIN GLYCOSYLATED A1C: CPT | Performed by: FAMILY MEDICINE

## 2025-07-07 PROCEDURE — 99213 OFFICE O/P EST LOW 20 MIN: CPT | Performed by: FAMILY MEDICINE

## 2025-07-21 ENCOUNTER — HOSPITAL ENCOUNTER (OUTPATIENT)
Dept: MAMMOGRAPHY | Facility: CLINIC | Age: 40
Discharge: HOME | End: 2025-07-21
Payer: COMMERCIAL

## 2025-07-21 ENCOUNTER — CLINICAL SUPPORT (OUTPATIENT)
Dept: OBGYN CLINIC | Facility: CLINIC | Age: 40
End: 2025-07-21

## 2025-07-21 VITALS — WEIGHT: 169 LBS | BODY MASS INDEX: 31.1 KG/M2 | HEIGHT: 62 IN

## 2025-07-21 VITALS — WEIGHT: 173.2 LBS | SYSTOLIC BLOOD PRESSURE: 130 MMHG | DIASTOLIC BLOOD PRESSURE: 80 MMHG | BODY MASS INDEX: 31.68 KG/M2

## 2025-07-21 DIAGNOSIS — Z12.31 ENCOUNTER FOR SCREENING MAMMOGRAM FOR MALIGNANT NEOPLASM OF BREAST: ICD-10-CM

## 2025-07-21 DIAGNOSIS — Z23 NEED FOR HPV VACCINE: Primary | ICD-10-CM

## 2025-07-21 PROCEDURE — 90651 9VHPV VACCINE 2/3 DOSE IM: CPT

## 2025-07-21 PROCEDURE — 77067 SCR MAMMO BI INCL CAD: CPT

## 2025-07-21 PROCEDURE — 90471 IMMUNIZATION ADMIN: CPT

## 2025-07-21 PROCEDURE — 77063 BREAST TOMOSYNTHESIS BI: CPT

## 2025-07-23 ENCOUNTER — TELEPHONE (OUTPATIENT)
Dept: OBGYN CLINIC | Facility: CLINIC | Age: 40
End: 2025-07-23

## 2025-08-08 ENCOUNTER — VBI (OUTPATIENT)
Dept: ADMINISTRATIVE | Facility: OTHER | Age: 40
End: 2025-08-08

## 2025-08-15 ENCOUNTER — APPOINTMENT (OUTPATIENT)
Dept: URGENT CARE | Age: 40
End: 2025-08-15

## (undated) DEVICE — GLOVE INDICATOR PI UNDERGLOVE SZ 7 BLUE

## (undated) DEVICE — UNDYED BRAIDED (POLYGLACTIN 910), SYNTHETIC ABSORBABLE SUTURE: Brand: COATED VICRYL

## (undated) DEVICE — SYRINGE 50ML LL

## (undated) DEVICE — [HIGH FLOW INSUFFLATOR,  DO NOT USE IF PACKAGE IS DAMAGED,  KEEP DRY,  KEEP AWAY FROM SUNLIGHT,  PROTECT FROM HEAT AND RADIOACTIVE SOURCES.]: Brand: PNEUMOSURE

## (undated) DEVICE — SCD SEQUENTIAL COMPRESSION COMFORT SLEEVE MEDIUM KNEE LENGTH: Brand: KENDALL SCD

## (undated) DEVICE — INTENDED FOR TISSUE SEPARATION, AND OTHER PROCEDURES THAT REQUIRE A SHARP SURGICAL BLADE TO PUNCTURE OR CUT.: Brand: BARD-PARKER SAFETY BLADES SIZE 11, STERILE

## (undated) DEVICE — FLOSEAL HEMOSTATIC MATRIX, 5 ML: Brand: FLOSEAL

## (undated) DEVICE — BLUE HEAT SCOPE WARMER

## (undated) DEVICE — ENDOPATH 5MM CURVED SCISSORS WITH MONOPOLAR CAUTERY: Brand: ENDOPATH

## (undated) DEVICE — TRAY FOLEY 16FR URIMETER SILICONE SURESTEP

## (undated) DEVICE — SUT MONOCRYL 4-0 PS-2 27 IN Y426H

## (undated) DEVICE — OCCLUDER COLPO-PNEUMO

## (undated) DEVICE — TROCAR: Brand: KII FIOS FIRST ENTRY

## (undated) DEVICE — ADHESIVE SKN CLSR HISTOACRYL FLEX 0.5ML LF

## (undated) DEVICE — PENCIL ELECTROSURG E-Z CLEAN -0035H

## (undated) DEVICE — DRESSING EXUDERM SATIN HYDROCOLLOID 4 X 4 IN

## (undated) DEVICE — IV EXTENSION TUBING 33 IN

## (undated) DEVICE — 5 MM CURVED DISSECTORS WITH MONOPOLAR CAUTERY: Brand: ENDOPATH

## (undated) DEVICE — GLOVE INDICATOR PI UNDERGLOVE SZ 6.5 BLUE

## (undated) DEVICE — LAPAROSCOPIC SMOKE EVAC TUBING

## (undated) DEVICE — SUT STRATAFIX SPIRAL 2-0 CT-1 30 CM SXPP1B410

## (undated) DEVICE — GLOVE PI ULTRA TOUCH SZ.6.5

## (undated) DEVICE — MAYO STAND COVER: Brand: CONVERTORS

## (undated) DEVICE — NEEDLE HYPO 22G X 1-1/2 IN

## (undated) DEVICE — SYRINGE 20ML LL

## (undated) DEVICE — DRAPE EQUIPMENT RF WAND

## (undated) DEVICE — HARMONIC ACE 5MM DIAMETER SHEARS 36CM SHAFT LENGTH + ADAPTIVE TISSUE TECHNOLOGY FOR USE WITH GENERATOR G11: Brand: HARMONIC ACE

## (undated) DEVICE — DRAPE SURGIKIT SADDLE BAG LAP

## (undated) DEVICE — GLOVE PI ULTRA TOUCH SZ 6

## (undated) DEVICE — BETHLEHEM UNIVERSAL GYN LAP PK: Brand: CARDINAL HEALTH

## (undated) DEVICE — CHLORAPREP HI-LITE 26ML ORANGE

## (undated) DEVICE — TROCAR: Brand: KII SLEEVE

## (undated) DEVICE — TROCARS: Brand: KII® OPTICAL ACCESS SYSTEM

## (undated) DEVICE — 3M™ TEGADERM™ TRANSPARENT FILM DRESSING FRAME STYLE, 1626W, 4 IN X 4-3/4 IN (10 CM X 12 CM), 50/CT 4CT/CASE: Brand: 3M™ TEGADERM™

## (undated) DEVICE — TISSUE RETRIEVAL SYSTEM: Brand: INZII RETRIEVAL SYSTEM